# Patient Record
Sex: FEMALE | Race: WHITE | NOT HISPANIC OR LATINO | Employment: OTHER | URBAN - METROPOLITAN AREA
[De-identification: names, ages, dates, MRNs, and addresses within clinical notes are randomized per-mention and may not be internally consistent; named-entity substitution may affect disease eponyms.]

---

## 2017-02-01 ENCOUNTER — APPOINTMENT (OUTPATIENT)
Dept: PLASTIC SURGERY | Facility: IMAGING CENTER | Age: 77
End: 2017-02-01

## 2017-02-01 DIAGNOSIS — M81.0 OSTEOPOROSIS: ICD-10-CM

## 2017-02-01 RX ORDER — ZOLEDRONIC ACID 5 MG/100ML
5 INJECTION, SOLUTION INTRAVENOUS ONCE
Qty: 100 ML | Refills: 0 | Status: SHIPPED
Start: 2017-02-01 | End: 2017-02-01

## 2017-02-06 RX ORDER — LOVASTATIN 10 MG/1
TABLET ORAL
Qty: 90 TAB | Refills: 3 | Status: SHIPPED | OUTPATIENT
Start: 2017-02-06 | End: 2018-01-22 | Stop reason: SDUPTHER

## 2017-02-24 ENCOUNTER — HOSPITAL ENCOUNTER (OUTPATIENT)
Dept: RADIOLOGY | Facility: MEDICAL CENTER | Age: 77
End: 2017-02-24
Attending: FAMILY MEDICINE
Payer: MEDICARE

## 2017-02-24 ENCOUNTER — OFFICE VISIT (OUTPATIENT)
Dept: INTERNAL MEDICINE | Facility: IMAGING CENTER | Age: 77
End: 2017-02-24
Payer: MEDICARE

## 2017-02-24 VITALS
WEIGHT: 182 LBS | SYSTOLIC BLOOD PRESSURE: 130 MMHG | OXYGEN SATURATION: 95 % | RESPIRATION RATE: 14 BRPM | BODY MASS INDEX: 31.07 KG/M2 | TEMPERATURE: 98.4 F | HEART RATE: 58 BPM | HEIGHT: 64 IN | DIASTOLIC BLOOD PRESSURE: 60 MMHG

## 2017-02-24 DIAGNOSIS — R10.31 RIGHT GROIN PAIN: Primary | ICD-10-CM

## 2017-02-24 DIAGNOSIS — M25.551 RIGHT HIP PAIN: ICD-10-CM

## 2017-02-24 DIAGNOSIS — R10.31 RIGHT GROIN PAIN: ICD-10-CM

## 2017-02-24 PROCEDURE — 1101F PT FALLS ASSESS-DOCD LE1/YR: CPT | Performed by: FAMILY MEDICINE

## 2017-02-24 PROCEDURE — 1036F TOBACCO NON-USER: CPT | Performed by: FAMILY MEDICINE

## 2017-02-24 PROCEDURE — G8482 FLU IMMUNIZE ORDER/ADMIN: HCPCS | Performed by: FAMILY MEDICINE

## 2017-02-24 PROCEDURE — G8419 CALC BMI OUT NRM PARAM NOF/U: HCPCS | Performed by: FAMILY MEDICINE

## 2017-02-24 PROCEDURE — 4040F PNEUMOC VAC/ADMIN/RCVD: CPT | Performed by: FAMILY MEDICINE

## 2017-02-24 PROCEDURE — 73521 X-RAY EXAM HIPS BI 2 VIEWS: CPT

## 2017-02-24 PROCEDURE — G8432 DEP SCR NOT DOC, RNG: HCPCS | Performed by: FAMILY MEDICINE

## 2017-02-24 PROCEDURE — 99214 OFFICE O/P EST MOD 30 MIN: CPT | Performed by: FAMILY MEDICINE

## 2017-02-24 NOTE — PROGRESS NOTES
SUBJECTIVE:    Chief Complaint   Patient presents with   • Hip Pain     right   • Groin Pain     right   • Groin Swelling     right       Kacie Tiffanie Rubalcava is a 76 y.o. female,   Established Patient     PROBLEM #1-HISTORY OF PRESENT ILLNESS  New Problem  PATIENT STATEMENT OF PROBLEM - R LE issues  ONSET - 4 weeks  COURSE - never had before. She did have R hip issues that were treated, which resolved, and this current symptoms are different. She now has R anterior groin pain that radiates around to her R hip.  She is R leg and R hand dominant.  She does have some R LE swelling compared to left.  Her back and L knee are fine now.   INTENSITY/STATUS/LOCATION/RADIATION - mild to moderate/ present/ as above  AGGRAVATORS - certain movements  RELIEVERS - rest  TREATMENTS/COMPLIANCE/@GOAL? - PT for R LE was ineffective; ibuprofen ineffective/ good/ no     Allergies   Allergen Reactions   • Food Anaphylaxis     mussels   • Sulfa Drugs Anaphylaxis   • Ace Inhibitors Cough   • Nifedipine Swelling     Leg edema       Patient Active Problem List    Diagnosis Date Noted   • Essential hypertension 12/01/2015   • Obesity (BMI 30.0-34.9) 04/09/2015   • Mixed hyperlipidemia with apolipoprotein E2 variant 04/09/2015   • Insulin resistance 04/09/2015   • Metabolic syndrome 04/09/2015   • Inflammation of arteries (CMS-HCC) 04/09/2015   • Elevated homocysteine (CMS-HCC) 04/09/2015   • Sinus bradycardia 03/16/2015   • Postmenopausal bleeding 03/13/2015   • GI problem 02/27/2015   • PMB (postmenopausal bleeding) 02/27/2015   • Abnormal pelvic ultrasound 02/27/2015   • Eczema 10/14/2014   • Abnormal biliary HIDA scan 10/14/2014   • Osteoporosis, post-menopausal 10/14/2014   • Need for influenza vaccination 10/07/2014   • Environmental allergies 04/18/2014   • Eustachian tube dysfunction 04/18/2014   • Breast cancer screening 11/13/2013   • Dry skin dermatitis 11/13/2013   • GI symptoms 11/13/2013   • Vitamin D deficiency disease  "11/13/2013   • Dupuytren's contracture of both hands 05/22/2013   • Rash 05/22/2013   • Degenerative joint disease    • Osteoporosis    • Hyperlipemia        Outpatient Encounter Prescriptions as of 2/24/2017   Medication Sig Dispense Refill   • lovastatin (MEVACOR) 10 MG tablet TAKE 1 TAB BY MOUTH EVERY DAY. 90 Tab 3   • atenolol (TENORMIN) 25 MG Tab TAKE 1 TAB BY MOUTH EVERY DAY. 90 Tab 3   • losartan (COZAAR) 50 MG Tab Take 1 Tab by mouth every day. 90 Tab 3   • aspirin EC (ECOTRIN) 81 MG TBEC Take 1 Tab by mouth every day.     • Cholecalciferol (VITAMIN D3) 5000 UNITS CAPS Take 1 Cap by mouth every day.     • Glucosamine-Chondroit-Vit C-Mn (GLUCOSAMINE CHONDR 500 COMPLEX PO) Take 1,000 mg by mouth.     • Melatonin 5 MG TABS Take 1-2 Tabs by mouth at bedtime as needed.     • Coral Calcium 1000 (390 CA) MG TABS Take 1 Each by mouth every day. 60 Each    • Ascorbic Acid (VITAMIN C) 1000 MG TABS Take 1 Tab by mouth. 60 Each      No facility-administered encounter medications on file as of 2/24/2017.       Social History   Substance Use Topics   • Smoking status: Never Smoker    • Smokeless tobacco: Never Used   • Alcohol Use: 3.6 oz/week     6 Standard drinks or equivalent per week      Comment: 3-4 glasses per week       Family History   Problem Relation Age of Onset   • Lung Disease Mother    • Cancer Mother      lung cancer   • Cancer Paternal Aunt      breast       Patient's Past, Social, and Family History reviewed and updated by me in EPIC today.    REVIEW OF SYMPTOMS:               Pertinent Positives as above.    All other systems reviewed and negative.     OBJECTIVE:    /60 mmHg  Pulse 58  Temp(Src) 36.9 °C (98.4 °F)  Resp 14  Ht 1.626 m (5' 4.02\")  Wt 82.555 kg (182 lb)  BMI 31.22 kg/m2  SpO2 95%  LMP 01/01/2002  Body mass index is 31.22 kg/(m^2).    Well developed, well nourished female, no acute distress, non-ill appearing. Comfortable, appears stated age, pleasant and cooperative  HEAD: " atraumatic, normocephalic   EYES: Conjunctiva normal, EOMI, PERRLA, acuity grossly intact.   EARS/NOSE/THROAT: TM's normal, no SSX of infection, no perforation, no hemotympanum, acuity grossly intact. Oropharynx: benign, no lesions noted. Nares: benign.   NECK: supple, no adenopathy, no thyromegaly or nodules, no JVD, no carotid bruits.   CHEST/LUNGS: clear to auscultation and percussion bilaterally. No adventitious breath sounds.   CARDIOVASCULAR: regular rate and rhythm, no murmur. PMI not displaced. Good central and peripheral pulses.   BACK: no CVA tenderness.   ABDOMEN: soft, non-tender, non-distended, no masses, no hepatosplenomegaly. Normal active bowel tones.   : deferred.   Rectal: deferred.   Extremities: warm/well-perfused, no cyanosis, clubbing. Mild+ bilateral lower leg not pitting edema: chronic.  R hip/groin pain to manipulation/ROM.    SKIN: clear, unbroken, no rashes, normal turgor.   Neuro: Mental Status: Alert and Oriented x 3. CN II-XII grossly intact. Gait normal. Non-focal, intact. Normal strength, sensation    ASSESSMENT:    1. Right groin pain  DX-HIP-BILATERAL-WITH PELVIS-2 VIEWS   2. Right hip pain  DX-HIP-BILATERAL-WITH PELVIS-2 VIEWS       PLAN:    Total Face-to-Face time spent with patient: 30 minutes  Amount of time spent counseling patient and/or coordinating care: 20 minutes    The nature of patient counseling as below:  -Patient Education, including below topics:  -Differential Diagnoses and treatment options discussed  -Risks, benefits, alternatives discussed  -Therapeutic Lifestyle Changes discussed    The nature of coordination of care as below:  -Continue (other) present chronic medications  -PHYSICAL THERAPY evaluation and treatment (Order given)  -Other: Dx Hips  -Seek medical attention immediately if worse    FOLLOW-UP:  -in 1 month  -and sooner if test/consult results warrant  And for Health Care Maintenance Exams  And as needed.

## 2017-02-24 NOTE — MR AVS SNAPSHOT
"        Kacie Rubalcava   2017 2:00 PM   Office Visit   MRN: [id number]    Department:  [facility]   Dept Phone:  [phone]    Description:  Female : 1940   Provider:  Nino Lundberg M.D.           Reason for Visit     Hip Pain right    Groin Pain right    Groin Swelling right      Allergies as of 2017     Allergen Noted Reactions    Food 10/09/2011   Anaphylaxis    mussels    Sulfa Drugs 2008   Anaphylaxis    Ace Inhibitors 2015   Cough    Nifedipine 2015   Swelling    Leg edema      You were diagnosed with     Right groin pain   [918974]  -  Primary     Right hip pain   [[id number]]         Vital Signs     Blood Pressure Pulse Temperature Respirations Height Weight    130/60 mmHg 58 36.9 °C (98.4 °F) 14 1.626 m (5' 4.02\") 82.555 kg (182 lb)    Body Mass Index Oxygen Saturation Last Menstrual Period Smoking Status          31.22 kg/m2 95% 2002 Never Smoker         Basic Information     Date Of Birth Sex Race Ethnicity Preferred Language    1940 Female White Non- English      Your appointments     Mar 12, 2017  1:30 PM   EST MISC Infusion 2 HR with RN 2   Infusion Services ([facility])    [address] [place] [address] [address]  [place] [address] [phone]              Problem List              ICD-10-CM Priority Class Noted - Resolved    Degenerative joint disease M19.90   Unknown - Present    Osteoporosis M81.0   Unknown - Present    Hyperlipemia E78.5   Unknown - Present    Dupuytren's contracture of both hands M72.0   2013 - Present    Rash R21   2013 - Present    Breast cancer screening Z12.39   2013 - Present    Dry skin dermatitis L85.3   2013 - Present    GI symptoms R19.8   2013 - Present    Vitamin D deficiency disease E55.9   2013 - Present    Environmental allergies Z91.09   2014 - Present    Eustachian tube dysfunction H69.80   2014 - Present    Need for influenza vaccination Z23   10/7/2014 - Present   " Eczema L30.9   10/14/2014 - Present    Abnormal biliary HIDA scan    10/14/2014 - Present    Osteoporosis, post-menopausal M81.0   10/14/2014 - Present    GI problem R19.8   2/27/2015 - Present    PMB (postmenopausal bleeding) N95.0   2/27/2015 - Present    Abnormal pelvic ultrasound    2/27/2015 - Present    Postmenopausal bleeding N95.0   3/13/2015 - Present    Sinus bradycardia R00.1   3/16/2015 - Present    Obesity (BMI 30.0-34.9) E66.9   4/9/2015 - Present    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   4/9/2015 - Present    Insulin resistance E88.81   4/9/2015 - Present    Metabolic syndrome E88.81   4/9/2015 - Present    Inflammation of arteries (CMS-HCC) M30.0   4/9/2015 - Present    Elevated homocysteine (CMS-HCC) E72.11   4/9/2015 - Present    Essential hypertension I10   12/1/2015 - Present      Health Maintenance        Date Due Completion Dates    MAMMOGRAM 3/2/2018 3/2/2016, 1/29/2015, 12/17/2013, 12/12/2013, 10/19/2012, 9/16/2011, 2/26/2010, 2/18/2010, 2/18/2010, 10/7/2008, 9/30/2008, 9/30/2008    BONE DENSITY 10/22/2019 10/22/2014, 10/19/2012, 2/18/2010, 9/30/2008    IMM DTaP/Tdap/Td Vaccine (2 - Td) 5/22/2023 5/22/2013            Current Immunizations     13-VALENT PCV PREVNAR 12/9/2014    Influenza TIV (IM) 10/31/2012 12:05 PM    Influenza Vaccine Adult HD 10/13/2016, 10/16/2015, 10/7/2014  1:04 PM    Pneumococcal polysaccharide vaccine (PPSV-23) 11/13/2010    SHINGLES VACCINE 11/13/2007    Tdap Vaccine 5/22/2013      Below and/or attached are the medications your provider expects you to take. Review all of your home medications and newly ordered medications with your provider and/or pharmacist. Follow medication instructions as directed by your provider and/or pharmacist. Please keep your medication list with you and share with your provider. Update the information when medications are discontinued, doses are changed, or new medications (including over-the-counter products) are  added; and carry medication information at all times in the event of emergency situations     Allergies:  FOOD - Anaphylaxis     SULFA DRUGS - Anaphylaxis     ACE INHIBITORS - Cough     NIFEDIPINE - Swelling               Medications  Valid as of: February 24, 2017 -  4:52 PM    Generic Name Brand Name Tablet Size Instructions for use    Ascorbic Acid (Tab) Vitamin C 1000 MG Take 1 Tab by mouth.        Aspirin (Tablet Delayed Response) ECOTRIN 81 MG Take 1 Tab by mouth every day.        Atenolol (Tab) TENORMIN 25 MG TAKE 1 TAB BY MOUTH EVERY DAY.        Cholecalciferol (Cap) vitamin D3 5000 UNITS Take 1 Cap by mouth every day.        Coral Calcium (Tab) Coral Calcium 1000 (390 CA) MG Take 1 Each by mouth every day.        Glucosamine-Chondroit-Vit C-Mn   Take 1,000 mg by mouth.        Losartan Potassium (Tab) COZAAR 50 MG Take 1 Tab by mouth every day.        Lovastatin (Tab) MEVACOR 10 MG TAKE 1 TAB BY MOUTH EVERY DAY.        Melatonin (Tab) Melatonin 5 MG Take 1-2 Tabs by mouth at bedtime as needed.        .                 Medicines prescribed today were sent to:     [facility]ACY #[id number] - [place] - [address] [place] [address]    Phone: [phone] Fax: [phone]    Open 24 Hours?: No      Medication refill instructions:       If your prescription bottle indicates you have medication refills left, it is not necessary to call your provider’s office. Please contact your pharmacy and they will refill your medication.    If your prescription bottle indicates you do not have any refills left, you may request refills at any time through one of the following ways: The online [employer] system (except Urgent Care), by calling your provider’s office, or by asking your pharmacy to contact your provider’s office with a refill request. Medication refills are processed only during regular business hours and may not be available until the next business day. Your provider may request additional information or to  have a follow-up visit with you prior to refilling your medication.   *Please Note: Medication refills are assigned a new Rx number when refilled electronically. Your pharmacy may indicate that no refills were authorized even though a new prescription for the same medication is available at the pharmacy. Please request the medicine by name with the pharmacy before contacting your provider for a refill.        Your To Do List     Future Labs/Procedures Complete By Expires    DX-HIP-BILATERAL-WITH PELVIS-2 VIEWS  As directed 2/24/2018      Other Notes About Your Plan     Colonoscopy: 9/7/10 repeat in 10 yrs  DEXA 10/14 Mammo 1/29/14 A1c 11/31/12  6.2  Neurosurg-Song- Phys Med & Rehab-[facility]    Communication Authorzation Form 1/27/15 scanned in chart           MyChart Access Code: Activation code not generated  Current MyChart Status: Active

## 2017-02-26 NOTE — PATIENT INSTRUCTIONS
Current Outpatient Prescriptions Ordered in [facility]   Medication Sig Dispense Refill   • lovastatin (MEVACOR) 10 MG tablet TAKE 1 TAB BY MOUTH EVERY DAY. 90 Tab 3   • atenolol (TENORMIN) 25 MG Tab TAKE 1 TAB BY MOUTH EVERY DAY. 90 Tab 3   • losartan (COZAAR) 50 MG Tab Take 1 Tab by mouth every day. 90 Tab 3   • aspirin EC (ECOTRIN) 81 MG TBEC Take 1 Tab by mouth every day.     • Cholecalciferol (VITAMIN D3) 5000 UNITS CAPS Take 1 Cap by mouth every day.     • Glucosamine-Chondroit-Vit C-Mn (GLUCOSAMINE CHONDR 500 COMPLEX PO) Take 1,000 mg by mouth.     • Melatonin 5 MG TABS Take 1-2 Tabs by mouth at bedtime as needed.     • Coral Calcium 1000 (390 CA) MG TABS Take 1 Each by mouth every day. 60 Each    • Ascorbic Acid (VITAMIN C) 1000 MG TABS Take 1 Tab by mouth. 60 Each      No current Epic-ordered facility-administered medications on file.

## 2017-04-05 RX ORDER — LOSARTAN POTASSIUM 50 MG/1
TABLET ORAL
Qty: 90 TAB | Refills: 3 | Status: SHIPPED | OUTPATIENT
Start: 2017-04-05 | End: 2018-01-05

## 2017-05-31 ENCOUNTER — OFFICE VISIT (OUTPATIENT)
Dept: OTHER | Facility: IMAGING CENTER | Age: 77
End: 2017-05-31

## 2017-05-31 DIAGNOSIS — M54.2 NECK PAIN ON LEFT SIDE: ICD-10-CM

## 2017-05-31 PROCEDURE — 99213 OFFICE O/P EST LOW 20 MIN: CPT | Mod: 25 | Performed by: FAMILY MEDICINE

## 2017-05-31 PROCEDURE — 97811 ACUP 1/> W/O ESTIM EA ADD 15: CPT | Performed by: FAMILY MEDICINE

## 2017-05-31 PROCEDURE — 97813 ACUP 1/> W/ESTIM 1ST 15 MIN: CPT | Performed by: FAMILY MEDICINE

## 2017-05-31 NOTE — MR AVS SNAPSHOT
Kacie Rubalcava   2017 10:00 AM   Office Visit   MRN: [id number]    Department:  Acupuncture Med   Dept Phone:  [phone]    Description:  Female : 1940   Provider:  Freeman Zhang D.O.           Allergies as of 2017     Allergen Noted Reactions    Food 10/09/2011   Anaphylaxis    mussels    Sulfa Drugs 2008   Anaphylaxis    Ace Inhibitors 2015   Cough    Nifedipine 2015   Swelling    Leg edema      You were diagnosed with     Neck pain on left side   [[id number]]         Vital Signs     Last Menstrual Period Smoking Status                2002 Never Smoker           Basic Information     Date Of Birth Sex Race Ethnicity Preferred Language    1940 Female White Non- English      Your appointments     2017  1:30 PM   EST MISC Infusion 2 HR with RN 5   Infusion Services ([facility])    [address] [facility] [address]  [place] [id number] [phone]              Problem List              ICD-10-CM Priority Class Noted - Resolved    Degenerative joint disease M19.90   Unknown - Present    Osteoporosis M81.0   Unknown - Present    Hyperlipemia E78.5   Unknown - Present    Dupuytren's contracture of both hands M72.0   2013 - Present    Rash R21   2013 - Present    Breast cancer screening Z12.39   2013 - Present    Dry skin dermatitis L85.3   2013 - Present    GI symptoms R19.8   2013 - Present    Vitamin D deficiency disease E55.9   2013 - Present    Environmental allergies Z91.09   2014 - Present    Eustachian tube dysfunction H69.80   2014 - Present    Need for influenza vaccination Z23   10/7/2014 - Present    Eczema L30.9   10/14/2014 - Present    Abnormal biliary HIDA scan    10/14/2014 - Present    Osteoporosis, post-menopausal M81.0   10/14/2014 - Present    GI problem R19.8   2015 - Present    PMB (postmenopausal bleeding) N95.0   2015 - Present    Abnormal pelvic ultrasound    2015 - Present    Postmenopausal bleeding N95.0   3/13/2015 - Present    Sinus bradycardia R00.1   3/16/2015 - Present    Obesity (BMI 30.0-34.9) E66.9   4/9/2015 - Present    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   4/9/2015 - Present    Insulin resistance E88.81   4/9/2015 - Present    Metabolic syndrome E88.81   4/9/2015 - Present    Inflammation of arteries (CMS-HCC) M30.0   4/9/2015 - Present    Elevated homocysteine (CMS-HCC) E72.11   4/9/2015 - Present    Essential hypertension I10   12/1/2015 - Present    Neck pain on left side M54.2   5/31/2017 - Present      Health Maintenance        Date Due Completion Dates    MAMMOGRAM 3/2/2018 3/2/2016, 1/29/2015, 12/17/2013, 12/12/2013, 10/19/2012, 9/16/2011, 2/26/2010, 2/18/2010, 2/18/2010, 10/7/2008, 9/30/2008, 9/30/2008    BONE DENSITY 10/22/2019 10/22/2014, 10/19/2012, 2/18/2010, 9/30/2008    IMM DTaP/Tdap/Td Vaccine (2 - Td) 5/22/2023 5/22/2013            Current Immunizations     13-VALENT PCV PREVNAR 12/9/2014    Influenza TIV (IM) 10/31/2012 12:05 PM    Influenza Vaccine Adult HD 10/13/2016, 10/16/2015, 10/7/2014  1:04 PM    Pneumococcal polysaccharide vaccine (PPSV-23) 11/13/2010    SHINGLES VACCINE 11/13/2007    Tdap Vaccine 5/22/2013      Below and/or attached are the medications your provider expects you to take. Review all of your home medications and newly ordered medications with your provider and/or pharmacist. Follow medication instructions as directed by your provider and/or pharmacist. Please keep your medication list with you and share with your provider. Update the information when medications are discontinued, doses are changed, or new medications (including over-the-counter products) are added; and carry medication information at all times in the event of emergency situations     Allergies:  FOOD - Anaphylaxis     SULFA DRUGS - Anaphylaxis     ACE INHIBITORS - Cough     NIFEDIPINE - Swelling               Medications  Valid as of: May 31, 2017  - 11:11 AM    Generic Name Brand Name Tablet Size Instructions for use    Ascorbic Acid (Tab) Vitamin C 1000 MG Take 1 Tab by mouth.        Aspirin (Tablet Delayed Response) ECOTRIN 81 MG Take 1 Tab by mouth every day.        Atenolol (Tab) TENORMIN 25 MG TAKE 1 TAB BY MOUTH EVERY DAY.        Cholecalciferol (Cap) vitamin D3 5000 UNITS Take 1 Cap by mouth every day.        Coral Calcium (Tab) Coral Calcium 1000 (390 CA) MG Take 1 Each by mouth every day.        Glucosamine-Chondroit-Vit C-Mn   Take 1,000 mg by mouth.        Losartan Potassium (Tab) COZAAR 50 MG TAKE 1 TAB BY MOUTH EVERY DAY.        Lovastatin (Tab) MEVACOR 10 MG TAKE 1 TAB BY MOUTH EVERY DAY.        Melatonin (Tab) Melatonin 5 MG Take 1-2 Tabs by mouth at bedtime as needed.        .                 Medicines prescribed today were sent to:     [facility]/PHARMACY #[id number] - [place] - [address] [place] [address]    Phone: [phone] Fax: [phone]    Open 24 Hours?: No      Medication refill instructions:       If your prescription bottle indicates you have medication refills left, it is not necessary to call your provider’s office. Please contact your pharmacy and they will refill your medication.    If your prescription bottle indicates you do not have any refills left, you may request refills at any time through one of the following ways: The online [employer] system (except Urgent Care), by calling your provider’s office, or by asking your pharmacy to contact your provider’s office with a refill request. Medication refills are processed only during regular business hours and may not be available until the next business day. Your provider may request additional information or to have a follow-up visit with you prior to refilling your medication.   *Please Note: Medication refills are assigned a new Rx number when refilled electronically. Your pharmacy may indicate that no refills were authorized even though a new prescription for the same  medication is available at the pharmacy. Please request the medicine by name with the pharmacy before contacting your provider for a refill.        Instructions    Have encouraged the patient to rest after the acupuncture session today - taking naps or going to sleep early as necessary.  Increase intake of water and refrain from strenuous activities.  Patient may expect to feel transient worsening of symptoms, but this should resolve to benefit in the next day or two after treatment.    The side effects of Acupuncture needle insertion include: minor bruising, bleeding, or pain at the site of needle insertion.  If more worrisome symptoms, such as continued bleeding, severe bruising, or continue pain or altered sensation persist, please contact [facility] office @ [phone]        Other Notes About Your Plan     Colonoscopy: 9/7/10 repeat in 10 yrs  DEXA 10/14 Mammo 1/29/14 A1c 11/31/12  6.2  Neurosurg-Song- Phys Med [facility] Rehab-[facility]    Communication Authorzation Form 1/27/15 scanned in chart           [email or web address] Access Code: Activation code not generated  Current [email or web address] Status: Active

## 2017-05-31 NOTE — PROGRESS NOTES
[facility] Acupuncture Progress Note  [address] [place] [address]  Dept: [phone]      Patient Name: Kacie Rubalcava   MRN: [id number]  YOB: 1940  PCP: Ally Barroso M.D.  Date of Service: 5/31/2017 10:05 AM    CC Back of shoulder pain   HPI Patient is a 75 yo  female with 1 year hx of left sided back of shoulder tenderness that occasionally radiates to outside of outer arm.  She has been Dr. Whyte's group patient for a long time and it helps but this episode is bothering her and she can't get an appointment with Dr. Whyte soon enough.    She denies numbness and weakness of arm and hand and there isn't any trauma preceding the event.  She also has her chronic problems of every joints in her body hurts.     ROS Birthplace: Not asked  Color:  Season:  -handed  Scars:   PMH Past Medical History   Diagnosis Date   • Hypertension    • HTN (hypertension), benign    • Hyperlipemia    • Osteoporosis    • Degenerative joint disease    • Anxiety    • Snoring    • Arthritis 03/12/15     generalized   • Other specified symptom associated with female genital organs      post menopausal bleeding   • High cholesterol    • Neck pain on left side 5/31/2017     Past Surgical History   Procedure Laterality Date   • Tonsillectomy     • Us-needle core bx-breast panel     • Breast biopsy  11/13/08     Performed by ISRAEL HICKS at SURGERY SAME DAY [facility] ORS X3   • Appendectomy     • Colon resection  2000     polyps   • Hysteroscopy with video diagnostic  3/13/2015     Performed by Alexandria Keenan M.D. at SURGERY SAME DAY [facility] ORS   • Dilation and curettage  3/13/2015     Performed by Alexandria Keenan M.D. at SURGERY SAME DAY [facility] ORS       Social History     Social History   • Marital Status: Single     Spouse Name: N/A   • Number of Children: N/A   • Years of Education: N/A     Social History Main Topics   • Smoking status: Never Smoker    • Smokeless tobacco:  Never Used   • Alcohol Use: 3.6 oz/week     6 Standard drinks or equivalent per week      Comment: 3-4 glasses per week   • Drug Use: No   • Sexual Activity: Not on file     Other Topics Concern   • Not on file     Social History Narrative      MEDS Current Outpatient Prescriptions on File Prior to Visit   Medication Sig Dispense Refill   • losartan (COZAAR) 50 MG Tab TAKE 1 TAB BY MOUTH EVERY DAY. 90 Tab 3   • lovastatin (MEVACOR) 10 MG tablet TAKE 1 TAB BY MOUTH EVERY DAY. 90 Tab 3   • atenolol (TENORMIN) 25 MG Tab TAKE 1 TAB BY MOUTH EVERY DAY. 90 Tab 3   • aspirin EC (ECOTRIN) 81 MG TBEC Take 1 Tab by mouth every day.     • Cholecalciferol (VITAMIN D3) 5000 UNITS CAPS Take 1 Cap by mouth every day.     • Glucosamine-Chondroit-Vit C-Mn (GLUCOSAMINE CHONDR 500 COMPLEX PO) Take 1,000 mg by mouth.     • Melatonin 5 MG TABS Take 1-2 Tabs by mouth at bedtime as needed.     • Coral Calcium 1000 (390 CA) MG TABS Take 1 Each by mouth every day. 60 Each    • Ascorbic Acid (VITAMIN C) 1000 MG TABS Take 1 Tab by mouth. 60 Each      No current facility-administered medications on file prior to visit.      ALLERGIES Allergies   Allergen Reactions   • Food Anaphylaxis     mussels   • Sulfa Drugs Anaphylaxis   • Ace Inhibitors Cough   • Nifedipine Swelling     Leg edema      PE Gail Exam: Stomach Qi: (+) pecking radial pulse  (+) Oketsu, (+) Immune,   (R) Adrenal - B/LKid16 St9 [employer] ASIS Kid2         Assessment Eastern Liver/blood stagnation, Stomach qi imbalance, Immune imbalance, Adrenal exhaustion.    Western Encounter Diagnoses   Name Primary?   • Neck pain on left side                   Plan Set 1: Left (Lv4, Lu5)  Set 2: B/L LI10-11 area, B/L (Kd10, Lv8, Tw9)  Set 3: R (TW 10 ++> 12, ST 39 --> ST 36, GB 39 ++> GB 34, BL 40), L (PC 5 --> 9, SP 8, LR 4.5 --> LR 8, KD 7 --> 10)  Set 4: Luis Felipe ramon, Herve ramon, B/L Ear allergy point with ASP, Er aaron robert, B/L Er pung 1 robert     More than 16 minutes of this 30  minute interview were spent in discussing the benefits and utility of acupuncture.  I answered patient questions about efficacy, safety, and what to expect during a treatment, and the likely number of treatments needed.  Advised to increase neck stretching exercise twice daily with smooth motion and avoid end-point hard stop.  Total acupuncture treatment time = 45 minutes.  Patient will schedule another appointment to return for treatment.    Freeman Zhang D.O.

## 2017-05-31 NOTE — PATIENT INSTRUCTIONS
Have encouraged the patient to rest after the acupuncture session today - taking naps or going to sleep early as necessary.  Increase intake of water and refrain from strenuous activities.  Patient may expect to feel transient worsening of symptoms, but this should resolve to benefit in the next day or two after treatment.    The side effects of Acupuncture needle insertion include: minor bruising, bleeding, or pain at the site of needle insertion.  If more worrisome symptoms, such as continued bleeding, severe bruising, or continue pain or altered sensation persist, please contact [facility] office @ [phone]

## 2017-06-08 ENCOUNTER — OFFICE VISIT (OUTPATIENT)
Dept: OTHER | Facility: IMAGING CENTER | Age: 77
End: 2017-06-08

## 2017-06-08 DIAGNOSIS — M81.0 OSTEOPOROSIS, POST-MENOPAUSAL: ICD-10-CM

## 2017-06-08 DIAGNOSIS — M54.2 NECK PAIN ON LEFT SIDE: ICD-10-CM

## 2017-06-08 PROCEDURE — 99213 OFFICE O/P EST LOW 20 MIN: CPT | Mod: 25 | Performed by: FAMILY MEDICINE

## 2017-06-08 PROCEDURE — 97811 ACUP 1/> W/O ESTIM EA ADD 15: CPT | Performed by: FAMILY MEDICINE

## 2017-06-08 PROCEDURE — 97813 ACUP 1/> W/ESTIM 1ST 15 MIN: CPT | Performed by: FAMILY MEDICINE

## 2017-06-08 RX ORDER — ZOLEDRONIC ACID 5 MG/100ML
5 INJECTION, SOLUTION INTRAVENOUS ONCE
Qty: 100 ML | Refills: 0 | Status: SHIPPED | OUTPATIENT
Start: 2017-06-08 | End: 2017-06-08 | Stop reason: SDUPTHER

## 2017-06-08 RX ORDER — ZOLEDRONIC ACID 5 MG/100ML
5 INJECTION, SOLUTION INTRAVENOUS ONCE
Qty: 100 ML | Refills: 0 | Status: SHIPPED
Start: 2017-06-08 | End: 2017-06-08

## 2017-06-08 NOTE — PATIENT INSTRUCTIONS
Have encouraged the patient to rest after the acupuncture session today - taking naps or going to sleep early as necessary.  Increase intake of water and refrain from strenuous activities.  Patient may expect to feel transient worsening of symptoms, but this should resolve to benefit in the next day or two after treatment.    The side effects of Acupuncture needle insertion include: minor bruising, bleeding, or pain at the site of needle insertion.  If more worrisome symptoms, such as continued bleeding, severe bruising, or continue pain or altered sensation persist, please contact [facility] office @ [phone]

## 2017-06-08 NOTE — PROGRESS NOTES
[facility] Acupuncture Progress Note  [address] [place] [address]  Dept: [phone]      Patient Name: Kacie Rubalcava   MRN: [id number]  YOB: 1940  PCP: Ally Barroso M.D.  Date of Service: 6/8/2017 12:50 PM    CC Back of shoulder pain   HPI Patient is a 77 yo  female with 1 year hx of left sided back of shoulder tenderness that occasionally radiates to outside of outer arm.  She has been Dr. Whyte's group patient for a long time and it helps but this episode is bothering her and she can't get an appointment with Dr. Whyte soon enough.    She denies numbness and weakness of arm and hand and there isn't any trauma preceding the event.  She also has her chronic problems of every joints in her body hurts.    Since last tx, she felt a lot less pain and in the area of pain returning after 5 days it seems to be much limited to the posterior aspect.   ROS Birthplace: Not asked  Color:  Season:  -handed  Scars:   PMH Past Medical History   Diagnosis Date   • Hypertension    • HTN (hypertension), benign    • Hyperlipemia    • Osteoporosis    • Degenerative joint disease    • Anxiety    • Snoring    • Arthritis 03/12/15     generalized   • Other specified symptom associated with female genital organs      post menopausal bleeding   • High cholesterol    • Neck pain on left side 5/31/2017     Past Surgical History   Procedure Laterality Date   • Tonsillectomy     • Us-needle core bx-breast panel     • Breast biopsy  11/13/08     Performed by ISRAEL HICKS at SURGERY SAME DAY [facility] ORS X3   • Appendectomy     • Colon resection  2000     polyps   • Hysteroscopy with video diagnostic  3/13/2015     Performed by Alexandria Keenan M.D. at SURGERY SAME DAY [facility]   • Dilation and curettage  3/13/2015     Performed by Alexandria Keenan M.D. at SURGERY SAME DAY [facility] ORS       Social History     Social History   • Marital Status: Single     Spouse Name: N/A   •  Number of Children: N/A   • Years of Education: N/A     Social History Main Topics   • Smoking status: Never Smoker    • Smokeless tobacco: Never Used   • Alcohol Use: 3.6 oz/week     6 Standard drinks or equivalent per week      Comment: 3-4 glasses per week   • Drug Use: No   • Sexual Activity: Not on file     Other Topics Concern   • Not on file     Social History Narrative      MEDS Current Outpatient Prescriptions on File Prior to Visit   Medication Sig Dispense Refill   • losartan (COZAAR) 50 MG Tab TAKE 1 TAB BY MOUTH EVERY DAY. 90 Tab 3   • lovastatin (MEVACOR) 10 MG tablet TAKE 1 TAB BY MOUTH EVERY DAY. 90 Tab 3   • atenolol (TENORMIN) 25 MG Tab TAKE 1 TAB BY MOUTH EVERY DAY. 90 Tab 3   • aspirin EC (ECOTRIN) 81 MG TBEC Take 1 Tab by mouth every day.     • Cholecalciferol (VITAMIN D3) 5000 UNITS CAPS Take 1 Cap by mouth every day.     • Glucosamine-Chondroit-Vit C-Mn (GLUCOSAMINE CHONDR 500 COMPLEX PO) Take 1,000 mg by mouth.     • Melatonin 5 MG TABS Take 1-2 Tabs by mouth at bedtime as needed.     • Coral Calcium 1000 (390 CA) MG TABS Take 1 Each by mouth every day. 60 Each    • Ascorbic Acid (VITAMIN C) 1000 MG TABS Take 1 Tab by mouth. 60 Each      No current facility-administered medications on file prior to visit.      ALLERGIES Allergies   Allergen Reactions   • Food Anaphylaxis     mussels   • Sulfa Drugs Anaphylaxis   • Ace Inhibitors Cough   • Nifedipine Swelling     Leg edema      PE Gail Exam: Stomach Qi: (+) pecking radial pulse  (+) Oketsu, (+) Immune,   (R) Adrenal - B/LKid16 St9 [facility] ASIS Kid2         Assessment Eastern Liver/blood stagnation, Stomach qi imbalance, Immune imbalance, Adrenal exhaustion.    Western Encounter Diagnoses   Name Primary?   • Neck pain on left side                   Plan Set 1: Left (Lv4, Lu5)  Set 2: B/L LI10-11 area, B/L (Kd10, Lv8, Tw9)  Set 3: L (TW 10 --> 12, ST 39 --> ST 36, GB 39 --> GB 34, BL 40), R (PC 5 --> 9, SP 8, LR 4.5 ++> LR 8, KD 7 ++> 10)  Set 4:  Herve Pena, B/L Ear allergy point with ASP, Er galen jones, B/L Er pung 1 robert     Total face to face time was 20 minutes with more than 15 minutes were spent discussing with the patient about her condition which did not include procedure time. >50% of the face to face time was spent in counseling and coordination. Topics discussed included:   Continue neck stretching exercise twice daily with smooth motion and avoid end-point hard stop.  Less refined carbohydrate intake, avoid staying up pass 9 PM.  Total acupuncture treatment time = 45 minutes      Freeman Zhang D.O.

## 2017-06-08 NOTE — MR AVS SNAPSHOT
Kacie Rubalcava   2017 1:00 PM   Office Visit   MRN: [id number]    Department:  Acupuncture Med   Dept Phone:  [phone]    Description:  Female : 1940   Provider:  Freeman Zhang D.O.           Allergies as of 2017     Allergen Noted Reactions    Food 10/09/2011   Anaphylaxis    mussels    Sulfa Drugs 2008   Anaphylaxis    Ace Inhibitors 2015   Cough    Nifedipine 2015   Swelling    Leg edema      You were diagnosed with     Neck pain on left side   [702805]         Vital Signs     Last Menstrual Period Smoking Status                2002 Never Smoker           Basic Information     Date Of Birth Sex Race Ethnicity Preferred Language    1940 Female White Non- English      Your appointments     2017  1:30 PM   EST MISC Infusion 2 HR with RN 5   Infusion Services ([facility])    [address] [facility] [address]  [place] [id number] [phone]              Problem List              ICD-10-CM Priority Class Noted - Resolved    Degenerative joint disease M19.90   Unknown - Present    Osteoporosis M81.0   Unknown - Present    Hyperlipemia E78.5   Unknown - Present    Dupuytren's contracture of both hands M72.0   2013 - Present    Rash R21   2013 - Present    Breast cancer screening Z12.39   2013 - Present    Dry skin dermatitis L85.3   2013 - Present    GI symptoms R19.8   2013 - Present    Vitamin D deficiency disease E55.9   2013 - Present    Environmental allergies Z91.09   2014 - Present    Eustachian tube dysfunction H69.80   2014 - Present    Need for influenza vaccination Z23   10/7/2014 - Present    Eczema L30.9   10/14/2014 - Present    Abnormal biliary HIDA scan    10/14/2014 - Present    Osteoporosis, post-menopausal M81.0   10/14/2014 - Present    GI problem R19.8   2015 - Present    PMB (postmenopausal bleeding) N95.0   2015 - Present    Abnormal pelvic ultrasound    2015 - Present    Postmenopausal bleeding N95.0   3/13/2015 - Present    Sinus bradycardia R00.1   3/16/2015 - Present    Obesity (BMI 30.0-34.9) E66.9   4/9/2015 - Present    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   4/9/2015 - Present    Insulin resistance E88.81   4/9/2015 - Present    Metabolic syndrome E88.81   4/9/2015 - Present    Inflammation of arteries (CMS-HCC) M30.0   4/9/2015 - Present    Elevated homocysteine (CMS-HCC) E72.11   4/9/2015 - Present    Essential hypertension I10   12/1/2015 - Present    Neck pain on left side M54.2   5/31/2017 - Present      Health Maintenance        Date Due Completion Dates    MAMMOGRAM 3/2/2018 3/2/2016, 1/29/2015, 12/17/2013, 12/12/2013, 10/19/2012, 9/16/2011, 2/26/2010, 2/18/2010, 2/18/2010, 10/7/2008, 9/30/2008, 9/30/2008    BONE DENSITY 10/22/2019 10/22/2014, 10/19/2012, 2/18/2010, 9/30/2008    IMM DTaP/Tdap/Td Vaccine (2 - Td) 5/22/2023 5/22/2013            Current Immunizations     13-VALENT PCV PREVNAR 12/9/2014    Influenza TIV (IM) 10/31/2012 12:05 PM    Influenza Vaccine Adult HD 10/13/2016, 10/16/2015, 10/7/2014  1:04 PM    Pneumococcal polysaccharide vaccine (PPSV-23) 11/13/2010    SHINGLES VACCINE 11/13/2007    Tdap Vaccine 5/22/2013      Below and/or attached are the medications your provider expects you to take. Review all of your home medications and newly ordered medications with your provider and/or pharmacist. Follow medication instructions as directed by your provider and/or pharmacist. Please keep your medication list with you and share with your provider. Update the information when medications are discontinued, doses are changed, or new medications (including over-the-counter products) are added; and carry medication information at all times in the event of emergency situations     Allergies:  FOOD - Anaphylaxis     SULFA DRUGS - Anaphylaxis     ACE INHIBITORS - Cough     NIFEDIPINE - Swelling               Medications  Valid as of: June 08, 2017  -  2:00 PM    Generic Name Brand Name Tablet Size Instructions for use    Ascorbic Acid (Tab) Vitamin C 1000 MG Take 1 Tab by mouth.        Aspirin (Tablet Delayed Response) ECOTRIN 81 MG Take 1 Tab by mouth every day.        Atenolol (Tab) TENORMIN 25 MG TAKE 1 TAB BY MOUTH EVERY DAY.        Cholecalciferol (Cap) vitamin D3 5000 UNITS Take 1 Cap by mouth every day.        Coral Calcium (Tab) Coral Calcium 1000 (390 CA) MG Take 1 Each by mouth every day.        Glucosamine-Chondroit-Vit C-Mn   Take 1,000 mg by mouth.        Losartan Potassium (Tab) COZAAR 50 MG TAKE 1 TAB BY MOUTH EVERY DAY.        Lovastatin (Tab) MEVACOR 10 MG TAKE 1 TAB BY MOUTH EVERY DAY.        Melatonin (Tab) Melatonin 5 MG Take 1-2 Tabs by mouth at bedtime as needed.        .                 Medicines prescribed today were sent to:     [facility] #[id number] - [place] - [address] [place] [address]    Phone: [phone] Fax: [phone]    Open 24 Hours?: No      Medication refill instructions:       If your prescription bottle indicates you have medication refills left, it is not necessary to call your provider’s office. Please contact your pharmacy and they will refill your medication.    If your prescription bottle indicates you do not have any refills left, you may request refills at any time through one of the following ways: The online [employer] system (except Urgent Care), by calling your provider’s office, or by asking your pharmacy to contact your provider’s office with a refill request. Medication refills are processed only during regular business hours and may not be available until the next business day. Your provider may request additional information or to have a follow-up visit with you prior to refilling your medication.   *Please Note: Medication refills are assigned a new Rx number when refilled electronically. Your pharmacy may indicate that no refills were authorized even though a new prescription for the same  medication is available at the pharmacy. Please request the medicine by name with the pharmacy before contacting your provider for a refill.        Instructions    Have encouraged the patient to rest after the acupuncture session today - taking naps or going to sleep early as necessary.  Increase intake of water and refrain from strenuous activities.  Patient may expect to feel transient worsening of symptoms, but this should resolve to benefit in the next day or two after treatment.    The side effects of Acupuncture needle insertion include: minor bruising, bleeding, or pain at the site of needle insertion.  If more worrisome symptoms, such as continued bleeding, severe bruising, or continue pain or altered sensation persist, please contact [facility] office @ [phone]        Other Notes About Your Plan     Colonoscopy: 9/7/10 repeat in 10 yrs  DEXA 10/14 Mammo 1/29/14 A1c 11/31/12  6.2  Neurosurg[facility]    Communication Authorzation Form 1/27/15 scanned in chart           [employer] Access Code: Activation code not generated  Current [employer] Status: Active

## 2017-06-09 ENCOUNTER — OUTPATIENT INFUSION SERVICES (OUTPATIENT)
Dept: ONCOLOGY | Facility: MEDICAL CENTER | Age: 77
End: 2017-06-09
Attending: FAMILY MEDICINE
Payer: MEDICARE

## 2017-06-09 VITALS
SYSTOLIC BLOOD PRESSURE: 141 MMHG | WEIGHT: 182.54 LBS | HEIGHT: 64 IN | DIASTOLIC BLOOD PRESSURE: 62 MMHG | OXYGEN SATURATION: 92 % | RESPIRATION RATE: 18 BRPM | BODY MASS INDEX: 31.16 KG/M2 | HEART RATE: 54 BPM | TEMPERATURE: 97.7 F

## 2017-06-09 LAB
CA-I BLD ISE-SCNC: 1.19 MMOL/L (ref 1.1–1.3)
CREAT BLD-MCNC: 1.1 MG/DL (ref 0.5–1.4)

## 2017-06-09 PROCEDURE — 82565 ASSAY OF CREATININE: CPT

## 2017-06-09 PROCEDURE — 36415 COLL VENOUS BLD VENIPUNCTURE: CPT

## 2017-06-09 PROCEDURE — 700111 HCHG RX REV CODE 636 W/ 250 OVERRIDE (IP): Performed by: FAMILY MEDICINE

## 2017-06-09 PROCEDURE — 82330 ASSAY OF CALCIUM: CPT

## 2017-06-09 PROCEDURE — 96365 THER/PROPH/DIAG IV INF INIT: CPT

## 2017-06-09 RX ORDER — ZOLEDRONIC ACID 5 MG/100ML
5 INJECTION, SOLUTION INTRAVENOUS ONCE
Status: COMPLETED | OUTPATIENT
Start: 2017-06-09 | End: 2017-06-09

## 2017-06-09 RX ADMIN — ZOLEDRONIC ACID 5 MG: 0.05 INJECTION, SOLUTION INTRAVENOUS at 14:22

## 2017-06-09 ASSESSMENT — PAIN SCALES - GENERAL: PAINLEVEL: NO PAIN

## 2017-06-09 NOTE — Clinical Note
Infusion Services   [address] [place] [address]  [place] [address]  Phone: [phone]  Fax: [phone]              Dear Dr. Barroso,    Your patient, Kacie Rubalcava (: 1940), was scheduled at [facility].  Kacie's encounter diagnosis is:  No diagnosis found.  She arrived for her appointment, and  the scheduled treatment was   given. These medications were administered to the patient: We administered zoledronic Acid..  Kacie tolerated treatment.. In addition, the following labs were drawn    Recent Results (from the past 24 hour(s))   ISTAT CREATININE    Collection Time: 17  1:53 PM   Result Value Ref Range    Istat Creatinine 1.1 0.5 - 1.4 mg/dL   ISTAT IONIZED CA    Collection Time: 17  1:53 PM   Result Value Ref Range    Istat Ionized Calcium 1.19 1.10 - 1.30 mmol/L            Her next appointment is did not reschedule; beacause she will call for the next appointment.    For more information, you may review the nurse's progress notes in chart review under the notes section.       Sincerely,  Ama Smallwood R.N.

## 2017-06-09 NOTE — PROGRESS NOTES
Pharmacy Reclast Note  Labs 6/9/17  Ionized calcium = 1.19  Cr = 1.1 est CrCl ~ 56 ml/min  Last Reclast dose = 2/29/16  Astrid Barron, BerniceD

## 2017-06-10 NOTE — PROGRESS NOTES
Pt presented to infusion center for Reclast. Pt denies any recent or planned invasive dental surgery, any recent bone fractures or s/s of infection. PIV started, brisk blood return observed. ISTAT i-ca/creat run, pt Ok for treatment. Reclast infused with no s/s of adverse effect. PIV flushed and removed, gauze and coban dressing placed. Pt prefers to call to make next appt when needed. Left on foot in good spirits.

## 2017-06-27 ENCOUNTER — OFFICE VISIT (OUTPATIENT)
Dept: OTHER | Facility: IMAGING CENTER | Age: 77
End: 2017-06-27

## 2017-06-27 DIAGNOSIS — M15.9 PRIMARY OSTEOARTHRITIS INVOLVING MULTIPLE JOINTS: ICD-10-CM

## 2017-06-27 DIAGNOSIS — M54.2 NECK PAIN ON LEFT SIDE: ICD-10-CM

## 2017-06-27 PROCEDURE — 99213 OFFICE O/P EST LOW 20 MIN: CPT | Mod: 25 | Performed by: FAMILY MEDICINE

## 2017-06-27 PROCEDURE — 97811 ACUP 1/> W/O ESTIM EA ADD 15: CPT | Performed by: FAMILY MEDICINE

## 2017-06-27 PROCEDURE — 97813 ACUP 1/> W/ESTIM 1ST 15 MIN: CPT | Performed by: FAMILY MEDICINE

## 2017-06-27 NOTE — PATIENT INSTRUCTIONS
Have encouraged the patient to rest after the acupuncture session today - taking naps or going to sleep early as necessary.  Increase intake of water and refrain from strenuous activities.  Patient may expect to feel transient worsening of symptoms, but this should resolve to benefit in the next day or two after treatment.    The side effects of Acupuncture needle insertion include: minor bruising, bleeding, or pain at the site of needle insertion.  If more worrisome symptoms, such as continued bleeding, severe bruising, or continue pain or altered sensation persist, please contact [facility] office @ [phone]

## 2017-06-27 NOTE — PROGRESS NOTES
[facility] Acupuncture Progress Note  [address] [place] [address]  Dept: [phone]      Patient Name: Kacie Rubalcava   MRN: [id number]  YOB: 1940  PCP: Ally Barroso M.D.  Date of Service: 6/27/2017  1:44 PM    CC Back of shoulder pain   HPI Patient is a 75 yo  female with 1 year hx of left sided back of shoulder tenderness that occasionally radiates to outside of outer arm.  She has been Dr. Whyte's group patient for a long time and it helps but this episode is bothering her and she can't get an appointment with Dr. Whyte soon enough.    She denies numbness and weakness of arm and hand and there isn't any trauma preceding the event.  She also has her chronic problems of every joints in her body hurts.    Since last tx, she felt a lot less pain without returning of pain to the posterior of shoulder.  There is however one fall accidentally while she is doing laundry and her right slipper got caught in the door upon going out of her garage.  She fell upon her anterior surface and had some bruising of the thenar eminence.     ROS Birthplace: Not asked  Color:  Season:  -handed  Scars:   PMH Past Medical History   Diagnosis Date   • Hypertension    • HTN (hypertension), benign    • Hyperlipemia    • Osteoporosis    • Degenerative joint disease    • Anxiety    • Snoring    • Arthritis 03/12/15     generalized   • Other specified symptom associated with female genital organs      post menopausal bleeding   • High cholesterol    • Neck pain on left side 5/31/2017     Past Surgical History   Procedure Laterality Date   • Tonsillectomy     • Us-needle core bx-breast panel     • Breast biopsy  11/13/08     Performed by ISRAEL HICKS at SURGERY SAME DAY [facility] ORS X3   • Appendectomy     • Colon resection  2000     polyps   • Hysteroscopy with video diagnostic  3/13/2015     Performed by Alexandria Keenan M.D. at SURGERY SAME DAY [facility] OR[facility]   • Dilation and  curettage  3/13/2015     Performed by Alexandria Keenan M.D. at SURGERY SAME DAY [facility] Social History     Social History   • Marital Status: Single     Spouse Name: N/A   • Number of Children: N/A   • Years of Education: N/A     Social History Main Topics   • Smoking status: Never Smoker    • Smokeless tobacco: Never Used   • Alcohol Use: 3.6 oz/week     6 Standard drinks or equivalent per week      Comment: 3-4 glasses per week   • Drug Use: No   • Sexual Activity: Not on file     Other Topics Concern   • Not on file     Social History Narrative      MEDS Current Outpatient Prescriptions on File Prior to Visit   Medication Sig Dispense Refill   • losartan (COZAAR) 50 MG Tab TAKE 1 TAB BY MOUTH EVERY DAY. 90 Tab 3   • lovastatin (MEVACOR) 10 MG tablet TAKE 1 TAB BY MOUTH EVERY DAY. 90 Tab 3   • atenolol (TENORMIN) 25 MG Tab TAKE 1 TAB BY MOUTH EVERY DAY. 90 Tab 3   • aspirin EC (ECOTRIN) 81 MG TBEC Take 1 Tab by mouth every day.     • Cholecalciferol (VITAMIN D3) 5000 UNITS CAPS Take 1 Cap by mouth every day.     • Melatonin 5 MG TABS Take 1-2 Tabs by mouth at bedtime as needed.     • Coral Calcium 1000 (390 CA) MG TABS Take 1 Each by mouth every day. 60 Each    • Ascorbic Acid (VITAMIN C) 1000 MG TABS Take 1 Tab by mouth. 60 Each      No current facility-administered medications on file prior to visit.      ALLERGIES Allergies   Allergen Reactions   • Food Anaphylaxis     mussels   • Sulfa Drugs Anaphylaxis   • Ace Inhibitors Cough   • Nifedipine Swelling     Leg edema      PE Gail Exam: Stomach Qi: (+) pecking radial pulse  (+) Oketsu, (+) Immune,   (R) Adrenal - B/LKid16 St9 DaiMai ASIS Kid2         Assessment Eastern Liver/blood stagnation, Stomach qi imbalance, Immune imbalance, Adrenal exhaustion.    Western Encounter Diagnoses   Name Primary?   • Neck pain on left side    • Primary osteoarthritis involving multiple joints                   Plan Set 1: Left (Lv4, Lu5)  Set 2: B/L LI10-11 area,  B/L (Kd10, Lv8, Tw9)  Set 3: L (TW 10 --> 12, ST 39 --> ST 36, SP 3.2, GB 34, BL 40), R (PC 5 --> 9, SP 6 ++> SP 8, SP 3.2, LR 8, KD 7 ++> 10)  Set 4: Luis Felipe ramon, Herve ramon, B/L Ear allergy point, Er galen jones, B/L Er pung 1 robert     Total face to face time was 20 minutes with more than 15 minutes were spent discussing with the patient about her condition which did not include procedure time. >50% of the face to face time was spent in counseling and coordination. Topics discussed included:   Continue neck stretching exercise twice daily with smooth motion and avoid end-point hard stop.  Continue lowered refined carbohydrate intake, avoid staying up pass 9 PM.  Total acupuncture treatment time = 45 minutes      Freeman Zhang D.O.

## 2017-06-27 NOTE — MR AVS SNAPSHOT
Kacie Rubalcava   2017 1:00 PM   Office Visit   MRN: [id number]    Department:  Acupuncture Med   Dept Phone:  [phone]    Description:  Female : 1940   Provider:  Freeman Zhang D.O.           Allergies as of 2017     Allergen Noted Reactions    Food 10/09/2011   Anaphylaxis    mussels    Sulfa Drugs 2008   Anaphylaxis    Ace Inhibitors 2015   Cough    Nifedipine 2015   Swelling    Leg edema      You were diagnosed with     Neck pain on left side   [305778]       Primary osteoarthritis involving multiple joints   [[id number]]         Vital Signs     Last Menstrual Period Smoking Status                2002 Never Smoker           Basic Information     Date Of Birth Sex Race Ethnicity Preferred Language    1940 Female White Non- English      Problem List              ICD-10-CM Priority Class Noted - Resolved    Degenerative joint disease M19.90   Unknown - Present    Osteoporosis M81.0   Unknown - Present    Hyperlipemia E78.5   Unknown - Present    Dupuytren's contracture of both hands M72.0   2013 - Present    Rash R21   2013 - Present    Breast cancer screening Z12.39   2013 - Present    Dry skin dermatitis L85.3   2013 - Present    GI symptoms R19.8   2013 - Present    Vitamin D deficiency disease E55.9   2013 - Present    Environmental allergies Z91.09   2014 - Present    Eustachian tube dysfunction H69.80   2014 - Present    Need for influenza vaccination Z23   10/7/2014 - Present    Eczema L30.9   10/14/2014 - Present    Abnormal biliary HIDA scan    10/14/2014 - Present    Osteoporosis, post-menopausal M81.0   10/14/2014 - Present    GI problem R19.8   2015 - Present    PMB (postmenopausal bleeding) N95.0   2015 - Present    Abnormal pelvic ultrasound    2015 - Present    Postmenopausal bleeding N95.0   3/13/2015 - Present    Sinus bradycardia R00.1   3/16/2015 - Present    Obesity (BMI  30.0-34.9) E66.9   4/9/2015 - Present    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   4/9/2015 - Present    Insulin resistance E88.81   4/9/2015 - Present    Metabolic syndrome E88.81   4/9/2015 - Present    Inflammation of arteries (CMS-HCC) M30.0   4/9/2015 - Present    Elevated homocysteine (CMS-HCC) E72.11   4/9/2015 - Present    Essential hypertension I10   12/1/2015 - Present    Neck pain on left side M54.2   5/31/2017 - Present      Health Maintenance        Date Due Completion Dates    MAMMOGRAM 3/2/2018 3/2/2016, 1/29/2015, 12/17/2013, 12/12/2013, 10/19/2012, 9/16/2011, 2/26/2010, 2/18/2010, 2/18/2010, 10/7/2008, 9/30/2008, 9/30/2008    BONE DENSITY 10/22/2019 10/22/2014, 10/19/2012, 2/18/2010, 9/30/2008    IMM DTaP/Tdap/Td Vaccine (2 - Td) 5/22/2023 5/22/2013            Current Immunizations     13-VALENT PCV PREVNAR 12/9/2014    Influenza TIV (IM) 10/31/2012 12:05 PM    Influenza Vaccine Adult HD 10/13/2016, 10/16/2015, 10/7/2014  1:04 PM    Pneumococcal polysaccharide vaccine (PPSV-23) 11/13/2010    SHINGLES VACCINE 11/13/2007    Tdap Vaccine 5/22/2013      Below and/or attached are the medications your provider expects you to take. Review all of your home medications and newly ordered medications with your provider and/or pharmacist. Follow medication instructions as directed by your provider and/or pharmacist. Please keep your medication list with you and share with your provider. Update the information when medications are discontinued, doses are changed, or new medications (including over-the-counter products) are added; and carry medication information at all times in the event of emergency situations     Allergies:  FOOD - Anaphylaxis     SULFA DRUGS - Anaphylaxis     ACE INHIBITORS - Cough     NIFEDIPINE - Swelling               Medications  Valid as of: June 27, 2017 -  2:10 PM    Generic Name Brand Name Tablet Size Instructions for use    Ascorbic Acid (Tab) Vitamin C 1000 MG Take  1 Tab by mouth.        Aspirin (Tablet Delayed Response) ECOTRIN 81 MG Take 1 Tab by mouth every day.        Atenolol (Tab) TENORMIN 25 MG TAKE 1 TAB BY MOUTH EVERY DAY.        Cholecalciferol (Cap) vitamin D3 5000 UNITS Take 1 Cap by mouth every day.        Coral Calcium (Tab) Coral Calcium 1000 (390 CA) MG Take 1 Each by mouth every day.        Losartan Potassium (Tab) COZAAR 50 MG TAKE 1 TAB BY MOUTH EVERY DAY.        Lovastatin (Tab) MEVACOR 10 MG TAKE 1 TAB BY MOUTH EVERY DAY.        Melatonin (Tab) Melatonin 5 MG Take 1-2 Tabs by mouth at bedtime as needed.        .                 Medicines prescribed today were sent to:     [facility]/PHARMACY #[id number] - [place] - [address] [place] [address]    Phone: [phone] Fax: [phone]    Open 24 Hours?: No      Medication refill instructions:       If your prescription bottle indicates you have medication refills left, it is not necessary to call your provider’s office. Please contact your pharmacy and they will refill your medication.    If your prescription bottle indicates you do not have any refills left, you may request refills at any time through one of the following ways: The online [employer] system (except Urgent Care), by calling your provider’s office, or by asking your pharmacy to contact your provider’s office with a refill request. Medication refills are processed only during regular business hours and may not be available until the next business day. Your provider may request additional information or to have a follow-up visit with you prior to refilling your medication.   *Please Note: Medication refills are assigned a new Rx number when refilled electronically. Your pharmacy may indicate that no refills were authorized even though a new prescription for the same medication is available at the pharmacy. Please request the medicine by name with the pharmacy before contacting your provider for a refill.        Instructions    Have encouraged  the patient to rest after the acupuncture session today - taking naps or going to sleep early as necessary.  Increase intake of water and refrain from strenuous activities.  Patient may expect to feel transient worsening of symptoms, but this should resolve to benefit in the next day or two after treatment.    The side effects of Acupuncture needle insertion include: minor bruising, bleeding, or pain at the site of needle insertion.  If more worrisome symptoms, such as continued bleeding, severe bruising, or continue pain or altered sensation persist, please contact [facility]'s Medical Acupuncture office @ [phone]       Other Notes About Your Plan     Neurosurg-Song   Phys Med & Rehab-[facility]               YYogahart Access Code: Activation code not generated  Current [employer] Status: Active

## 2017-07-25 ENCOUNTER — OFFICE VISIT (OUTPATIENT)
Dept: INTERNAL MEDICINE | Facility: IMAGING CENTER | Age: 77
End: 2017-07-25
Payer: MEDICARE

## 2017-07-25 VITALS
HEIGHT: 64 IN | RESPIRATION RATE: 16 BRPM | SYSTOLIC BLOOD PRESSURE: 110 MMHG | TEMPERATURE: 97.9 F | OXYGEN SATURATION: 93 % | WEIGHT: 182 LBS | HEART RATE: 53 BPM | BODY MASS INDEX: 31.07 KG/M2 | DIASTOLIC BLOOD PRESSURE: 70 MMHG

## 2017-07-25 DIAGNOSIS — Z00.00 HEALTH CARE MAINTENANCE: ICD-10-CM

## 2017-07-25 DIAGNOSIS — R92.2 DENSE BREASTS: ICD-10-CM

## 2017-07-25 DIAGNOSIS — E55.9 VITAMIN D DEFICIENCY DISEASE: ICD-10-CM

## 2017-07-25 DIAGNOSIS — R92.30 DENSE BREASTS: ICD-10-CM

## 2017-07-25 DIAGNOSIS — I10 ESSENTIAL HYPERTENSION: ICD-10-CM

## 2017-07-25 DIAGNOSIS — R00.1 SINUS BRADYCARDIA: ICD-10-CM

## 2017-07-25 DIAGNOSIS — E78.2 MIXED HYPERLIPIDEMIA WITH APOLIPOPROTEIN E2 VARIANT: ICD-10-CM

## 2017-07-25 DIAGNOSIS — Z12.39 BREAST CANCER SCREENING: ICD-10-CM

## 2017-07-25 DIAGNOSIS — E88.810 METABOLIC SYNDROME: ICD-10-CM

## 2017-07-25 LAB
HBA1C MFR BLD: NORMAL % (ref ?–5.8)
INT CON NEG: NEGATIVE
INT CON POS: POSITIVE

## 2017-07-25 PROCEDURE — 83036 HEMOGLOBIN GLYCOSYLATED A1C: CPT | Performed by: FAMILY MEDICINE

## 2017-07-25 PROCEDURE — 99214 OFFICE O/P EST MOD 30 MIN: CPT | Performed by: FAMILY MEDICINE

## 2017-07-25 NOTE — MR AVS SNAPSHOT
Kacie Rubalcava   2017 9:30 AM   Office Visit   MRN: [id number]    Department:  [facility]   Dept Phone:  [phone]    Description:  Female : 1940   Provider:  Ally Barroso M.D.           Reason for Visit     Other DMV Paperwork      Allergies as of 2017     Allergen Noted Reactions    Food 10/09/2011   Anaphylaxis    mussels    Sulfa Drugs 2008   Anaphylaxis    Ace Inhibitors 2015   Cough    Nifedipine 2015   Swelling    Leg edema      Vital Signs     Blood Pressure Pulse Temperature Respirations Oxygen Saturation Last Menstrual Period    110/70 mmHg 53 36.6 °C (97.9 °F) 16 93% 2002    Smoking Status                   Never Smoker            Basic Information     Date Of Birth Sex Race Ethnicity Preferred Language    1940 Female White Non- English      Problem List              ICD-10-CM Priority Class Noted - Resolved    Degenerative joint disease M19.90   Unknown - Present    Osteoporosis M81.0   Unknown - Present    Hyperlipemia E78.5   Unknown - Present    Dupuytren's contracture of both hands M72.0   2013 - Present    Rash R21   2013 - Present    Breast cancer screening Z12.39   2013 - Present    Dry skin dermatitis L85.3   2013 - Present    GI symptoms R19.8   2013 - Present    Vitamin D deficiency disease E55.9   2013 - Present    Environmental allergies Z91.09   2014 - Present    Eustachian tube dysfunction H69.80   2014 - Present    Need for influenza vaccination Z23   10/7/2014 - Present    Eczema L30.9   10/14/2014 - Present    Abnormal biliary HIDA scan    10/14/2014 - Present    Osteoporosis, post-menopausal M81.0   10/14/2014 - Present    GI problem R19.8   2015 - Present    PMB (postmenopausal bleeding) N95.0   2015 - Present    Abnormal pelvic ultrasound    2015 - Present    Postmenopausal bleeding N95.0   3/13/2015 - Present    Sinus bradycardia R00.1    3/16/2015 - Present    Obesity (BMI 30.0-34.9) E66.9   4/9/2015 - Present    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   4/9/2015 - Present    Insulin resistance E88.81   4/9/2015 - Present    Metabolic syndrome E88.81   4/9/2015 - Present    Inflammation of arteries (CMS-HCC) M30.0   4/9/2015 - Present    Elevated homocysteine (CMS-HCC) E72.11   4/9/2015 - Present    Essential hypertension I10   12/1/2015 - Present    Neck pain on left side M54.2   5/31/2017 - Present      Health Maintenance        Date Due Completion Dates    IMM INFLUENZA (1) 9/1/2017 10/13/2016, 10/16/2015, 10/7/2014, 10/31/2012    MAMMOGRAM 3/2/2018 3/2/2016, 1/29/2015, 12/17/2013, 12/12/2013, 10/19/2012, 9/16/2011, 2/26/2010, 2/18/2010, 2/18/2010, 10/7/2008, 9/30/2008, 9/30/2008    BONE DENSITY 10/22/2019 10/22/2014, 10/19/2012, 2/18/2010, 9/30/2008    IMM DTaP/Tdap/Td Vaccine (2 - Td) 5/22/2023 5/22/2013            Current Immunizations     13-VALENT PCV PREVNAR 12/9/2014    Influenza TIV (IM) 10/31/2012 12:05 PM    Influenza Vaccine Adult HD 10/13/2016, 10/16/2015, 10/7/2014  1:04 PM    Pneumococcal polysaccharide vaccine (PPSV-23) 11/13/2010    SHINGLES VACCINE 11/13/2007    Tdap Vaccine 5/22/2013      Below and/or attached are the medications your provider expects you to take. Review all of your home medications and newly ordered medications with your provider and/or pharmacist. Follow medication instructions as directed by your provider and/or pharmacist. Please keep your medication list with you and share with your provider. Update the information when medications are discontinued, doses are changed, or new medications (including over-the-counter products) are added; and carry medication information at all times in the event of emergency situations     Allergies:  FOOD - Anaphylaxis     SULFA DRUGS - Anaphylaxis     ACE INHIBITORS - Cough     NIFEDIPINE - Swelling               Medications  Valid as of: July 25, 2017 -   9:54 AM    Generic Name Brand Name Tablet Size Instructions for use    Ascorbic Acid (Tab) Vitamin C 1000 MG Take 1 Tab by mouth.        Aspirin (Tablet Delayed Response) ECOTRIN 81 MG Take 1 Tab by mouth every day.        Atenolol (Tab) TENORMIN 25 MG TAKE 1 TAB BY MOUTH EVERY DAY.        Cholecalciferol (Cap) vitamin D3 5000 UNITS Take 1 Cap by mouth every day.        Coral Calcium (Tab) Coral Calcium 1000 (390 CA) MG Take 1 Each by mouth every day.        Losartan Potassium (Tab) COZAAR 50 MG TAKE 1 TAB BY MOUTH EVERY DAY.        Lovastatin (Tab) MEVACOR 10 MG TAKE 1 TAB BY MOUTH EVERY DAY.        Melatonin (Tab) Melatonin 5 MG Take 1-2 Tabs by mouth at bedtime as needed.        .                 Medicines prescribed today were sent to:     [facility]/PHARMACY #[id number] - [place] - [address] [place] [address]    Phone: [phone] Fax: [phone]    Open 24 Hours?: No      Medication refill instructions:       If your prescription bottle indicates you have medication refills left, it is not necessary to call your provider’s office. Please contact your pharmacy and they will refill your medication.    If your prescription bottle indicates you do not have any refills left, you may request refills at any time through one of the following ways: The online Pager system (except Urgent Care), by calling your provider’s office, or by asking your pharmacy to contact your provider’s office with a refill request. Medication refills are processed only during regular business hours and may not be available until the next business day. Your provider may request additional information or to have a follow-up visit with you prior to refilling your medication.   *Please Note: Medication refills are assigned a new Rx number when refilled electronically. Your pharmacy may indicate that no refills were authorized even though a new prescription for the same medication is available at the pharmacy. Please request the medicine  by name with the pharmacy before contacting your provider for a refill.        Other Notes About Your Plan     Neurosurg-Song   Phys Med & Rehab-[facility]               MyChart Access Code: Activation code not generated  Current MyChart Status: Active

## 2017-08-22 ENCOUNTER — OFFICE VISIT (OUTPATIENT)
Dept: INTERNAL MEDICINE | Facility: IMAGING CENTER | Age: 77
End: 2017-08-22
Payer: MEDICARE

## 2017-08-22 VITALS
WEIGHT: 173 LBS | TEMPERATURE: 98.6 F | HEIGHT: 64 IN | RESPIRATION RATE: 14 BRPM | BODY MASS INDEX: 29.53 KG/M2 | HEART RATE: 51 BPM | DIASTOLIC BLOOD PRESSURE: 80 MMHG | SYSTOLIC BLOOD PRESSURE: 122 MMHG | OXYGEN SATURATION: 95 %

## 2017-08-22 DIAGNOSIS — R19.4 CHANGE IN STOOL HABITS: ICD-10-CM

## 2017-08-22 PROBLEM — M54.2 NECK PAIN ON LEFT SIDE: Status: RESOLVED | Noted: 2017-05-31 | Resolved: 2017-08-22

## 2017-08-22 PROCEDURE — 99213 OFFICE O/P EST LOW 20 MIN: CPT | Performed by: FAMILY MEDICINE

## 2017-08-22 NOTE — PROGRESS NOTES
"Chief Complaint   Patient presents with   • Other     black stools x 1 week       HPI:  Patient is a 76 y.o. female established patient who presents today for evaluation of one week history of change in stool - she reports having \"dark, sticky plops of stool\" last Tuesday through Sunday without any abdominal pain nor cramping noted. She also reports significant fecal urgency during this time period and notes that food intake increases these symptoms. She has started using PRN Imodium to control urgency and daily probiotic for GI health within the past week. She denies associated systemic complaints, no urine complaints, no recent abx use, no recent travel or exposure history, and no recent ASA/blood thinner use. She reports a \"brown plop of stool\" Monday but no BM today. She has a history of microscopic lymphocytic colitis treated with Entocort found on colonoscopy in 2010 (record reviewed at visit today) and EGD done in 2015. Pt reports having a colonoscopy also in 2015 but no record reflecting this is available at this time. She reports that her current stool changes are very different from her colitis issues in the past.  I did bring up weight loss noted since last visit, and she reports that she did not use scale last visit and has changed her diet significantly in the last few months in an attempt to improve her health.     Patient Active Problem List    Diagnosis Date Noted   • Essential hypertension 12/01/2015   • Obesity (BMI 30.0-34.9) 04/09/2015   • Mixed hyperlipidemia with apolipoprotein E2 variant 04/09/2015   • Insulin resistance 04/09/2015   • Metabolic syndrome 04/09/2015   • Inflammation of arteries (CMS-HCC) 04/09/2015   • Elevated homocysteine (CMS-HCC) 04/09/2015   • Sinus bradycardia 03/16/2015   • Eczema 10/14/2014   • Abnormal biliary HIDA scan 10/14/2014   • Osteoporosis, post-menopausal 10/14/2014   • Environmental allergies 04/18/2014   • Vitamin D deficiency disease 11/13/2013   • " "Dupuytren's contracture of both hands 05/22/2013   • Degenerative joint disease      Past medical, surgical, family, and social history was reviewed in Epic chart by me today.     Medications and allergies reviewed and updated in Epic chart by me today.     ROS:  Pertinent positives listed above in HPI. All other systems have been reviewed and are negative.    PE:   /80 mmHg  Pulse 51  Temp(Src) 37 °C (98.6 °F)  Resp 14  Ht 1.626 m (5' 4.02\")  Wt 78.472 kg (173 lb)  BMI 29.68 kg/m2  SpO2 95%  LMP 01/01/2002  Vital signs reviewed with patient.     Gen: Well developed; well nourished; no acute distress; age appropriate appearance   CV: Regular rate and rhythm; S1/ S2 present; no murmur, gallop or rub noted  Pulm: No respiratory distress; clear to ascultation b/l; no wheezing or stridor noted b/l  Abd: Adequate bowel sounds noted; soft and nontender; no rebound, rigidity, nor distention; no hepatosplenogmegaly   Extremities: No peripheral edema b/l LE extremities/ no clubbing nor cyanosis noted  Skin: Warm and dry; no rashes noted   Neuro: No focal deficits noted   Psych: AAOx4; mood and affect are appropriate  Rectal exam: no external lesions noted/ normal sphincter tone/ no masses palpated/ non-tender exam/ small amount of brown stool in vault: guaiac negative with bedside testing.     A/P:  1. Change in stool habits  Case discussed at length with patient. I will make urgent referral to [facility] so she can establish care with another provider due to Dr. Cannon retiring and have full evaluation of her complaints. She is hemodynamically stable today with normal exam/ guaiac negative and no indication for labs to be drawn today at our visit.   - REFERRAL TO GASTROENTEROLOGY    Pt will call me if her current condition changes or if she encounters any issues with GI referral. Martina QUISPE was able to contact [facility] medical records and last colonoscopy they have on file is from 2010 per our records.             "

## 2017-08-22 NOTE — MR AVS SNAPSHOT
"        Kacie Rubalcava   2017 1:30 PM   Office Visit   MRN: [id number]    [facility]   Dept Phone:  [phone]    Description:  Female : 1940   Provider:  Ally Barroso M.D.           Reason for Visit     Other black stools x 1 week      Allergies as of 2017     Allergen Noted Reactions    Food 10/09/2011   Anaphylaxis    mussels    Sulfa Drugs 2008   Anaphylaxis    Ace Inhibitors 2015   Cough    Nifedipine 2015   Swelling    Leg edema      You were diagnosed with     Change in stool habits   [[id number]]         Vital Signs     Blood Pressure Pulse Temperature Respirations Height Weight    122/80 mmHg 51 37 °C (98.6 °F) 14 1.626 m (5' 4.02\") 78.472 kg (173 lb)    Body Mass Index Oxygen Saturation Last Menstrual Period Smoking Status          29.68 kg/m2 95% 2002 Never Smoker         Basic Information     Date Of Birth Sex Race Ethnicity Preferred Language    1940 Female White Non- English      Your appointments     Sep 21, 2017  2:30 PM   US LEIGH ANN with [address] BREAST [facility] CENTER ([address] [place])    [address]  [address] [place] [id number] [phone]           Some exams require specific prep instructions that would have been given to you at time of scheduling. If you have any additional questions about the prep instructions, please call Imaging Scheduling at [phone] and press #2.              Problem List              ICD-10-CM Priority Class Noted - Resolved    Degenerative joint disease (Chronic) M19.90   Unknown - Present    Dupuytren's contracture of both hands (Chronic) M72.0   2013 - Present    Vitamin D deficiency disease (Chronic) E55.9   2013 - Present    Environmental allergies (Chronic) Z91.09   2014 - Present    Eczema (Chronic) L30.9   10/14/2014 - Present    Abnormal biliary HIDA scan    10/14/2014 - Present    Osteoporosis, post-menopausal (Chronic) M81.0   10/14/2014 - " Present    Sinus bradycardia (Chronic) R00.1   3/16/2015 - Present    Obesity (BMI 30.0-34.9) (Chronic) E66.9   4/9/2015 - Present    Mixed hyperlipidemia with apolipoprotein E2 variant (Chronic) E78.2   4/9/2015 - Present    Insulin resistance (Chronic) E88.81   4/9/2015 - Present    Metabolic syndrome (Chronic) E88.81   4/9/2015 - Present    Inflammation of arteries (CMS-HCC) (Chronic) M30.0   4/9/2015 - Present    Elevated homocysteine (CMS-HCC) (Chronic) E72.11   4/9/2015 - Present    Essential hypertension (Chronic) I10   12/1/2015 - Present      Health Maintenance        Date Due Completion Dates    IMM INFLUENZA (1) 9/1/2017 10/13/2016, 10/16/2015, 10/7/2014, 10/31/2012    MAMMOGRAM 3/2/2018 3/2/2016, 1/29/2015, 12/17/2013, 12/12/2013, 10/19/2012, 9/16/2011, 2/26/2010, 2/18/2010, 2/18/2010, 10/7/2008, 9/30/2008, 9/30/2008    BONE DENSITY 10/22/2019 10/22/2014, 10/19/2012, 2/18/2010, 9/30/2008    IMM DTaP/Tdap/Td Vaccine (2 - Td) 5/22/2023 5/22/2013            Current Immunizations     13-VALENT PCV PREVNAR 12/9/2014    Influenza TIV (IM) 10/31/2012 12:05 PM    Influenza Vaccine Adult HD 10/13/2016, 10/16/2015, 10/7/2014  1:04 PM    Pneumococcal polysaccharide vaccine (PPSV-23) 11/13/2010    SHINGLES VACCINE 11/13/2007    Tdap Vaccine 5/22/2013      Below and/or attached are the medications your provider expects you to take. Review all of your home medications and newly ordered medications with your provider and/or pharmacist. Follow medication instructions as directed by your provider and/or pharmacist. Please keep your medication list with you and share with your provider. Update the information when medications are discontinued, doses are changed, or new medications (including over-the-counter products) are added; and carry medication information at all times in the event of emergency situations     Allergies:  FOOD - Anaphylaxis     SULFA DRUGS - Anaphylaxis     ACE INHIBITORS - Cough     NIFEDIPINE -  Swelling               Medications  Valid as of: August 23, 2017 -  8:06 AM    Generic Name Brand Name Tablet Size Instructions for use    Ascorbic Acid (Tab) Vitamin C 1000 MG Take 1 Tab by mouth.        Atenolol (Tab) TENORMIN 25 MG TAKE 1 TAB BY MOUTH EVERY DAY.        Cholecalciferol (Cap) vitamin D3 5000 units Take 1 Cap by mouth every day.        Coral Calcium (Tab) Coral Calcium 1000 (390 Ca) MG Take 1 Each by mouth every day.        Losartan Potassium (Tab) COZAAR 50 MG TAKE 1 TAB BY MOUTH EVERY DAY.        Lovastatin (Tab) MEVACOR 10 MG TAKE 1 TAB BY MOUTH EVERY DAY.        Melatonin (Tab) Melatonin 5 MG Take 1-2 Tabs by mouth at bedtime as needed.        .                 Medicines prescribed today were sent to:     [facility]Y #[id number] - [place] - [address] [place] [address]    Phone: [phone] Fax: [phone]    Open 24 Hours?: No      Medication refill instructions:       If your prescription bottle indicates you have medication refills left, it is not necessary to call your provider’s office. Please contact your pharmacy and they will refill your medication.    If your prescription bottle indicates you do not have any refills left, you may request refills at any time through one of the following ways: The online [employer] system (except Urgent Care), by calling your provider’s office, or by asking your pharmacy to contact your provider’s office with a refill request. Medication refills are processed only during regular business hours and may not be available until the next business day. Your provider may request additional information or to have a follow-up visit with you prior to refilling your medication.   *Please Note: Medication refills are assigned a new Rx number when refilled electronically. Your pharmacy may indicate that no refills were authorized even though a new prescription for the same medication is available at the pharmacy. Please request the medicine by name with the  pharmacy before contacting your provider for a refill.        Referral     A referral request has been sent to our patient care coordination department. Please allow 3-5 business days for us to process this request and contact you either by phone or mail. If you do not hear from us by the 5th business day, please call us at [phone].        Other Notes About Your Plan     NSG: Dr. Vallejo   PM: Dr. Bennett   GI: Dr. Cannon               MyChart Access Code: Activation code not generated  Current MyChart Status: Active

## 2017-09-05 ENCOUNTER — OFFICE VISIT (OUTPATIENT)
Dept: INTERNAL MEDICINE | Facility: IMAGING CENTER | Age: 77
End: 2017-09-05
Payer: MEDICARE

## 2017-09-05 VITALS
WEIGHT: 176 LBS | BODY MASS INDEX: 30.05 KG/M2 | HEIGHT: 64 IN | TEMPERATURE: 98.2 F | HEART RATE: 58 BPM | DIASTOLIC BLOOD PRESSURE: 80 MMHG | SYSTOLIC BLOOD PRESSURE: 150 MMHG | OXYGEN SATURATION: 96 % | RESPIRATION RATE: 14 BRPM

## 2017-09-05 DIAGNOSIS — K59.1 FUNCTIONAL DIARRHEA: ICD-10-CM

## 2017-09-05 DIAGNOSIS — K52.838 OTHER MICROSCOPIC COLITIS: ICD-10-CM

## 2017-09-05 PROBLEM — K52.839 MICROSCOPIC COLITIS: Status: ACTIVE | Noted: 2017-09-05

## 2017-09-05 PROCEDURE — 99214 OFFICE O/P EST MOD 30 MIN: CPT | Performed by: FAMILY MEDICINE

## 2017-09-05 RX ORDER — DIPHENOXYLATE HYDROCHLORIDE AND ATROPINE SULFATE 2.5; .025 MG/1; MG/1
1 TABLET ORAL 4 TIMES DAILY PRN
Qty: 30 TAB | Refills: 1 | Status: SHIPPED | OUTPATIENT
Start: 2017-09-05 | End: 2018-03-20

## 2017-09-05 NOTE — PROGRESS NOTES
Chief Complaint   Patient presents with   • Diarrhea       HPI:  Patient is a 76 y.o. female established patient who presents today for follow-up on ongoing stool habit changes. She saw Dr. Maria on 8/31/17 and labs were ordered (pt will obtain those Thursday)/RX given for budesonide with six week recommended f/u. Pt is waiting for RX to be sent via [facility] due to extreme expense of prescription through local pharmacy. She is still having 2-3 brown, diarrhea stools per day and feels that immodium is not effective/ denies associated systemic complaints. She does not want to take systemic prednisone to cover period of time while waiting on budesonide and feels that she would mesh better with a different provider at [facility] for her f/u visit. She reports that all other chronic medical issues are stable today.     Patient Active Problem List    Diagnosis Date Noted   • Microscopic colitis 09/05/2017   • Essential hypertension 12/01/2015   • Obesity (BMI 30.0-34.9) 04/09/2015   • Mixed hyperlipidemia with apolipoprotein E2 variant 04/09/2015   • Insulin resistance 04/09/2015   • Metabolic syndrome 04/09/2015   • Inflammation of arteries (CMS-HCC) 04/09/2015   • Elevated homocysteine (CMS-HCC) 04/09/2015   • Sinus bradycardia 03/16/2015   • Eczema 10/14/2014   • Abnormal biliary HIDA scan 10/14/2014   • Osteoporosis, post-menopausal 10/14/2014   • Environmental allergies 04/18/2014   • Vitamin D deficiency disease 11/13/2013   • Dupuytren's contracture of both hands 05/22/2013   • Degenerative joint disease      Past medical, surgical, family, and social history was reviewed in [facility] chart by me today.     Medications and allergies reviewed and updated in Epic chart by me today.     Progress note from Dr. Maria's visit was reviewed with patient at visit today.     ROS:  Pertinent positives listed above in HPI. All other systems have been reviewed and are negative.    PE:   /80   Pulse (!) 58   Temp 36.8 °C  "(98.2 °F)   Resp 14   Ht 1.626 m (5' 4.02\")   Wt 79.8 kg (176 lb)   LMP 01/01/2002   SpO2 96%   BMI 30.20 kg/m²   Vital signs reviewed with patient.     Gen: Well developed; well nourished; no acute distress; age appropriate appearance   CV: Regular rate and rhythm; S1/ S2 present; no murmur, gallop or rub noted  Pulm: No respiratory distress; clear to ascultation b/l; no wheezing or stridor noted b/l  Abd: Adequate bowel sounds noted; soft and nontender; no rebound, rigidity, nor distention  Extremities: No peripheral edema b/l LE extremities/ no clubbing nor cyanosis noted  Skin: Warm and dry; no rashes noted   Neuro: No focal deficits noted   Psych: AAOx4; mood and affect are appropriate    A/P:  1. Functional diarrhea  Uncontrolled/ Discussed treatment options with patient and will trial lomotil instead of immodium. Also mentioned cholestyramine as additional treatment plan for patient and she will consider if immodium does not work.   - diphenoxylate-atropine (LOMOTIL) 2.5-0.025 MG Tab; Take 1 Tab by mouth 4 times a day as needed for Diarrhea.  Dispense: 30 Tab; Refill: 1    2. Other microscopic colitis  Uncontrolled/ Pt recently evaluated by Dr. Maria - labs pending draw Thursday/ budesonide in transit from [place] via [place] [facility] due to prohibitive costs at local pharmacy. I recommended that she try to see Dr. Amaral, Dr. Finley, or Dr. Esqueda for her six week f/u (also she can see mid level provider in appt to transition to one of these providers for long term f/u).     Pt is to f/u for fasting labs/ annual visit in October with me/ PRN sooner if current condition changes.           "

## 2017-09-08 ENCOUNTER — HOSPITAL ENCOUNTER (OUTPATIENT)
Facility: MEDICAL CENTER | Age: 77
End: 2017-09-08
Attending: INTERNAL MEDICINE
Payer: MEDICARE

## 2017-09-08 ENCOUNTER — HOSPITAL ENCOUNTER (OUTPATIENT)
Dept: LAB | Facility: MEDICAL CENTER | Age: 77
End: 2017-09-08
Attending: INTERNAL MEDICINE
Payer: MEDICARE

## 2017-09-08 LAB
ALBUMIN SERPL BCP-MCNC: 3.8 G/DL (ref 3.2–4.9)
ALBUMIN/GLOB SERPL: 1.3 G/DL
ALP SERPL-CCNC: 41 U/L (ref 30–99)
ALT SERPL-CCNC: 13 U/L (ref 2–50)
ANION GAP SERPL CALC-SCNC: 8 MMOL/L (ref 0–11.9)
AST SERPL-CCNC: 16 U/L (ref 12–45)
BASOPHILS # BLD AUTO: 0.7 % (ref 0–1.8)
BASOPHILS # BLD: 0.05 K/UL (ref 0–0.12)
BILIRUB SERPL-MCNC: 0.5 MG/DL (ref 0.1–1.5)
BUN SERPL-MCNC: 16 MG/DL (ref 8–22)
CALCIUM SERPL-MCNC: 9.2 MG/DL (ref 8.5–10.5)
CHLORIDE SERPL-SCNC: 106 MMOL/L (ref 96–112)
CO2 SERPL-SCNC: 25 MMOL/L (ref 20–33)
CREAT SERPL-MCNC: 1.01 MG/DL (ref 0.5–1.4)
CRP SERPL HS-MCNC: 0.15 MG/DL (ref 0–0.75)
EOSINOPHIL # BLD AUTO: 0.19 K/UL (ref 0–0.51)
EOSINOPHIL NFR BLD: 2.8 % (ref 0–6.9)
ERYTHROCYTE [DISTWIDTH] IN BLOOD BY AUTOMATED COUNT: 43.3 FL (ref 35.9–50)
ERYTHROCYTE [SEDIMENTATION RATE] IN BLOOD BY WESTERGREN METHOD: 20 MM/HOUR (ref 0–30)
GFR SERPL CREATININE-BSD FRML MDRD: 53 ML/MIN/1.73 M 2
GLOBULIN SER CALC-MCNC: 2.9 G/DL (ref 1.9–3.5)
GLUCOSE SERPL-MCNC: 105 MG/DL (ref 65–99)
HCT VFR BLD AUTO: 44.7 % (ref 37–47)
HGB BLD-MCNC: 14.7 G/DL (ref 12–16)
IMM GRANULOCYTES # BLD AUTO: 0.02 K/UL (ref 0–0.11)
IMM GRANULOCYTES NFR BLD AUTO: 0.3 % (ref 0–0.9)
LYMPHOCYTES # BLD AUTO: 2.97 K/UL (ref 1–4.8)
LYMPHOCYTES NFR BLD: 43.6 % (ref 22–41)
MCH RBC QN AUTO: 30.2 PG (ref 27–33)
MCHC RBC AUTO-ENTMCNC: 32.9 G/DL (ref 33.6–35)
MCV RBC AUTO: 91.8 FL (ref 81.4–97.8)
MONOCYTES # BLD AUTO: 0.48 K/UL (ref 0–0.85)
MONOCYTES NFR BLD AUTO: 7 % (ref 0–13.4)
NEUTROPHILS # BLD AUTO: 3.1 K/UL (ref 2–7.15)
NEUTROPHILS NFR BLD: 45.6 % (ref 44–72)
NRBC # BLD AUTO: 0 K/UL
NRBC BLD AUTO-RTO: 0 /100 WBC
PLATELET # BLD AUTO: 298 K/UL (ref 164–446)
PMV BLD AUTO: 10.7 FL (ref 9–12.9)
POTASSIUM SERPL-SCNC: 3.8 MMOL/L (ref 3.6–5.5)
PROT SERPL-MCNC: 6.7 G/DL (ref 6–8.2)
RBC # BLD AUTO: 4.87 M/UL (ref 4.2–5.4)
SODIUM SERPL-SCNC: 139 MMOL/L (ref 135–145)
WBC # BLD AUTO: 6.8 K/UL (ref 4.8–10.8)

## 2017-09-08 PROCEDURE — 85025 COMPLETE CBC W/AUTO DIFF WBC: CPT

## 2017-09-08 PROCEDURE — 86140 C-REACTIVE PROTEIN: CPT

## 2017-09-08 PROCEDURE — 80053 COMPREHEN METABOLIC PANEL: CPT

## 2017-09-08 PROCEDURE — 36415 COLL VENOUS BLD VENIPUNCTURE: CPT

## 2017-09-08 PROCEDURE — 85652 RBC SED RATE AUTOMATED: CPT

## 2017-09-12 LAB
CALPROTECTIN STL-MCNT: 20 UG/G
LACTOFERRIN STL QL IA: NEGATIVE

## 2017-09-21 ENCOUNTER — HOSPITAL ENCOUNTER (OUTPATIENT)
Dept: RADIOLOGY | Facility: MEDICAL CENTER | Age: 77
End: 2017-09-21
Attending: FAMILY MEDICINE
Payer: COMMERCIAL

## 2017-10-11 ENCOUNTER — OFFICE VISIT (OUTPATIENT)
Dept: INTERNAL MEDICINE | Facility: IMAGING CENTER | Age: 77
End: 2017-10-11
Payer: MEDICARE

## 2017-10-11 VITALS
HEIGHT: 64 IN | RESPIRATION RATE: 14 BRPM | OXYGEN SATURATION: 93 % | WEIGHT: 177 LBS | DIASTOLIC BLOOD PRESSURE: 84 MMHG | TEMPERATURE: 97.9 F | BODY MASS INDEX: 30.22 KG/M2 | SYSTOLIC BLOOD PRESSURE: 140 MMHG | HEART RATE: 57 BPM

## 2017-10-11 DIAGNOSIS — R79.89 ELEVATED HOMOCYSTEINE: Chronic | ICD-10-CM

## 2017-10-11 DIAGNOSIS — Z23 NEEDS FLU SHOT: ICD-10-CM

## 2017-10-11 DIAGNOSIS — M81.0 OSTEOPOROSIS, POST-MENOPAUSAL: Chronic | ICD-10-CM

## 2017-10-11 DIAGNOSIS — Z91.09 ENVIRONMENTAL ALLERGIES: Chronic | ICD-10-CM

## 2017-10-11 DIAGNOSIS — I77.6 INFLAMMATION OF ARTERIES (HCC): Chronic | ICD-10-CM

## 2017-10-11 DIAGNOSIS — E88.810 METABOLIC SYNDROME: Chronic | ICD-10-CM

## 2017-10-11 DIAGNOSIS — I10 ESSENTIAL HYPERTENSION: Chronic | ICD-10-CM

## 2017-10-11 DIAGNOSIS — Z12.31 ENCOUNTER FOR SCREENING MAMMOGRAM FOR BREAST CANCER: ICD-10-CM

## 2017-10-11 DIAGNOSIS — R00.1 SINUS BRADYCARDIA: Chronic | ICD-10-CM

## 2017-10-11 DIAGNOSIS — E78.2 MIXED HYPERLIPIDEMIA WITH APOLIPOPROTEIN E2 VARIANT: Chronic | ICD-10-CM

## 2017-10-11 DIAGNOSIS — E55.9 VITAMIN D DEFICIENCY DISEASE: Chronic | ICD-10-CM

## 2017-10-11 DIAGNOSIS — Z00.00 MEDICARE ANNUAL WELLNESS VISIT, SUBSEQUENT: ICD-10-CM

## 2017-10-11 DIAGNOSIS — K52.838 OTHER MICROSCOPIC COLITIS: ICD-10-CM

## 2017-10-11 DIAGNOSIS — M15.9 PRIMARY OSTEOARTHRITIS INVOLVING MULTIPLE JOINTS: Chronic | ICD-10-CM

## 2017-10-11 DIAGNOSIS — E88.819 INSULIN RESISTANCE: Chronic | ICD-10-CM

## 2017-10-11 DIAGNOSIS — E66.9 OBESITY (BMI 30.0-34.9): Chronic | ICD-10-CM

## 2017-10-11 PROCEDURE — G0008 ADMIN INFLUENZA VIRUS VAC: HCPCS | Performed by: FAMILY MEDICINE

## 2017-10-11 PROCEDURE — 90662 IIV NO PRSV INCREASED AG IM: CPT | Performed by: FAMILY MEDICINE

## 2017-10-11 PROCEDURE — G0439 PPPS, SUBSEQ VISIT: HCPCS | Mod: 25 | Performed by: FAMILY MEDICINE

## 2017-10-11 RX ORDER — ATENOLOL 25 MG/1
25 TABLET ORAL
Qty: 90 TAB | Refills: 3 | Status: SHIPPED | OUTPATIENT
Start: 2017-10-11 | End: 2018-10-15 | Stop reason: SDUPTHER

## 2017-10-11 ASSESSMENT — PATIENT HEALTH QUESTIONNAIRE - PHQ9: CLINICAL INTERPRETATION OF PHQ2 SCORE: 0

## 2017-10-11 NOTE — PROGRESS NOTES
CC:   Medicare Annual Wellness Visit    HPI:  Kacie is a 77 y.o. Female here for Medicare Annual Wellness Visit. She is 4 weeks into her Entocort treatment for recurrence of microscopic colitis and feels 90% improved. She is using [facility] online for this medication ( 1/10 price of retail [place] pharmacy). She has chronic essential HTN historically controlled on daily medication with more recent blood pressure variability noted. She has chronic vitamin D deficiency and chronic metabolic syndrome. She also suffers from chronic obesity and chronic DJD/osteoporosis. 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 She previously did not want mammogram and wanted a Sonocine - subsequently found copay to be too costly so she would like order for screening mammogram. She has no current complaints today at our visit.     Patient Active Problem List    Diagnosis Date Noted   • Microscopic colitis 09/05/2017   • Essential hypertension 12/01/2015   • Obesity (BMI 30.0-34.9) 04/09/2015   • Mixed hyperlipidemia with apolipoprotein E2 variant 04/09/2015   • Insulin resistance 04/09/2015   • Metabolic syndrome 04/09/2015   • Inflammation of arteries (CMS-[facility]) 04/09/2015   • Elevated homocysteine (CMS-HCC) 04/09/2015   • Sinus bradycardia 03/16/2015   • Eczema 10/14/2014   • Abnormal biliary HIDA scan 10/14/2014   • Osteoporosis, post-menopausal 10/14/2014   • Environmental allergies 04/18/2014   • Vitamin D deficiency disease 11/13/2013   • Dupuytren's contracture of both hands 05/22/2013   • Degenerative joint disease      Current Outpatient Prescriptions   Medication Sig Dispense Refill   • atenolol (TENORMIN) 25 MG Tab Take 1 Tab by mouth every day. 90 Tab 3   • Budesonide (ENTOCORT EC PO) Take 3 Tabs by mouth every morning.     • losartan (COZAAR) 50 MG Tab TAKE 1 TAB BY MOUTH EVERY DAY. 90 Tab 3   • lovastatin (MEVACOR) 10 MG tablet TAKE 1 TAB BY  MOUTH EVERY DAY. 90 Tab 3   • Cholecalciferol (VITAMIN D3) 5000 UNITS CAPS Take 1 Cap by mouth every day.     • Melatonin 5 MG TABS Take 1-2 Tabs by mouth at bedtime as needed.     • Coral Calcium 1000 (390 CA) MG TABS Take 1 Each by mouth every day. 60 Each    • Ascorbic Acid (VITAMIN C) 1000 MG TABS Take 1 Tab by mouth. 60 Each    • diphenoxylate-atropine (LOMOTIL) 2.5-0.025 MG Tab Take 1 Tab by mouth 4 times a day as needed for Diarrhea. 30 Tab 1   • Loperamide HCl (IMODIUM PO) Take  by mouth.       No current facility-administered medications for this visit.       Current supplements: see MAR  Chronic narcotic pain medications: No  Allergies: Food; Sulfa drugs; Ace inhibitors; and Nifedipine  Exercise: walks often   Current social contact/activities: very socially active/ walks    Screening:  Depression Screening    Little interest or pleasure in doing things?  0 - not at all  Feeling down, depressed , or hopeless? 0 - not at all  Patient Health Questionnaire Score: 0     Screening for Cognitive Impairment  Three Minute Recall (apple, watch, mario)  3/3    Draw clock face with all 12 numbers set to the hand to show 10 minutes past 11 o'clock  1    Cognitive concerns identified deferred for follow up unless specifically addressed in assessment and plan.    Fall Risk Assessment  Has the patient had two or more falls in the last year or any fall with injury in the last year?  No    Safety Assessment  Throw rugs on floor.  No  Handrails on all stairs.  Yes  Good lighting in all hallways.  Yes  Difficulty hearing.  No  Patient counseled about all safety risks that were identified.    Functional Assessment ADLs  Are there any barriers preventing you from cooking for yourself or meeting nutritional needs?  No.    Are there any barriers preventing you from driving safely or obtaining transportation?  No.    Are there any barriers preventing you from using a telephone or calling for help?  No.    Are there any barriers  preventing you from shopping?  No.    Are there any barriers preventing you from taking care of your own finances?  No.    Are there any barriers preventing you from managing your medications?  No.    Are currently engaging any exercise or physical activity?  Yes.       Health Maintenance Summary                MAMMOGRAM Overdue 3/2/2017      Done 3/2/2016 MA-SCREEN MAMMO W/CAD-BILAT     Patient has more history with this topic...    IMM INFLUENZA Overdue 9/1/2017      Done 10/13/2016 Imm Admin: Influenza Vaccine Adult HD     Patient has more history with this topic...    BONE DENSITY Next Due 10/22/2019      Done 10/22/2014 DS-BONE DENSITY STUDY (DEXA)     Patient has more history with this topic...    IMM DTaP/Tdap/Td Vaccine Next Due 5/22/2023      Done 5/22/2013 Imm Admin: Tdap Vaccine        Patient Care Team:  Ally Barroso M.D. as PCP - General (Family Medicine)  Ca Duque R.N. as Registered Nurse    Social History   Substance Use Topics   • Smoking status: Never Smoker   • Smokeless tobacco: Never Used   • Alcohol use 3.6 oz/week     6 Standard drinks or equivalent per week      Comment: 3-4 glasses per week     Family History   Problem Relation Age of Onset   • Lung Disease Mother    • Cancer Mother      lung cancer   • Cancer Paternal Aunt      breast     She  has a past medical history of Anxiety; Arthritis (03/12/15); Degenerative joint disease; High cholesterol; HTN (hypertension), benign; Hyperlipemia; Hypertension; Microscopic colitis (2010); Neck pain on left side (5/31/2017); Osteoporosis; Other specified symptom associated with female genital organs; and Snoring. She also has no past medical history of Anesthesia; Breast cancer (CMS-[facility]); Cold; or Dental disorder.   Past Surgical History:   Procedure Laterality Date   • HYSTEROSCOPY WITH VIDEO DIAGNOSTIC  3/13/2015    Performed by Alexandria Keenan M.D. at SURGERY SAME DAY [facility] ORS   • DILATION AND CURETTAGE  3/13/2015     "Performed by Alexandria Keenan M.D. at SURGERY SAME DAY [facility] ORS   • BREAST BIOPSY  11/13/08    Performed by ISRAEL HICKS at SURGERY SAME DAY [facility] ORS X3   • COLON RESECTION  2000    polyps   • APPENDECTOMY     • TONSILLECTOMY     • US-NEEDLE CORE BX-BREAST PANEL         ROS:    All positives noted in HPI. All others reviewed and are negative.    Ostomy or other tubes or amputations: no  Chronic oxygen use: no  Last eye exam: UTD  : denies incontinence  Gait: stable  Hearing: adequate  Dentition: adequate    Exam:   Blood pressure 140/84, pulse (!) 57, temperature 36.6 °C (97.9 °F), resp. rate 14, height 1.626 m (5' 4.02\"), weight 80.3 kg (177 lb), last menstrual period 01/01/2002, SpO2 93 %. Body mass index is 30.37 kg/m².    Gen: Well developed; well nourished; no acute distress; age appropriate appearance   HEENT: Normocephalic; atraumatic; PEERLA b/l; sclera clear b/l; b/l external auditory canals WNL; b/l TM WNL; oropharynx clear; oral mucosa moist; tongue midline; dentition adequate   Neck: No adenopathy; no thyromegaly  CV: Regular rate and rhythm; S1/ S2 present; no murmur, gallop or rub noted  Pulm: No respiratory distress; clear to ascultation b/l; no wheezing or stridor noted b/l  Abd: Adequate bowel sounds noted; soft and nontender; no rebound, rigidity, nor distention  Extremities: No peripheral edema b/l LE extremities/ no clubbing nor cyanosis noted; obese   Skin: Warm and dry; no rashes noted   Neuro: No focal deficits noted   Psych: AAOx4; mood and affect are appropriate    Assessment and Plan:  1. Needs flu shot  Flu vaccine given at visit today.   - INFLUENZA VACCINE, HIGH DOSE (65+ ONLY)    2. Encounter for screening mammogram for breast cancer  Copay for Sonocine too costly for pt - she is willing to have screening mammogram done.   - MA-MAMMO SCREENING BILAT W/DEEPTI W/CAD; Future    3. Inflammation of arteries (CMS-HCC)  Stable    4. Other microscopic colitis  Markedly improved after " 4 weeks of Entocort treatment/ pt to continue daily treatment and GI f/u.     5. Chronic mixed hyperlipidemia with apolipoprotein E2 variant  Stable/ pt to continue daily/ due for fasting lipid panel     6. Chronic obesity (BMI 30.0-34.9)  Stable/ counseling provided to maintain healthy diet and daily movement    7. Chronic osteoporosis, post-menopausal  Stable/ pt to continue daily calcium, vitamin D, and weight bearing activity    8. Chronic sinus bradycardia  Stable/ asymptomatic/ partially due to beta blockade     9. Chronic vitamin D deficiency disease  Stable/ pt to continue daily Vitamin D supplementation/ due for vitamin D level    10. Elevated homocysteine (CMS-HCC)  Stable    11. Chronic essential hypertension  Variable control/ pt to continue daily medication and home BP monitoring - if BP continues to rise, she will contact me.     12. Chronic environmental allergies  Stable/ no recent exacerbation    13. Chronic primary osteoarthritis involving multiple joints  Stable/ no recent exacerbation    14. Chronic insulin resistance  Stable/ due for HgA1c level    15. Chronic metabolic syndrome  Stable/ healthy diet and weight management education stressed with her at today's visit. TSH and B12 labs pending.     16. Medicare annual wellness visit, subsequent    No new issues identified at visit today.     Services needed: no new services needed at this time  Health Care Screening: recommendations as per orders if indicated.  Referrals offered: none required   Counseling provided today:  · Prevent falls and reduce trip hazards; Secure or remove rugs if present   · Maintain working fire alarm and carbon monoxide detectors   · Engage in regular physical activity daily and social activities weekly as tolerated     Pt to return for fasting lab draw in the near future. She is doing well overall and will contact me if her current condition changes.

## 2017-10-31 ENCOUNTER — HOSPITAL ENCOUNTER (OUTPATIENT)
Dept: RADIOLOGY | Facility: MEDICAL CENTER | Age: 77
End: 2017-10-31
Attending: FAMILY MEDICINE
Payer: MEDICARE

## 2017-10-31 DIAGNOSIS — Z12.31 ENCOUNTER FOR SCREENING MAMMOGRAM FOR BREAST CANCER: ICD-10-CM

## 2017-10-31 PROCEDURE — G0202 SCR MAMMO BI INCL CAD: HCPCS

## 2017-11-15 ENCOUNTER — NON-PROVIDER VISIT (OUTPATIENT)
Dept: INTERNAL MEDICINE | Facility: IMAGING CENTER | Age: 77
End: 2017-11-15
Payer: MEDICARE

## 2017-11-15 ENCOUNTER — HOSPITAL ENCOUNTER (OUTPATIENT)
Facility: MEDICAL CENTER | Age: 77
End: 2017-11-15
Attending: FAMILY MEDICINE
Payer: MEDICARE

## 2017-11-15 VITALS — DIASTOLIC BLOOD PRESSURE: 92 MMHG | SYSTOLIC BLOOD PRESSURE: 170 MMHG | HEART RATE: 58 BPM

## 2017-11-15 DIAGNOSIS — E78.2 MIXED HYPERLIPIDEMIA WITH APOLIPOPROTEIN E2 VARIANT: ICD-10-CM

## 2017-11-15 DIAGNOSIS — E55.9 VITAMIN D DEFICIENCY DISEASE: ICD-10-CM

## 2017-11-15 DIAGNOSIS — Z00.00 HEALTH CARE MAINTENANCE: ICD-10-CM

## 2017-11-15 DIAGNOSIS — Z01.89 ENCOUNTER FOR ROUTINE LABORATORY TESTING: ICD-10-CM

## 2017-11-15 DIAGNOSIS — I10 HYPERTENSION, UNSPECIFIED TYPE: ICD-10-CM

## 2017-11-15 DIAGNOSIS — I10 ESSENTIAL HYPERTENSION: ICD-10-CM

## 2017-11-15 LAB
25(OH)D3 SERPL-MCNC: 43 NG/ML (ref 30–100)
ALBUMIN SERPL BCP-MCNC: 3.8 G/DL (ref 3.2–4.9)
ALBUMIN/GLOB SERPL: 1.4 G/DL
ALP SERPL-CCNC: 48 U/L (ref 30–99)
ALT SERPL-CCNC: 20 U/L (ref 2–50)
ANION GAP SERPL CALC-SCNC: 11 MMOL/L (ref 0–11.9)
AST SERPL-CCNC: 18 U/L (ref 12–45)
BASOPHILS # BLD AUTO: 0.4 % (ref 0–1.8)
BASOPHILS # BLD: 0.03 K/UL (ref 0–0.12)
BILIRUB SERPL-MCNC: 0.5 MG/DL (ref 0.1–1.5)
BUN SERPL-MCNC: 24 MG/DL (ref 8–22)
CALCIUM SERPL-MCNC: 9.3 MG/DL (ref 8.5–10.5)
CHLORIDE SERPL-SCNC: 106 MMOL/L (ref 96–112)
CHOLEST SERPL-MCNC: 143 MG/DL (ref 100–199)
CO2 SERPL-SCNC: 22 MMOL/L (ref 20–33)
CREAT SERPL-MCNC: 0.83 MG/DL (ref 0.5–1.4)
EOSINOPHIL # BLD AUTO: 0.32 K/UL (ref 0–0.51)
EOSINOPHIL NFR BLD: 3.9 % (ref 0–6.9)
ERYTHROCYTE [DISTWIDTH] IN BLOOD BY AUTOMATED COUNT: 47.2 FL (ref 35.9–50)
GFR SERPL CREATININE-BSD FRML MDRD: >60 ML/MIN/1.73 M 2
GLOBULIN SER CALC-MCNC: 2.8 G/DL (ref 1.9–3.5)
GLUCOSE SERPL-MCNC: 109 MG/DL (ref 65–99)
HCT VFR BLD AUTO: 44.3 % (ref 37–47)
HDLC SERPL-MCNC: 66 MG/DL
HGB BLD-MCNC: 14.9 G/DL (ref 12–16)
IMM GRANULOCYTES # BLD AUTO: 0.02 K/UL (ref 0–0.11)
IMM GRANULOCYTES NFR BLD AUTO: 0.2 % (ref 0–0.9)
LDLC SERPL CALC-MCNC: 56 MG/DL
LYMPHOCYTES # BLD AUTO: 3.46 K/UL (ref 1–4.8)
LYMPHOCYTES NFR BLD: 41.7 % (ref 22–41)
MCH RBC QN AUTO: 31.2 PG (ref 27–33)
MCHC RBC AUTO-ENTMCNC: 33.6 G/DL (ref 33.6–35)
MCV RBC AUTO: 92.7 FL (ref 81.4–97.8)
MONOCYTES # BLD AUTO: 0.65 K/UL (ref 0–0.85)
MONOCYTES NFR BLD AUTO: 7.8 % (ref 0–13.4)
NEUTROPHILS # BLD AUTO: 3.82 K/UL (ref 2–7.15)
NEUTROPHILS NFR BLD: 46 % (ref 44–72)
NRBC # BLD AUTO: 0 K/UL
NRBC BLD AUTO-RTO: 0 /100 WBC
PLATELET # BLD AUTO: 294 K/UL (ref 164–446)
PMV BLD AUTO: 10.5 FL (ref 9–12.9)
POTASSIUM SERPL-SCNC: 3.8 MMOL/L (ref 3.6–5.5)
PROT SERPL-MCNC: 6.6 G/DL (ref 6–8.2)
RBC # BLD AUTO: 4.78 M/UL (ref 4.2–5.4)
SODIUM SERPL-SCNC: 139 MMOL/L (ref 135–145)
TRIGL SERPL-MCNC: 105 MG/DL (ref 0–149)
TSH SERPL DL<=0.005 MIU/L-ACNC: 2.44 UIU/ML (ref 0.3–3.7)
VIT B12 SERPL-MCNC: 298 PG/ML (ref 211–911)
WBC # BLD AUTO: 8.3 K/UL (ref 4.8–10.8)

## 2017-11-15 PROCEDURE — 80061 LIPID PANEL: CPT

## 2017-11-15 PROCEDURE — 82306 VITAMIN D 25 HYDROXY: CPT

## 2017-11-15 PROCEDURE — 85025 COMPLETE CBC W/AUTO DIFF WBC: CPT

## 2017-11-15 PROCEDURE — 80053 COMPREHEN METABOLIC PANEL: CPT

## 2017-11-15 PROCEDURE — 84443 ASSAY THYROID STIM HORMONE: CPT

## 2017-11-15 PROCEDURE — 82607 VITAMIN B-12: CPT

## 2017-11-15 NOTE — NON-PROVIDER
"Patient requests BP check because she has been \"running higher.\"  Today 170/92  Has tried 3 home BP cuffs without success.  Readings on pharmacy BP cuffs ~ 150's/78  Per Dr Barroso - increase losartan to 100mg/day with f/u appointment in 1 month.  "

## 2017-12-13 ENCOUNTER — OFFICE VISIT (OUTPATIENT)
Dept: INTERNAL MEDICINE | Facility: IMAGING CENTER | Age: 77
End: 2017-12-13
Payer: MEDICARE

## 2017-12-13 VITALS
SYSTOLIC BLOOD PRESSURE: 160 MMHG | TEMPERATURE: 98.1 F | BODY MASS INDEX: 30.39 KG/M2 | HEART RATE: 53 BPM | RESPIRATION RATE: 14 BRPM | OXYGEN SATURATION: 96 % | HEIGHT: 64 IN | WEIGHT: 178 LBS | DIASTOLIC BLOOD PRESSURE: 90 MMHG

## 2017-12-13 DIAGNOSIS — E66.9 OBESITY (BMI 30.0-34.9): Chronic | ICD-10-CM

## 2017-12-13 DIAGNOSIS — I10 ESSENTIAL HYPERTENSION: Chronic | ICD-10-CM

## 2017-12-13 PROCEDURE — 99214 OFFICE O/P EST MOD 30 MIN: CPT | Performed by: FAMILY MEDICINE

## 2017-12-13 RX ORDER — LOSARTAN POTASSIUM 100 MG/1
100 TABLET ORAL DAILY
Qty: 90 TAB | Refills: 4 | Status: SHIPPED | OUTPATIENT
Start: 2017-12-13 | End: 2018-12-26 | Stop reason: SDUPTHER

## 2017-12-13 RX ORDER — CHOLECALCIFEROL (VITAMIN D3) 25 MCG
1 TABLET,CHEWABLE ORAL DAILY
COMMUNITY
End: 2022-11-07

## 2017-12-13 NOTE — PROGRESS NOTES
"Chief Complaint   Patient presents with   • Hypertension       HPI:  Patient is a 77 y.o. female established patient who presents today to for one month f/u counseling on uncontrolled essential HTN. She is now taking atenolol 25 mg/ losartan 100 mg daily with improved home BP control. Historically, she is very sensitive to medication changes and often resistant to tight medical control overall. She reports that she is feeling quite well with this current medication routine daily and reports that her microscopic colitis on entacort is pending follow-up evaluation in January with GI team. She has no other complaints at this time.     Patient Active Problem List    Diagnosis Date Noted   • Microscopic colitis 09/05/2017   • Essential hypertension 12/01/2015   • Obesity (BMI 30.0-34.9) 04/09/2015   • Mixed hyperlipidemia with apolipoprotein E2 variant 04/09/2015   • Insulin resistance 04/09/2015   • Metabolic syndrome 04/09/2015   • Inflammation of arteries (CMS-HCC) 04/09/2015   • Elevated homocysteine (CMS-HCC) 04/09/2015   • Sinus bradycardia 03/16/2015   • Eczema 10/14/2014   • Abnormal biliary HIDA scan 10/14/2014   • Osteoporosis, post-menopausal 10/14/2014   • Environmental allergies 04/18/2014   • Vitamin D deficiency disease 11/13/2013   • Dupuytren's contracture of both hands 05/22/2013   • Degenerative joint disease      Past medical, surgical, family, and social history was reviewed and updated in Epic chart by me today.     Medications and allergies reviewed and updated in Epic chart by me today.     ROS:  Pertinent positives listed above in HPI. All other systems have been reviewed and are negative.    PE:   /90   Pulse (!) 53   Temp 36.7 °C (98.1 °F)   Resp 14   Ht 1.626 m (5' 4.02\")   Wt 80.7 kg (178 lb)   LMP 01/01/2002   SpO2 96%   BMI 30.54 kg/m²   Vital signs reviewed with patient.     Gen: Well developed; well nourished; no acute distress; age appropriate appearance   Skin: Warm and " dry; no rashes noted   Neuro: No focal deficits noted   Psych: AAOx4; mood and affect are appropriate    A/P:  1. Chronic essential hypertension  Improved control/ pt to continue losartan 100 mg/ atenolol 25 mg daily and continue to monitor home BP. She is very sensitive to medications overall and is aware that her BP is higher than recommended levels - must balance benefit vs risk in this patient, and I truly think that she would feel poorly with addition of another BP agent/ cannot increase atenolol due to low HR historically.   - losartan (COZAAR) 100 MG Tab; Take 1 Tab by mouth every day.  Dispense: 90 Tab; Refill: 4    2. Chronic obesity (BMI 30.0-34.9)  - Patient identified as having weight management issue.  Appropriate orders and counseling given.     Face to face time: 25 minutes with greater than 50% of time spent with direct patient contact/medical management.   Pt will call our office if current condition changes as needed.

## 2018-01-05 ENCOUNTER — OFFICE VISIT (OUTPATIENT)
Dept: INTERNAL MEDICINE | Facility: IMAGING CENTER | Age: 78
End: 2018-01-05
Payer: MEDICARE

## 2018-01-05 VITALS
WEIGHT: 178 LBS | SYSTOLIC BLOOD PRESSURE: 128 MMHG | TEMPERATURE: 98.2 F | RESPIRATION RATE: 14 BRPM | HEIGHT: 64 IN | OXYGEN SATURATION: 94 % | BODY MASS INDEX: 30.39 KG/M2 | HEART RATE: 54 BPM | DIASTOLIC BLOOD PRESSURE: 72 MMHG

## 2018-01-05 DIAGNOSIS — I10 ESSENTIAL HYPERTENSION: Chronic | ICD-10-CM

## 2018-01-05 DIAGNOSIS — J06.9 UPPER RESPIRATORY TRACT INFECTION, UNSPECIFIED TYPE: ICD-10-CM

## 2018-01-05 LAB
FLUAV+FLUBV AG SPEC QL IA: NEGATIVE
INT CON NEG: NEGATIVE
INT CON POS: POSITIVE

## 2018-01-05 PROCEDURE — 87804 INFLUENZA ASSAY W/OPTIC: CPT | Performed by: FAMILY MEDICINE

## 2018-01-05 PROCEDURE — 99214 OFFICE O/P EST MOD 30 MIN: CPT | Performed by: FAMILY MEDICINE

## 2018-01-05 RX ORDER — AMOXICILLIN AND CLAVULANATE POTASSIUM 875; 125 MG/1; MG/1
1 TABLET, FILM COATED ORAL 2 TIMES DAILY
Qty: 10 TAB | Refills: 0 | Status: SHIPPED | OUTPATIENT
Start: 2018-01-05 | End: 2018-01-10

## 2018-01-05 NOTE — PROGRESS NOTES
"Chief Complaint   Patient presents with   • Cough       HPI:  Patient is a 77 y.o. female established patient who presents today for evaluation of new cough, congestion, malaise present for the past week. She denies associated N/V/D/F/bowel or bladder problems and has used Mucinex and Coriciden for symptom control. She also suffers from chronic essential HTN and has responded well to increased losartan dose of 100 mg daily. She is in good spirits today despite being ill this past week.     Patient Active Problem List    Diagnosis Date Noted   • Microscopic colitis 09/05/2017   • Essential hypertension 12/01/2015   • Obesity (BMI 30.0-34.9) 04/09/2015   • Mixed hyperlipidemia with apolipoprotein E2 variant 04/09/2015   • Insulin resistance 04/09/2015   • Metabolic syndrome 04/09/2015   • Sinus bradycardia 03/16/2015   • Eczema 10/14/2014   • Abnormal biliary HIDA scan 10/14/2014   • Osteoporosis, post-menopausal 10/14/2014   • Environmental allergies 04/18/2014   • Vitamin D deficiency disease 11/13/2013   • Dupuytren's contracture of both hands 05/22/2013   • Degenerative joint disease        Past medical, surgical, family, and social history was reviewed and updated in Epic chart by me today.     Medications and allergies reviewed and updated in Epic chart by me today.     ROS:  Pertinent positives listed above in HPI. All other systems have been reviewed and are negative.    PE:   /72   Pulse (!) 54   Temp 36.8 °C (98.2 °F)   Resp 14   Ht 1.626 m (5' 4.02\")   Wt 80.7 kg (178 lb)   LMP 01/01/2002   SpO2 94%   BMI 30.54 kg/m²   Vital signs reviewed with patient.     Gen: Well developed; well nourished; no acute distress; age appropriate appearance   HEENT: Normocephalic; atraumatic; PEERLA b/l; sclera clear b/l; b/l external auditory canals WNL; b/l TM WNL; oropharynx clear; oral mucosa moist; tongue midline; dentition adequate   Neck: No adenopathy; no thyromegaly  CV: Regular rate and rhythm; S1/ S2 " present; no murmur, gallop or rub noted  Pulm: No respiratory distress; decreased BS b/l; no wheezing or stridor noted b/l; wet cough  Abd: Adequate bowel sounds noted; soft and nontender; no rebound, rigidity, nor distention  Extremities: No peripheral edema b/l LE extremities/ no clubbing nor cyanosis noted  Skin: Warm and dry; no rashes noted   Neuro: No focal deficits noted   Psych: AAOx4; mood and affect are appropriate    A/P:  1. Acute upper respiratory tract infection, unspecified type  POCT negative for influenza today. Pt is to start Augmentin today, rest, maintain hydration, continue OTC robitussin PRN for cough control.   - amoxicillin-clavulanate (AUGMENTIN) 875-125 MG Tab; Take 1 Tab by mouth 2 times a day for 5 days.  Dispense: 10 Tab; Refill: 0  - POCT Influenza A/B    2. Chronic essential hypertension  Improved/ pt to continue losartan 100 mg and atenolol 25 mg daily - continue to follow.    Pt to call our office if current condition does not improve with treatment plans above.

## 2018-01-22 RX ORDER — LOVASTATIN 10 MG/1
10 TABLET ORAL
Qty: 90 TAB | Refills: 3 | Status: SHIPPED | OUTPATIENT
Start: 2018-01-22 | End: 2019-01-27 | Stop reason: SDUPTHER

## 2018-01-23 DIAGNOSIS — E78.2 MIXED HYPERLIPIDEMIA WITH APOLIPOPROTEIN E2 VARIANT: Chronic | ICD-10-CM

## 2018-01-23 RX ORDER — LOVASTATIN 10 MG/1
10 TABLET ORAL
Qty: 90 TAB | Refills: 3 | Status: SHIPPED | OUTPATIENT
Start: 2018-01-23 | End: 2018-03-20

## 2018-03-20 ENCOUNTER — HOSPITAL ENCOUNTER (EMERGENCY)
Facility: MEDICAL CENTER | Age: 78
End: 2018-03-20
Attending: EMERGENCY MEDICINE
Payer: MEDICARE

## 2018-03-20 VITALS
BODY MASS INDEX: 31.01 KG/M2 | OXYGEN SATURATION: 91 % | SYSTOLIC BLOOD PRESSURE: 180 MMHG | TEMPERATURE: 98.1 F | HEIGHT: 64 IN | DIASTOLIC BLOOD PRESSURE: 82 MMHG | WEIGHT: 181.66 LBS | HEART RATE: 49 BPM | RESPIRATION RATE: 16 BRPM

## 2018-03-20 DIAGNOSIS — N12 PYELONEPHRITIS: ICD-10-CM

## 2018-03-20 LAB
ALBUMIN SERPL BCP-MCNC: 3.8 G/DL (ref 3.2–4.9)
ALBUMIN/GLOB SERPL: 1.3 G/DL
ALP SERPL-CCNC: 45 U/L (ref 30–99)
ALT SERPL-CCNC: 22 U/L (ref 2–50)
ANION GAP SERPL CALC-SCNC: 6 MMOL/L (ref 0–11.9)
APPEARANCE UR: CLEAR
AST SERPL-CCNC: 25 U/L (ref 12–45)
BACTERIA #/AREA URNS HPF: ABNORMAL /HPF
BASOPHILS # BLD AUTO: 0.4 % (ref 0–1.8)
BASOPHILS # BLD: 0.04 K/UL (ref 0–0.12)
BILIRUB SERPL-MCNC: 0.7 MG/DL (ref 0.1–1.5)
BILIRUB UR QL STRIP.AUTO: NEGATIVE
BUN SERPL-MCNC: 16 MG/DL (ref 8–22)
CALCIUM SERPL-MCNC: 9.3 MG/DL (ref 8.4–10.2)
CHLORIDE SERPL-SCNC: 104 MMOL/L (ref 96–112)
CO2 SERPL-SCNC: 25 MMOL/L (ref 20–33)
COLOR UR: YELLOW
CREAT SERPL-MCNC: 1.1 MG/DL (ref 0.5–1.4)
CULTURE IF INDICATED INDCX: YES UA CULTURE
EOSINOPHIL # BLD AUTO: 0.1 K/UL (ref 0–0.51)
EOSINOPHIL NFR BLD: 1 % (ref 0–6.9)
EPI CELLS #/AREA URNS HPF: ABNORMAL /HPF
ERYTHROCYTE [DISTWIDTH] IN BLOOD BY AUTOMATED COUNT: 41 FL (ref 35.9–50)
GLOBULIN SER CALC-MCNC: 2.9 G/DL (ref 1.9–3.5)
GLUCOSE SERPL-MCNC: 124 MG/DL (ref 65–99)
GLUCOSE UR STRIP.AUTO-MCNC: NEGATIVE MG/DL
HCT VFR BLD AUTO: 44.5 % (ref 37–47)
HGB BLD-MCNC: 15.5 G/DL (ref 12–16)
IMM GRANULOCYTES # BLD AUTO: 0.03 K/UL (ref 0–0.11)
IMM GRANULOCYTES NFR BLD AUTO: 0.3 % (ref 0–0.9)
KETONES UR STRIP.AUTO-MCNC: NEGATIVE MG/DL
LEUKOCYTE ESTERASE UR QL STRIP.AUTO: ABNORMAL
LIPASE SERPL-CCNC: 17 U/L (ref 7–58)
LYMPHOCYTES # BLD AUTO: 2.13 K/UL (ref 1–4.8)
LYMPHOCYTES NFR BLD: 21.7 % (ref 22–41)
MCH RBC QN AUTO: 31.6 PG (ref 27–33)
MCHC RBC AUTO-ENTMCNC: 34.8 G/DL (ref 33.6–35)
MCV RBC AUTO: 90.6 FL (ref 81.4–97.8)
MICRO URNS: ABNORMAL
MONOCYTES # BLD AUTO: 0.63 K/UL (ref 0–0.85)
MONOCYTES NFR BLD AUTO: 6.4 % (ref 0–13.4)
MUCOUS THREADS #/AREA URNS HPF: ABNORMAL /HPF
NEUTROPHILS # BLD AUTO: 6.87 K/UL (ref 2–7.15)
NEUTROPHILS NFR BLD: 70.2 % (ref 44–72)
NITRITE UR QL STRIP.AUTO: POSITIVE
NRBC # BLD AUTO: 0 K/UL
NRBC BLD-RTO: 0 /100 WBC
PH UR STRIP.AUTO: 6.5 [PH]
PLATELET # BLD AUTO: 276 K/UL (ref 164–446)
PMV BLD AUTO: 9.4 FL (ref 9–12.9)
POTASSIUM SERPL-SCNC: 4 MMOL/L (ref 3.6–5.5)
PROT SERPL-MCNC: 6.7 G/DL (ref 6–8.2)
PROT UR QL STRIP: NEGATIVE MG/DL
RBC # BLD AUTO: 4.91 M/UL (ref 4.2–5.4)
RBC # URNS HPF: ABNORMAL /HPF
RBC UR QL AUTO: NEGATIVE
SODIUM SERPL-SCNC: 135 MMOL/L (ref 135–145)
SP GR UR STRIP.AUTO: 1.02
WBC # BLD AUTO: 9.8 K/UL (ref 4.8–10.8)
WBC #/AREA URNS HPF: ABNORMAL /HPF

## 2018-03-20 PROCEDURE — 99285 EMERGENCY DEPT VISIT HI MDM: CPT

## 2018-03-20 PROCEDURE — 80053 COMPREHEN METABOLIC PANEL: CPT

## 2018-03-20 PROCEDURE — 87186 SC STD MICRODIL/AGAR DIL: CPT

## 2018-03-20 PROCEDURE — 83690 ASSAY OF LIPASE: CPT

## 2018-03-20 PROCEDURE — 87086 URINE CULTURE/COLONY COUNT: CPT

## 2018-03-20 PROCEDURE — 81001 URINALYSIS AUTO W/SCOPE: CPT

## 2018-03-20 PROCEDURE — 87077 CULTURE AEROBIC IDENTIFY: CPT

## 2018-03-20 PROCEDURE — 96375 TX/PRO/DX INJ NEW DRUG ADDON: CPT

## 2018-03-20 PROCEDURE — 96374 THER/PROPH/DIAG INJ IV PUSH: CPT

## 2018-03-20 PROCEDURE — 85025 COMPLETE CBC W/AUTO DIFF WBC: CPT

## 2018-03-20 PROCEDURE — 700111 HCHG RX REV CODE 636 W/ 250 OVERRIDE (IP): Performed by: EMERGENCY MEDICINE

## 2018-03-20 RX ORDER — CEFTRIAXONE 2 G/1
2 INJECTION, POWDER, FOR SOLUTION INTRAMUSCULAR; INTRAVENOUS ONCE
Status: COMPLETED | OUTPATIENT
Start: 2018-03-20 | End: 2018-03-20

## 2018-03-20 RX ORDER — MORPHINE SULFATE 4 MG/ML
4 INJECTION, SOLUTION INTRAMUSCULAR; INTRAVENOUS ONCE
Status: COMPLETED | OUTPATIENT
Start: 2018-03-20 | End: 2018-03-20

## 2018-03-20 RX ORDER — ONDANSETRON 2 MG/ML
4 INJECTION INTRAMUSCULAR; INTRAVENOUS
Status: DISCONTINUED | OUTPATIENT
Start: 2018-03-20 | End: 2018-03-20 | Stop reason: HOSPADM

## 2018-03-20 RX ORDER — CETIRIZINE HYDROCHLORIDE 10 MG/1
10 TABLET ORAL
Status: SHIPPED | COMMUNITY
End: 2021-05-05

## 2018-03-20 RX ORDER — CEFDINIR 300 MG/1
300 CAPSULE ORAL 2 TIMES DAILY
Qty: 14 CAP | Refills: 0 | Status: SHIPPED | OUTPATIENT
Start: 2018-03-20 | End: 2018-04-30

## 2018-03-20 RX ADMIN — MORPHINE SULFATE 4 MG: 4 INJECTION INTRAVENOUS at 12:30

## 2018-03-20 RX ADMIN — CEFTRIAXONE SODIUM 2 G: 2 INJECTION, POWDER, FOR SOLUTION INTRAMUSCULAR; INTRAVENOUS at 12:30

## 2018-03-20 RX ADMIN — ONDANSETRON 4 MG: 2 INJECTION INTRAMUSCULAR; INTRAVENOUS at 12:30

## 2018-03-20 ASSESSMENT — PAIN SCALES - GENERAL: PAINLEVEL_OUTOF10: 6

## 2018-03-20 NOTE — ED NOTES
Pt reports right lower flank pain since last night that radiates to right lower back. Reports nausea but denies vomiting. Denies urinary s/sx. Reports dec appetitte and weakness since Friday. Skinpwd. Aa0x3. resp nonlabored. abd tender to palpation in right lower flank

## 2018-03-20 NOTE — ED NOTES
"Pt updated on plan of care. Reports pain \"much much better\" denies any needs at this time. Skinpwd. Aa0x3. resp nonlabored.   "

## 2018-03-20 NOTE — DISCHARGE INSTRUCTIONS
Pyelonephritis  (Urinary Tract Infection Involving the Kidney)    Start antibiotics tomorrow.  Take Tylenol for fever and pain.  Return for uncontrolled vomiting, ill appearance or dizziness with standing.  See your doctor or return if not better in 2 days.     You had a borderline or high normal blood pressure reading today.  This does not necessarily mean you have hypertension.  Please followup with your/a primary physician for comprehensive blood pressure evaluation and yearly fasting cholesterol assessment.  BP Readings from Last 3 Encounters:   03/20/18 (!) 180/82   01/05/18 128/72   12/13/17 160/90         Pyelonephritis is an inflammation (soreness) of the kidney. It is generally caused by infection. It usually affects only one kidney. It may affect both. Usually these kidney infections have spread from the lower urinary tract. Because the urethra (the opening to the bladder) is much shorter in females than in males, urinary tract infections are much more common in females.    SYMPTOMS (what seems to be the problem)  Usually infections of the kidney have an abrupt onset of chills and fever. There is often an ache in the flank (the back near the lower ribs). There is often a generalized malaise. There may be nausea (feeling sick to your stomach) and vomiting. Some times there are symptoms (problems) of cystitis (bladder infection and inflammation). These symptoms often include urgency, increased frequency of urination, and burning with urination.    Pyelonephritis will usually respond to antibiotics (medications that kill bacteria). When this illness is recognized and treated promptly, complications are not common. Hospitalization may be required. If treated at home, take all the medicine given to you until finished. You may feel better in a few days, but TAKE ALL THE MEDICINE or the infection may not respond. It can become more difficult to treat. Response can generally be expected in 7 to 10 days.    Drink  lots of fluid, 3 to 4 quarts a day. Cranberry juice is especially recommended, in addition to large amounts of water. Avoid caffeine, tea and carbonated beverages (Coke®, 7-Up®, etc.), because they tend to irritate the bladder. Males should avoid alcohol as this may irritate the prostate. Aspirin, ibuprofen (Advil® or Motrin®) or acetaminophen (Tylenol®) may be used for temperature and/or discomfort. Only use these if your caregiver has not given medications that interfere.    TO PREVENT FURTHER INFECTIONS:  Ø Empty the bladder often. Avoid holding urine for long periods of time.  Ø After a bowel movement, women should cleanse front to back. Only use each tissue once.  Ø Empty the bladder before and after sexual intercourse.    If you develop an increase of back pain, fever, nausea, vomiting, or your symptoms are no better in three (3) days, seek medical attention. Seek medical attention sooner if you are getting worse.    Document Released: 12/18/2006    alike® Patient Information ©2008 [employer].

## 2018-03-20 NOTE — ED PROVIDER NOTES
ED Provider Note    CHIEF COMPLAINT  Chief Complaint   Patient presents with   • RLQ Pain     Onset at midnight   • Flank Pain       HPI  Kacie Rubalcava is a 77 y.o. female who presents with right lower quadrant abdominal pain radiating to the flank midnight. It is constant in severity is 6 of 10. It is associated with nausea but no vomiting. There is no trigger modifying factor. No fever. Denies dysuria urgency or frequency. No prior pyelonephritis hematuria or kidney stones. Status post appendectomy colitis and colon resection for a benign tumor. No cholecystectomy. No history of diverticulitis. No peripheral vascular disease aneurysm peptic ulcer disease gastritis or pancreatitis.    REVIEW OF SYSTEMS  Pertinent positives include: Flank pain with nausea.  Pertinent negatives include: Vomiting, diarrhea, dysuria, urgency, frequency, diabetes or immune compromise.  10+ systems reviewed and negative.      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/15    generalized   • Degenerative joint disease    • High cholesterol    • HTN (hypertension), benign    • Hyperlipemia    • Hypertension    • Microscopic colitis 2010   • Neck pain on left side 5/31/2017   • Osteoporosis    • Other specified symptom associated with female genital organs     post menopausal bleeding   • Snoring        FAMILY HISTORY  Family History   Problem Relation Age of Onset   • Lung Disease Mother    • Cancer Mother      lung cancer   • Cancer Paternal Aunt      breast       SOCIAL HISTORY  Social History   Substance Use Topics   • Smoking status: Never Smoker   • Smokeless tobacco: Never Used   • Alcohol use 3.6 oz/week     6 Standard drinks or equivalent per week      Comment: 3-4 glasses per week     History   Drug Use No       SURGICAL HISTORY  Past Surgical History:   Procedure Laterality Date   • HYSTEROSCOPY WITH VIDEO DIAGNOSTIC  3/13/2015    Performed by Alexandria Keenan M.D. at  "SURGERY SAME DAY [facility] ORS   • DILATION AND CURETTAGE  3/13/2015    Performed by Alexandria Keenan M.D. at SURGERY SAME DAY [facility] ORS   • BREAST BIOPSY  11/13/08    Performed by ISRAEL HICKS at SURGERY SAME DAY [facility] ORS X3   • COLON RESECTION  2000    polyps   • APPENDECTOMY     • OTHER NEUROLOGICAL SURG     • TONSILLECTOMY     • US-NEEDLE CORE BX-BREAST PANEL         CURRENT MEDICATIONS    Current Outpatient Prescriptions   Medication Sig Dispense Refill   • lovastatin (MEVACOR) 10 MG tablet Take 1 Tab by mouth every bedtime. 90 Tab 3   • lovastatin (MEVACOR) 10 MG tablet Take 1 Tab by mouth every day. TAKE 1 TAB BY MOUTH EVERY DAY. 90 Tab 3   • losartan (COZAAR) 100 MG Tab Take 1 Tab by mouth every day. 90 Tab 4   • Cyanocobalamin (B-12) 1000 MCG Cap Take  by mouth.     • atenolol (TENORMIN) 25 MG Tab Take 1 Tab by mouth every day. 90 Tab 3   • Budesonide (ENTOCORT EC PO) Take 3 Tabs by mouth every morning.     • diphenoxylate-atropine (LOMOTIL) 2.5-0.025 MG Tab Take 1 Tab by mouth 4 times a day as needed for Diarrhea. 30 Tab 1   • Loperamide HCl (IMODIUM PO) Take  by mouth.     • Cholecalciferol (VITAMIN D3) 5000 UNITS CAPS Take 1 Cap by mouth every day.     • Melatonin 5 MG TABS Take 1-2 Tabs by mouth at bedtime as needed.     • Coral Calcium 1000 (390 CA) MG TABS Take 1 Each by mouth every day. 60 Each    • Ascorbic Acid (VITAMIN C) 1000 MG TABS Take 1 Tab by mouth. 60 Each        ALLERGIES  Allergies   Allergen Reactions   • Food Anaphylaxis     mussels   • Sulfa Drugs Anaphylaxis   • Ace Inhibitors Cough   • Nifedipine Swelling     Leg edema       PHYSICAL EXAM  VITAL SIGNS: BP (!) 180/82   Pulse 64   Temp 36.7 °C (98.1 °F)   Resp 16   Ht 1.626 m (5' 4\")   Wt 82.4 kg (181 lb 10.5 oz)   LMP 01/01/2002   SpO2 96%   BMI 31.18 kg/m²   Reviewed and hypertensive  Constitutional: Well developed, Well nourished, well appearing, mildly overweight.  HENT: Normocephalic, atraumatic, bilateral " external ears normal, oropharynx moist, No exudates or erythema.   Eyes: PERRLA, conjunctiva pink, no scleral icterus.   Cardiovascular: Regular S1-S2 without murmur, rub, gallop.  Respiratory: Rales, rhonchi, wheeze.  Gastrointestinal: Right-sided abdominal tenderness which is mild to moderate without rebound or guarding. No distention or masses. Bowel sounds normal..  Skin: No erythema, no rash.   Genitourinary: Mild right costovertebral angle tenderness.   Neurologic: Alert & oriented x 3, cranial nerves 2-12 intact by passive exam.  No focal deficit noted.  Psychiatric: Affect normal, Judgment normal, Mood normal.     DIFFERENTIAL DIAGNOSIS:  Diverticulitis, pyelonephritis, renal colic, doubt mesenteric ischemia, doubt aortoiliac disease, doubt bowel obstruction.      LABORATORY:  Results for orders placed or performed during the hospital encounter of 03/20/18   URINALYSIS,CULTURE IF INDICATED   Result Value Ref Range    Color Yellow     Character Clear     Specific Gravity 1.020 <1.035    Ph 6.5 5.0 - 8.0    Glucose Negative Negative mg/dL    Ketones Negative Negative mg/dL    Protein Negative Negative mg/dL    Bilirubin Negative Negative    Nitrite Positive (A) Negative    Leukocyte Esterase Small (A) Negative    Occult Blood Negative Negative    Micro Urine Req Microscopic     Culture Indicated Yes UA Culture   URINE MICROSCOPIC (W/UA)   Result Value Ref Range    WBC 5-10 (A) /hpf    RBC 0-2 /hpf    Bacteria Many (A) None /hpf    Epithelial Cells Few Few /hpf    Mucous Threads Few /hpf   CBC WITH DIFFERENTIAL   Result Value Ref Range    WBC 9.8 4.8 - 10.8 K/uL    RBC 4.91 4.20 - 5.40 M/uL    Hemoglobin 15.5 12.0 - 16.0 g/dL    Hematocrit 44.5 37.0 - 47.0 %    MCV 90.6 81.4 - 97.8 fL    MCH 31.6 27.0 - 33.0 pg    MCHC 34.8 33.6 - 35.0 g/dL    RDW 41.0 35.9 - 50.0 fL    Platelet Count 276 164 - 446 K/uL    MPV 9.4 9.0 - 12.9 fL    Neutrophils-Polys 70.20 44.00 - 72.00 %    Lymphocytes 21.70 (L) 22.00 - 41.00 %     Monocytes 6.40 0.00 - 13.40 %    Eosinophils 1.00 0.00 - 6.90 %    Basophils 0.40 0.00 - 1.80 %    Immature Granulocytes 0.30 0.00 - 0.90 %    Nucleated RBC 0.00 /100 WBC    Neutrophils (Absolute) 6.87 2.00 - 7.15 K/uL    Lymphs (Absolute) 2.13 1.00 - 4.80 K/uL    Monos (Absolute) 0.63 0.00 - 0.85 K/uL    Eos (Absolute) 0.10 0.00 - 0.51 K/uL    Baso (Absolute) 0.04 0.00 - 0.12 K/uL    Immature Granulocytes (abs) 0.03 0.00 - 0.11 K/uL    NRBC (Absolute) 0.00 K/uL   COMP METABOLIC PANEL   Result Value Ref Range    Sodium 135 135 - 145 mmol/L    Potassium 4.0 3.6 - 5.5 mmol/L    Chloride 104 96 - 112 mmol/L    Co2 25 20 - 33 mmol/L    Anion Gap 6.0 0.0 - 11.9    Glucose 124 (H) 65 - 99 mg/dL    Bun 16 8 - 22 mg/dL    Creatinine 1.10 0.50 - 1.40 mg/dL    Calcium 9.3 8.4 - 10.2 mg/dL    AST(SGOT) 25 12 - 45 U/L    ALT(SGPT) 22 2 - 50 U/L    Alkaline Phosphatase 45 30 - 99 U/L    Total Bilirubin 0.7 0.1 - 1.5 mg/dL    Albumin 3.8 3.2 - 4.9 g/dL    Total Protein 6.7 6.0 - 8.2 g/dL    Globulin 2.9 1.9 - 3.5 g/dL    A-G Ratio 1.3 g/dL   LIPASE   Result Value Ref Range    Lipase 17 7 - 58 U/L       INTERVENTIONS:  Medications   ondansetron (ZOFRAN) syringe/vial injection 4 mg (not administered)   cefTRIAXone (ROCEPHIN) injection 2 g (not administered)   Morphine    Response: Improvement in pain    COURSE & MEDICAL DECISION MAKING  Well-appearing patient presents with right upper quadrant and right flank pain and appears to have pyelonephritis. There is no immunocompromise or evidence of sepsis. She is status post appendectomy. Features are not suggestive of renal colic. At this point there is no indication for imaging. Patient is a good candidate for outpatient trial of therapy.    PLAN:  Discharge Medication List as of 3/20/2018  2:02 PM      START taking these medications    Details   cefdinir (OMNICEF) 300 MG Cap Take 1 Cap by mouth 2 times a day., Disp-14 Cap, R-0, Print Rx Paper           Pyelonephritis handout  given  Return for uncontrolled vomiting, dizziness, severe pain, failure to improve in 2 days, ill appearance    Ally Barroso M.D.  [address] [facility]  [address]  [facility] [id number]  [phone]    Schedule an appointment as soon as possible for a visit in 2 days  As needed if not improving      CONDITION: Stable.    FINAL IMPRESSION  1. Pyelonephritis          Electronically signed by: Ghulam Flanagan, 3/20/2018 12:10 PM

## 2018-03-20 NOTE — ED NOTES
Written and verbal discharge instructions reviewed with pt and family, verbalized understanding. Vital signs WNL. Pt ambulated without difficulty to discharge, pt son will be driving pt home

## 2018-03-22 LAB
BACTERIA UR CULT: ABNORMAL
BACTERIA UR CULT: ABNORMAL
SIGNIFICANT IND 70042: ABNORMAL
SITE SITE: ABNORMAL
SOURCE SOURCE: ABNORMAL

## 2018-03-23 NOTE — ED NOTES
"ED Positive Culture Follow-up/Notification Note:   Date: 3/22/18    Patient seen in the ED on 3/20/2018 for flank pain with nausea and CVA tenderness.   1. Pyelonephritis      Discharge Medication List as of 3/20/2018  2:02 PM      START taking these medications    Details   cefdinir (OMNICEF) 300 MG Cap Take 1 Cap by mouth 2 times a day., Disp-14 Cap, R-0, Print Rx Paper           Allergies: Food; Sulfa drugs; Ace inhibitors; and Nifedipine    Vitals:    03/20/18 1114 03/20/18 1115 03/20/18 1316 03/20/18 1323   BP:  (!) 180/82     Pulse:  64 (!) 50 (!) 49   Resp:  16     Temp:  36.7 °C (98.1 °F)     SpO2:  96% 91% 91%   Weight: 82.4 kg (181 lb 10.5 oz)      Height: 1.626 m (5' 4\")          Final cultures:   Results     Procedure Component Value Units Date/Time    URINE CULTURE(NEW) [[id number]]  (Abnormal)  (Susceptibility) Collected:  03/20/18 1125    Order Status:  Completed Specimen:  Urine Updated:  03/22/18 1002     Significant Indicator POS (POS)     Source UR     Site --     Urine Culture Klebsiella oxytoca  >100,000 cfu/mL   (A)    Culture & Susceptibility     KLEBSIELLA OXYTOCA     Antibiotic Sensitivity Microscan Unit Status    Ampicillin Resistant >16 mcg/mL Final    Cefepime Sensitive <=8 mcg/mL Final    Cefotaxime Sensitive <=2 mcg/mL Final    Cefotetan Sensitive <=16 mcg/mL Final    Ceftazidime Sensitive <=1 mcg/mL Final    Ceftriaxone Sensitive <=8 mcg/mL Final    Cefuroxime Sensitive <=4 mcg/mL Final    Cephalothin Sensitive <=8 mcg/mL Final    Ciprofloxacin Sensitive <=1 mcg/mL Final    Gentamicin Sensitive <=4 mcg/mL Final    Levofloxacin Sensitive <=2 mcg/mL Final    Nitrofurantoin Sensitive <=32 mcg/mL Final    Pip/Tazobactam Sensitive <=16 mcg/mL Final    Piperacillin Sensitive <=16 mcg/mL Final    Tigecycline Sensitive <=2 mcg/mL Final    Tobramycin Sensitive <=4 mcg/mL Final    Trimeth/Sulfa Sensitive <=2/38 mcg/mL Final                       URINALYSIS,CULTURE IF INDICATED [[id number]]  " (Abnormal) Collected:  03/20/18 1125    Order Status:  Completed Specimen:  Urine Updated:  03/20/18 1220     Color Yellow     Character Clear     Specific Gravity 1.020     Ph 6.5     Glucose Negative mg/dL      Ketones Negative mg/dL      Protein Negative mg/dL      Bilirubin Negative     Nitrite Positive (A)     Leukocyte Esterase Small (A)     Occult Blood Negative     Micro Urine Req Microscopic     Culture Indicated Yes UA Culture     URINE CULTURE(NEW) [[id number]] Collected:  03/20/18 0000    Order Status:  Canceled Specimen:  Urine           Plan:   Klebsiella oxytoca isolated from urine culture.  Change in therapy based on bioavailability of prescribed drug.  Discussed culture result and change in antibiotics with patient, who will  new prescription at [facility] on [address].  New antibiotic dose adjusted based on available estimated renal function.     New Rx:  Ciprofloxacin 500mg PO daily x 7 days #7, no refills - MD: Aminta per protocol    Peng Choudhary, PharmD

## 2018-04-26 ENCOUNTER — APPOINTMENT (OUTPATIENT)
Dept: DERMATOLOGY | Facility: IMAGING CENTER | Age: 78
End: 2018-04-26

## 2018-04-28 ENCOUNTER — OFFICE VISIT (OUTPATIENT)
Dept: URGENT CARE | Facility: CLINIC | Age: 78
End: 2018-04-28
Payer: MEDICARE

## 2018-04-28 VITALS
RESPIRATION RATE: 16 BRPM | BODY MASS INDEX: 30.86 KG/M2 | WEIGHT: 180.78 LBS | HEART RATE: 84 BPM | TEMPERATURE: 97.6 F | HEIGHT: 64 IN | SYSTOLIC BLOOD PRESSURE: 118 MMHG | OXYGEN SATURATION: 97 % | DIASTOLIC BLOOD PRESSURE: 80 MMHG

## 2018-04-28 DIAGNOSIS — J32.9 OTHER SINUSITIS, UNSPECIFIED CHRONICITY: ICD-10-CM

## 2018-04-28 PROCEDURE — 99214 OFFICE O/P EST MOD 30 MIN: CPT | Performed by: PHYSICIAN ASSISTANT

## 2018-04-28 RX ORDER — CEFUROXIME AXETIL 500 MG/1
500 TABLET ORAL 2 TIMES DAILY
Qty: 20 TAB | Refills: 0 | Status: SHIPPED | OUTPATIENT
Start: 2018-04-28 | End: 2018-05-08

## 2018-04-28 ASSESSMENT — ENCOUNTER SYMPTOMS
RESPIRATORY NEGATIVE: 1
EYES NEGATIVE: 1
SINUS PAIN: 1
CARDIOVASCULAR NEGATIVE: 1
SORE THROAT: 0
HEADACHES: 1
CONSTITUTIONAL NEGATIVE: 1
SINUS PRESSURE: 1
COUGH: 0

## 2018-04-28 NOTE — PROGRESS NOTES
"Subjective:      Kacie Rubalcava is a 77 y.o. female who presents with Sinus Problem (x 4 days, pain on Rt. side of face, little runny nose, watery Rt. eye, Rt. ear pain, post nasal drip and pain on Rt. side of throat)            Sinus Problem   This is a new problem. The current episode started in the past 7 days. The problem is unchanged. There has been no fever. The pain is moderate. Associated symptoms include congestion, headaches and sinus pressure. Pertinent negatives include no coughing or sore throat. Past treatments include nothing. The treatment provided no relief.       Review of Systems   Constitutional: Negative.    HENT: Positive for congestion, sinus pain and sinus pressure. Negative for sore throat.    Eyes: Negative.    Respiratory: Negative.  Negative for cough.    Cardiovascular: Negative.    Skin: Negative.    Neurological: Positive for headaches.          Objective:     /80   Pulse 84   Temp 36.4 °C (97.6 °F)   Resp 16   Ht 1.626 m (5' 4\")   Wt 82 kg (180 lb 12.4 oz)   LMP 01/01/2002   SpO2 97%   BMI 31.03 kg/m²      Physical Exam   Constitutional: She is oriented to person, place, and time. She appears well-developed and well-nourished. No distress.   HENT:   Head: Normocephalic and atraumatic.   Mouth/Throat: Oropharynx is clear and moist.   +maxill.sinus press.     Eyes: EOM are normal. Pupils are equal, round, and reactive to light.   Neck: Normal range of motion. Neck supple.   Cardiovascular: Normal rate.    Pulmonary/Chest: Effort normal and breath sounds normal. No respiratory distress.   Lymphadenopathy:     She has no cervical adenopathy.   Neurological: She is alert and oriented to person, place, and time.   Skin: Skin is warm and dry.   Psychiatric: She has a normal mood and affect. Her behavior is normal.   Nursing note and vitals reviewed.    Active Ambulatory Problems     Diagnosis Date Noted   • Degenerative joint disease    • Dupuytren's contracture of both " hands 05/22/2013   • Vitamin D deficiency disease 11/13/2013   • Environmental allergies 04/18/2014   • Eczema 10/14/2014   • Abnormal biliary HIDA scan 10/14/2014   • Osteoporosis, post-menopausal 10/14/2014   • Sinus bradycardia 03/16/2015   • Obesity (BMI 30.0-34.9) 04/09/2015   • Mixed hyperlipidemia with apolipoprotein E2 variant 04/09/2015   • Insulin resistance 04/09/2015   • Metabolic syndrome 04/09/2015   • Essential hypertension 12/01/2015   • Microscopic colitis 09/05/2017     Resolved Ambulatory Problems     Diagnosis Date Noted   • HTN (hypertension), benign    • Osteoporosis    • Hyperlipemia    • Overweight 05/22/2013   • Rash 05/22/2013   • Breast cancer screening 11/13/2013   • HTN (hypertension) 11/13/2013   • Dry skin dermatitis 11/13/2013   • GI symptoms 11/13/2013   • Abnormal ultrasound of kidney 11/22/2013   • Eustachian tube dysfunction 04/18/2014   • Need for influenza vaccination 10/07/2014   • GI problem 02/27/2015   • PMB (postmenopausal bleeding) 02/27/2015   • Abnormal pelvic ultrasound 02/27/2015   • Postmenopausal bleeding 03/13/2015   • Overweight (BMI 25.0-29.9) 03/16/2015   • Inflammation of arteries ([facility]) 04/09/2015   • Elevated homocysteine (CMS-HCC) 04/09/2015   • Neck pain on left side 05/31/2017     Past Medical History:   Diagnosis Date   • Anxiety    • Arthritis 03/12/15   • Degenerative joint disease    • High cholesterol    • HTN (hypertension), benign    • Hyperlipemia    • Hypertension    • Microscopic colitis 2010   • Neck pain on left side 5/31/2017   • Osteoporosis    • Other specified symptom associated with female genital organs    • Snoring      Current Outpatient Prescriptions on File Prior to Visit   Medication Sig Dispense Refill   • lovastatin (MEVACOR) 10 MG tablet Take 1 Tab by mouth every day. TAKE 1 TAB BY MOUTH EVERY DAY. 90 Tab 3   • losartan (COZAAR) 100 MG Tab Take 1 Tab by mouth every day. 90 Tab 4   • Cyanocobalamin (B-12) 1000 MCG Cap Take 1 Tab by  mouth every day.     • atenolol (TENORMIN) 25 MG Tab Take 1 Tab by mouth every day. 90 Tab 3   • Loperamide HCl (IMODIUM PO) Take 1 Tab by mouth as needed (For diarrhea).     • Cholecalciferol (VITAMIN D3) 5000 UNITS CAPS Take 1 Cap by mouth every day.     • Melatonin 5 MG TABS Take 1 Tab by mouth at bedtime as needed.     • Ascorbic Acid (VITAMIN C) 1000 MG TABS Take 1 Tab by mouth. 60 Each    • cetirizine (KLS ALLER-AMERICO) 10 MG Tab Take 10 mg by mouth every bedtime.     • cefdinir (OMNICEF) 300 MG Cap Take 1 Cap by mouth 2 times a day. 14 Cap 0   • Coral Calcium 1000 (390 CA) MG TABS Take 1 Each by mouth every day. 60 Each      No current facility-administered medications on file prior to visit.      Social History     Social History   • Marital status: Single     Spouse name: N/A   • Number of children: N/A   • Years of education: N/A     Occupational History   • Not on file.     Social History Main Topics   • Smoking status: Never Smoker   • Smokeless tobacco: Never Used   • Alcohol use 3.6 oz/week     6 Standard drinks or equivalent per week      Comment: 3-4 glasses per week   • Drug use: No   • Sexual activity: Yes     Partners: Male     Other Topics Concern   • Not on file     Social History Narrative   • No narrative on file     Family History   Problem Relation Age of Onset   • Lung Disease Mother    • Cancer Mother      lung cancer   • Cancer Paternal Aunt      breast     Food; Sulfa drugs; Ace inhibitors; and Nifedipine              Assessment/Plan:     ·  sinusitis      · Start w/MucinexD; nsaids; [hold rx for abx prn [conditional use] if sx persist/worsen]  ·

## 2018-04-30 ENCOUNTER — OFFICE VISIT (OUTPATIENT)
Dept: INTERNAL MEDICINE | Facility: IMAGING CENTER | Age: 78
End: 2018-04-30
Payer: MEDICARE

## 2018-04-30 VITALS
SYSTOLIC BLOOD PRESSURE: 120 MMHG | DIASTOLIC BLOOD PRESSURE: 78 MMHG | RESPIRATION RATE: 14 BRPM | HEART RATE: 64 BPM | HEIGHT: 64 IN | TEMPERATURE: 98.8 F | OXYGEN SATURATION: 94 % | WEIGHT: 178 LBS | BODY MASS INDEX: 30.39 KG/M2

## 2018-04-30 DIAGNOSIS — H66.001 ACUTE SUPPURATIVE OTITIS MEDIA OF RIGHT EAR WITHOUT SPONTANEOUS RUPTURE OF TYMPANIC MEMBRANE, RECURRENCE NOT SPECIFIED: ICD-10-CM

## 2018-04-30 PROCEDURE — 99214 OFFICE O/P EST MOD 30 MIN: CPT | Performed by: FAMILY MEDICINE

## 2018-04-30 RX ORDER — AMOXICILLIN AND CLAVULANATE POTASSIUM 875; 125 MG/1; MG/1
1 TABLET, FILM COATED ORAL 2 TIMES DAILY
Qty: 14 TAB | Refills: 0 | Status: SHIPPED | OUTPATIENT
Start: 2018-04-30 | End: 2018-05-07

## 2018-04-30 NOTE — PROGRESS NOTES
"Chief Complaint   Patient presents with   • Facial Pain       HPI:  Patient is a 77 y.o. female established patient who presents today for evaluation of new right sided facial pain, watery right eye, ear fullness, and fatigue present since last Thursday. She was seen at  Saturday and mucinex was recommended (pt upset that ears were not examined at that visit despite ear pain complaints). She was also provided with ceftin RX to start if symptoms worsened. She started ceftin RX on Saturday/ has not used mucinex yesterday or today and reports only feeling 10% better at this time. She denies associated cough/sputum/nasal discharge/N/V/D/F/bowel or bladder changes but still feels poorly. She reports that her chronic medical conditions are otherwise stable.     Patient Active Problem List    Diagnosis Date Noted   • Microscopic colitis 09/05/2017   • Essential hypertension 12/01/2015   • Obesity (BMI 30.0-34.9) 04/09/2015   • Mixed hyperlipidemia with apolipoprotein E2 variant 04/09/2015   • Insulin resistance 04/09/2015   • Metabolic syndrome 04/09/2015   • Sinus bradycardia 03/16/2015   • Eczema 10/14/2014   • Abnormal biliary HIDA scan 10/14/2014   • Osteoporosis, post-menopausal 10/14/2014   • Environmental allergies 04/18/2014   • Vitamin D deficiency disease 11/13/2013   • Dupuytren's contracture of both hands 05/22/2013   • Degenerative joint disease      Past medical, surgical, family, and social history was reviewed in Epic chart by me today.     Medications and allergies reviewed and updated in Epic chart by me today.     ROS:  Pertinent positives listed above in HPI. All other systems have been reviewed and are negative.    PE:   /78   Pulse 64   Temp 37.1 °C (98.8 °F)   Resp 14   Ht 1.626 m (5' 4.02\")   Wt 80.7 kg (178 lb)   LMP 01/01/2002   SpO2 94%   BMI 30.54 kg/m²   Vital signs reviewed with patient.     Gen: Well developed; well nourished; no acute distress; age appropriate appearance "   HEENT: Normocephalic; atraumatic; PEERLA b/l; sclera clear b/l; b/l external auditory canals WNL; R inner canal is red and TM is bulging; L TM WNL; nares patent; oropharynx clear; oral mucosa moist; tongue midline; dentition adequate; pt has R sided sinus tenderness with percussion  Neck: Mild R anterior chain adenopathy; no thyromegaly  CV: Regular rate and rhythm; S1/ S2 present; no murmur, gallop or rub noted  Pulm: No respiratory distress; clear to ascultation b/l; no wheezing or stridor noted b/l  Abd: Adequate bowel sounds noted; soft and nontender; no rebound, rigidity, nor distention  Extremities: No peripheral edema b/l LE extremities/ no clubbing nor cyanosis noted  Skin: Warm and dry; no rashes noted   Neuro: No focal deficits noted   Psych: AAOx4; mood and affect are appropriate    A/P:  1. Acute suppurative otitis media of right ear without spontaneous rupture of tympanic membrane, recurrence not specified  Recommend patient switch from ceftin to augmentin today (take with food)/ use flonase BID/ mucinex BID if she has secretions/ rest/ hydrate and follow response.   - amoxicillin-clavulanate (AUGMENTIN) 875-125 MG Tab; Take 1 Tab by mouth 2 times a day for 7 days.  Dispense: 14 Tab; Refill: 0

## 2018-05-08 DIAGNOSIS — J06.9 UPPER RESPIRATORY TRACT INFECTION, UNSPECIFIED TYPE: ICD-10-CM

## 2018-05-08 RX ORDER — DOXYCYCLINE HYCLATE 100 MG
100 TABLET ORAL 2 TIMES DAILY
Qty: 14 TAB | Refills: 0 | Status: SHIPPED | OUTPATIENT
Start: 2018-05-08 | End: 2018-05-15

## 2018-05-11 ENCOUNTER — OFFICE VISIT (OUTPATIENT)
Dept: INTERNAL MEDICINE | Facility: IMAGING CENTER | Age: 78
End: 2018-05-11
Payer: MEDICARE

## 2018-05-11 VITALS
BODY MASS INDEX: 30.39 KG/M2 | DIASTOLIC BLOOD PRESSURE: 90 MMHG | TEMPERATURE: 98.2 F | HEART RATE: 58 BPM | RESPIRATION RATE: 14 BRPM | WEIGHT: 178 LBS | HEIGHT: 64 IN | SYSTOLIC BLOOD PRESSURE: 160 MMHG | OXYGEN SATURATION: 96 %

## 2018-05-11 DIAGNOSIS — J01.80 ACUTE NON-RECURRENT SINUSITIS OF OTHER SINUS: ICD-10-CM

## 2018-05-11 PROCEDURE — 96372 THER/PROPH/DIAG INJ SC/IM: CPT | Performed by: FAMILY MEDICINE

## 2018-05-11 PROCEDURE — 99213 OFFICE O/P EST LOW 20 MIN: CPT | Mod: 25 | Performed by: FAMILY MEDICINE

## 2018-05-11 RX ORDER — TRIAMCINOLONE ACETONIDE 40 MG/ML
40 INJECTION, SUSPENSION INTRA-ARTICULAR; INTRAMUSCULAR ONCE
Status: COMPLETED | OUTPATIENT
Start: 2018-05-11 | End: 2018-05-11

## 2018-05-11 RX ADMIN — TRIAMCINOLONE ACETONIDE 40 MG: 40 INJECTION, SUSPENSION INTRA-ARTICULAR; INTRAMUSCULAR at 11:05

## 2018-05-11 NOTE — PROGRESS NOTES
Chief Complaint   Patient presents with   • Otalgia   • Sinusitis       HPI:  Patient is a 77 y.o. female established patient who presents today for evaluation of ongoing sinus and ear pain issues present since she was originally seen at  on 4/28. She then saw me 4/30 and I noted a right otitis media and changed antibiotic from ceftin to augmentin. She did not tolerate augmentin well due to vomiting and ended up completing ceftin prescription. She then emailed me on 5/8 and felt that she was not at all improved with her symptomatology. She started taking doxycycline Tuesday and continues to tolerate it well. She is complaining of right ear pain that shoots into her right jaw area as well as general head tenderness with palpation. She denies associated cough/ sputum production/N/V/D/F/bowel or bladder changes and denies new skin rash or lesions on head/neck areas. She has also been using flonase and mucinex BID and is frustrated about her prolonged illness.     Patient Active Problem List    Diagnosis Date Noted   • Microscopic colitis 09/05/2017   • Essential hypertension 12/01/2015   • Obesity (BMI 30.0-34.9) 04/09/2015   • Mixed hyperlipidemia with apolipoprotein E2 variant 04/09/2015   • Insulin resistance 04/09/2015   • Metabolic syndrome 04/09/2015   • Sinus bradycardia 03/16/2015   • Eczema 10/14/2014   • Abnormal biliary HIDA scan 10/14/2014   • Osteoporosis, post-menopausal 10/14/2014   • Environmental allergies 04/18/2014   • Vitamin D deficiency disease 11/13/2013   • Dupuytren's contracture of both hands 05/22/2013   • Degenerative joint disease      Past medical, surgical, family, and social history was reviewed in Epic chart by me today.     Medications and allergies reviewed and updated in Epic chart by me today.     ROS:  Pertinent positives listed above in HPI. All other systems have been reviewed and are negative.    PE:   /90   Pulse (!) 58   Temp 36.8 °C (98.2 °F)   Resp 14   Ht 1.626  "miguel (5' 4.02\")   Wt 80.7 kg (178 lb)   LMP 01/01/2002   SpO2 96%   BMI 30.54 kg/m²   Vital signs reviewed with patient.     Gen: Well developed; well nourished; no acute distress; non toxic appearance   HEENT: Normocephalic; atraumatic; PEERLA b/l; sclera clear b/l; b/l external auditory canals WNL; b/l TM WNL; nares patent; oropharynx clear; oral mucosa moist; tongue midline; dentition adequate; no pain with sinus percussion b/l; no pain over temporal artery b/l; no evidence of enlarged parotid gland; no skin lesions or rash noted with extensive examination of face and scalp (used tongue blade to divide hair)   Neck: No adenopathy; no thyromegaly  CV: Regular rate and rhythm; S1/ S2 present; no murmur, gallop or rub noted  Pulm: No respiratory distress; clear to ascultation b/l; no wheezing or stridor noted b/l  Abd: Adequate bowel sounds noted; soft and nontender; no rebound, rigidity, nor distention  Extremities: No peripheral edema b/l LE extremities/ no clubbing nor cyanosis noted  Skin: Warm and dry; no rashes noted;  Neuro: No focal deficits noted   Psych: AAOx4; mood and affect are appropriate    A/P:  1. Acute non-recurrent sinusitis of other sinus  Today pt's physical exam has improved since her prior visit. Recommend that she finish current doxycycline prescription, continue mucinex BID, continue flonase BID, rest and maintain hydration. Will giver her kenalog injection today to help with general inflammation of sinuses which is likely source of her current complaints. No evidence of temporal arteritis, parotitis, or zoster at this time.   - triamcinolone acetonide (KENALOG-40) injection 40 mg; 1 mL by Intramuscular route Once.      Pt is to contact our office as needed if condition does not improve with treatment plans detailed above.    "

## 2018-05-16 ENCOUNTER — APPOINTMENT (OUTPATIENT)
Dept: OTHER | Facility: IMAGING CENTER | Age: 78
End: 2018-05-16

## 2018-05-20 DIAGNOSIS — J32.9 RECURRENT SINUSITIS: ICD-10-CM

## 2018-05-22 DIAGNOSIS — J01.41 ACUTE RECURRENT PANSINUSITIS: ICD-10-CM

## 2018-05-25 ENCOUNTER — APPOINTMENT (OUTPATIENT)
Dept: DERMATOLOGY | Facility: IMAGING CENTER | Age: 78
End: 2018-05-25

## 2018-06-06 ENCOUNTER — HOSPITAL ENCOUNTER (OUTPATIENT)
Dept: RADIOLOGY | Facility: MEDICAL CENTER | Age: 78
End: 2018-06-06
Attending: FAMILY MEDICINE
Payer: MEDICARE

## 2018-06-06 DIAGNOSIS — J01.41 ACUTE RECURRENT PANSINUSITIS: ICD-10-CM

## 2018-06-06 PROCEDURE — 70486 CT MAXILLOFACIAL W/O DYE: CPT

## 2018-08-01 ENCOUNTER — HOSPITAL ENCOUNTER (OUTPATIENT)
Facility: MEDICAL CENTER | Age: 78
End: 2018-08-01
Attending: FAMILY MEDICINE
Payer: MEDICARE

## 2018-08-01 ENCOUNTER — OFFICE VISIT (OUTPATIENT)
Dept: INTERNAL MEDICINE | Facility: IMAGING CENTER | Age: 78
End: 2018-08-01
Payer: MEDICARE

## 2018-08-01 VITALS
RESPIRATION RATE: 14 BRPM | DIASTOLIC BLOOD PRESSURE: 62 MMHG | OXYGEN SATURATION: 94 % | WEIGHT: 174 LBS | HEART RATE: 62 BPM | SYSTOLIC BLOOD PRESSURE: 114 MMHG | BODY MASS INDEX: 29.71 KG/M2 | HEIGHT: 64 IN | TEMPERATURE: 97.7 F

## 2018-08-01 DIAGNOSIS — R53.83 FATIGUE, UNSPECIFIED TYPE: ICD-10-CM

## 2018-08-01 LAB
25(OH)D3 SERPL-MCNC: 35 NG/ML (ref 30–100)
ALBUMIN SERPL BCP-MCNC: 4.3 G/DL (ref 3.2–4.9)
ALBUMIN/GLOB SERPL: 1.6 G/DL
ALP SERPL-CCNC: 48 U/L (ref 30–99)
ALT SERPL-CCNC: 20 U/L (ref 2–50)
ANION GAP SERPL CALC-SCNC: 9 MMOL/L (ref 0–11.9)
AST SERPL-CCNC: 24 U/L (ref 12–45)
BASOPHILS # BLD AUTO: 0.4 % (ref 0–1.8)
BASOPHILS # BLD: 0.03 K/UL (ref 0–0.12)
BILIRUB SERPL-MCNC: 0.6 MG/DL (ref 0.1–1.5)
BUN SERPL-MCNC: 27 MG/DL (ref 8–22)
CALCIUM SERPL-MCNC: 10.1 MG/DL (ref 8.5–10.5)
CHLORIDE SERPL-SCNC: 101 MMOL/L (ref 96–112)
CO2 SERPL-SCNC: 26 MMOL/L (ref 20–33)
CREAT SERPL-MCNC: 1.37 MG/DL (ref 0.5–1.4)
EOSINOPHIL # BLD AUTO: 0.21 K/UL (ref 0–0.51)
EOSINOPHIL NFR BLD: 2.5 % (ref 0–6.9)
ERYTHROCYTE [DISTWIDTH] IN BLOOD BY AUTOMATED COUNT: 47.9 FL (ref 35.9–50)
FOLATE SERPL-MCNC: 7.7 NG/ML
GLOBULIN SER CALC-MCNC: 2.7 G/DL (ref 1.9–3.5)
GLUCOSE SERPL-MCNC: 124 MG/DL (ref 65–99)
HCT VFR BLD AUTO: 43.8 % (ref 37–47)
HGB BLD-MCNC: 14.4 G/DL (ref 12–16)
IMM GRANULOCYTES # BLD AUTO: 0.03 K/UL (ref 0–0.11)
IMM GRANULOCYTES NFR BLD AUTO: 0.4 % (ref 0–0.9)
LYMPHOCYTES # BLD AUTO: 2.54 K/UL (ref 1–4.8)
LYMPHOCYTES NFR BLD: 30.8 % (ref 22–41)
MCH RBC QN AUTO: 31 PG (ref 27–33)
MCHC RBC AUTO-ENTMCNC: 32.9 G/DL (ref 33.6–35)
MCV RBC AUTO: 94.2 FL (ref 81.4–97.8)
MONOCYTES # BLD AUTO: 0.6 K/UL (ref 0–0.85)
MONOCYTES NFR BLD AUTO: 7.3 % (ref 0–13.4)
NEUTROPHILS # BLD AUTO: 4.83 K/UL (ref 2–7.15)
NEUTROPHILS NFR BLD: 58.6 % (ref 44–72)
NRBC # BLD AUTO: 0 K/UL
NRBC BLD-RTO: 0 /100 WBC
PLATELET # BLD AUTO: 319 K/UL (ref 164–446)
PMV BLD AUTO: 10.1 FL (ref 9–12.9)
POTASSIUM SERPL-SCNC: 3.8 MMOL/L (ref 3.6–5.5)
PROT SERPL-MCNC: 7 G/DL (ref 6–8.2)
RBC # BLD AUTO: 4.65 M/UL (ref 4.2–5.4)
SODIUM SERPL-SCNC: 136 MMOL/L (ref 135–145)
T3FREE SERPL-MCNC: 2.94 PG/ML (ref 2.4–4.2)
T4 FREE SERPL-MCNC: 1.18 NG/DL (ref 0.53–1.43)
TSH SERPL DL<=0.005 MIU/L-ACNC: 2 UIU/ML (ref 0.38–5.33)
VIT B12 SERPL-MCNC: 627 PG/ML (ref 211–911)
WBC # BLD AUTO: 8.2 K/UL (ref 4.8–10.8)

## 2018-08-01 PROCEDURE — 99213 OFFICE O/P EST LOW 20 MIN: CPT | Performed by: FAMILY MEDICINE

## 2018-08-01 PROCEDURE — 82746 ASSAY OF FOLIC ACID SERUM: CPT

## 2018-08-01 PROCEDURE — 82306 VITAMIN D 25 HYDROXY: CPT | Mod: GZ

## 2018-08-01 PROCEDURE — 80053 COMPREHEN METABOLIC PANEL: CPT

## 2018-08-01 PROCEDURE — 84443 ASSAY THYROID STIM HORMONE: CPT

## 2018-08-01 PROCEDURE — 82607 VITAMIN B-12: CPT

## 2018-08-01 PROCEDURE — 84481 FREE ASSAY (FT-3): CPT

## 2018-08-01 PROCEDURE — 84439 ASSAY OF FREE THYROXINE: CPT

## 2018-08-01 PROCEDURE — 85025 COMPLETE CBC W/AUTO DIFF WBC: CPT

## 2018-08-02 NOTE — PROGRESS NOTES
"Chief Complaint   Patient presents with   • Fatigue     over summer months       HPI:  Patient is a 77 y.o. female established patient who presents today for evaluation of new non specific fatigue complaints present over the summer months. She reports never regaining her full energy level back since having sinus infections requiring abx use in April and May 2018. She feels that she could nap daily and recently has had episodes where she felt \"faint\" but did not pass out. She has chronic essential HTN as well as chronic sinus bradycardia with daily medication use but denies ability to check BP at home (states that cuffs are unreliable). She reports drinking water throughout the day and does not feel that there have been any major changes in her routine/ no new exposures/ no long haul travel recently. Last Saturday she was leaning over with her head down washing her dog when she felt \"faint\". She called her  and she sat down - had episode of emesis, sweats, and tingling of her toes that resolved within minutes with no residual symptoms. She denies associated headache, heart palpitations, chest pain, shortness of breath, new peripheral edema, GI distress or systemic illness. She is well today and denies other changes in her health conditions.     Patient Active Problem List    Diagnosis Date Noted   • Microscopic colitis 09/05/2017   • Essential hypertension 12/01/2015   • Obesity (BMI 30.0-34.9) 04/09/2015   • Mixed hyperlipidemia with apolipoprotein E2 variant 04/09/2015   • Insulin resistance 04/09/2015   • Metabolic syndrome 04/09/2015   • Sinus bradycardia 03/16/2015   • Eczema 10/14/2014   • Abnormal biliary HIDA scan 10/14/2014   • Osteoporosis, post-menopausal 10/14/2014   • Environmental allergies 04/18/2014   • Vitamin D deficiency disease 11/13/2013   • Dupuytren's contracture of both hands 05/22/2013   • Degenerative joint disease      Past medical, surgical, family, and social history was reviewed " "in Epic chart by me today.     Medications and allergies reviewed and updated in Epic chart by me today.     ROS:  Pertinent positives listed above in HPI. All other systems have been reviewed and are negative.    PE:   /62   Pulse 62   Temp 36.5 °C (97.7 °F)   Resp 14   Ht 1.626 m (5' 4.02\")   Wt 78.9 kg (174 lb)   LMP 01/01/2002   SpO2 94%   BMI 29.85 kg/m²   Vital signs reviewed with patient.     Gen: Well developed; well nourished; no acute distress; non toxic appearance   CV: Regular rate and rhythm; S1/ S2 present; no murmur, gallop or rub noted  Pulm: No respiratory distress; clear to ascultation b/l; no wheezing or stridor noted b/l  Abd: Adequate bowel sounds noted; soft and nontender; no rebound, rigidity, nor distention   Extremities: No new peripheral edema b/l LE extremities (lower legs are obese at ankles)/ no clubbing nor cyanosis noted  Skin: Warm and dry; no rashes noted   Neuro: No focal deficits noted; gait is steady  Psych: AAOx4; mood and affect are appropriate    A/P:  1. Fatigue, unspecified type  I am concerned that patient may have dehydration coupled with low blood pressure/ low heart rate as  of these episodes. Encouraged patient to find BP cuff that works for her, even on lower leg, and monitor BP regularly. Will also check labs to ensure there is not a metabolic component to her concerns. I also brought up that it can take months to full regain prior energy levels from illness as we age. Exam is WNL today and pt looks well.   - COMP METABOLIC PANEL; Future  - VITAMIN B12; Future  - FOLATE; Future  - TSH; Future  - T3 FREE; Future  - FREE THYROXINE; Future  - VITAMIN D,25 HYDROXY; Future  - CBC WITH DIFFERENTIAL; Future    I will be in touch with patient regarding lab results when available for further care planning, and she is to call our office if further events occur. She will obtain Shingrix vaccinations at pharmacy per Medicare guidelines.           "

## 2018-08-03 ENCOUNTER — APPOINTMENT (OUTPATIENT)
Dept: DERMATOLOGY | Facility: IMAGING CENTER | Age: 78
End: 2018-08-03

## 2018-08-07 ENCOUNTER — HOSPITAL ENCOUNTER (OUTPATIENT)
Facility: MEDICAL CENTER | Age: 78
End: 2018-08-07
Attending: FAMILY MEDICINE
Payer: MEDICARE

## 2018-08-07 ENCOUNTER — NON-PROVIDER VISIT (OUTPATIENT)
Dept: INTERNAL MEDICINE | Facility: IMAGING CENTER | Age: 78
End: 2018-08-07
Payer: COMMERCIAL

## 2018-08-07 DIAGNOSIS — R79.9 ABNORMAL BLOOD CHEMISTRY LEVEL: ICD-10-CM

## 2018-08-07 DIAGNOSIS — R94.4 DECREASED GFR: ICD-10-CM

## 2018-08-07 LAB
CHLORIDE UR-SCNC: 31 MMOL/L
CREAT UR-MCNC: 230.7 MG/DL
MICROALBUMIN UR-MCNC: <0.7 MG/DL
MICROALBUMIN/CREAT UR: NORMAL MG/G (ref 0–30)
POTASSIUM UR-SCNC: 102.1 MMOL/L
SODIUM UR-SCNC: 31 MMOL/L

## 2018-08-07 PROCEDURE — 82570 ASSAY OF URINE CREATININE: CPT

## 2018-08-07 PROCEDURE — 84300 ASSAY OF URINE SODIUM: CPT

## 2018-08-07 PROCEDURE — 84133 ASSAY OF URINE POTASSIUM: CPT

## 2018-08-07 PROCEDURE — 82043 UR ALBUMIN QUANTITATIVE: CPT

## 2018-08-07 PROCEDURE — 82436 ASSAY OF URINE CHLORIDE: CPT

## 2018-08-07 PROCEDURE — 81001 URINALYSIS AUTO W/SCOPE: CPT

## 2018-08-08 LAB
APPEARANCE UR: ABNORMAL
BACTERIA #/AREA URNS HPF: NEGATIVE /HPF
BILIRUB UR QL STRIP.AUTO: NEGATIVE
COLOR UR: ABNORMAL
EPI CELLS #/AREA URNS HPF: NEGATIVE /HPF
GLUCOSE UR STRIP.AUTO-MCNC: NEGATIVE MG/DL
HYALINE CASTS #/AREA URNS LPF: NORMAL /LPF
KETONES UR STRIP.AUTO-MCNC: NEGATIVE MG/DL
LEUKOCYTE ESTERASE UR QL STRIP.AUTO: ABNORMAL
MICRO URNS: ABNORMAL
NITRITE UR QL STRIP.AUTO: POSITIVE
PH UR STRIP.AUTO: 5 [PH]
PROT UR QL STRIP: NEGATIVE MG/DL
RBC # URNS HPF: NORMAL /HPF
RBC UR QL AUTO: NEGATIVE
SP GR UR STRIP.AUTO: 1.02
UROBILINOGEN UR STRIP.AUTO-MCNC: 0.2 MG/DL
WBC #/AREA URNS HPF: NORMAL /HPF

## 2018-08-10 DIAGNOSIS — N30.00 ACUTE CYSTITIS WITHOUT HEMATURIA: ICD-10-CM

## 2018-08-10 RX ORDER — CEFDINIR 300 MG/1
300 CAPSULE ORAL 2 TIMES DAILY
Qty: 10 CAP | Refills: 0 | Status: SHIPPED | OUTPATIENT
Start: 2018-08-10 | End: 2018-08-15

## 2018-08-30 ENCOUNTER — APPOINTMENT (OUTPATIENT)
Dept: DERMATOLOGY | Facility: IMAGING CENTER | Age: 78
End: 2018-08-30

## 2018-09-11 ENCOUNTER — APPOINTMENT (OUTPATIENT)
Dept: DERMATOLOGY | Facility: IMAGING CENTER | Age: 78
End: 2018-09-11

## 2018-10-05 ENCOUNTER — NON-PROVIDER VISIT (OUTPATIENT)
Dept: INTERNAL MEDICINE | Facility: IMAGING CENTER | Age: 78
End: 2018-10-05
Payer: MEDICARE

## 2018-10-05 DIAGNOSIS — Z23 NEED FOR INFLUENZA VACCINATION: ICD-10-CM

## 2018-10-05 PROCEDURE — 90662 IIV NO PRSV INCREASED AG IM: CPT | Performed by: FAMILY MEDICINE

## 2018-10-05 PROCEDURE — G0008 ADMIN INFLUENZA VIRUS VAC: HCPCS | Performed by: FAMILY MEDICINE

## 2018-10-15 RX ORDER — ATENOLOL 25 MG/1
25 TABLET ORAL
Qty: 90 TAB | Refills: 3 | Status: SHIPPED | OUTPATIENT
Start: 2018-10-15 | End: 2019-10-27 | Stop reason: SDUPTHER

## 2018-10-16 ENCOUNTER — OFFICE VISIT (OUTPATIENT)
Dept: DERMATOLOGY | Facility: IMAGING CENTER | Age: 78
End: 2018-10-16
Payer: MEDICARE

## 2018-10-16 VITALS
DIASTOLIC BLOOD PRESSURE: 70 MMHG | SYSTOLIC BLOOD PRESSURE: 110 MMHG | TEMPERATURE: 96.8 F | WEIGHT: 199 LBS | HEART RATE: 72 BPM | BODY MASS INDEX: 33.97 KG/M2 | HEIGHT: 64 IN

## 2018-10-16 DIAGNOSIS — Q82.5 NEVUS FLAMMEUS: ICD-10-CM

## 2018-10-16 DIAGNOSIS — D18.01 CHERRY ANGIOMA: ICD-10-CM

## 2018-10-16 DIAGNOSIS — L82.1 SEBORRHEIC KERATOSIS: ICD-10-CM

## 2018-10-16 DIAGNOSIS — L57.8 SUN-DAMAGED SKIN: ICD-10-CM

## 2018-10-16 DIAGNOSIS — L72.0 EPIDERMAL CYST: ICD-10-CM

## 2018-10-16 PROCEDURE — 99213 OFFICE O/P EST LOW 20 MIN: CPT | Performed by: NURSE PRACTITIONER

## 2018-10-16 NOTE — PROGRESS NOTES
"Chief Complaint   Patient presents with   • Skin Lesion         HISTORY OF THE PRESENT ILLNESS: Patient is a 78 y.o. female.  Kacie is here today requesting a skin check.  She has no personal or family history of skin cancer.  She does report she has been treated in the past with liquid nitrogen on her face.  She has never smoked or chewed tobacco.      Epidermal cyst  She has a skin lesion on her face that has been bothering her for some time      Seborrheic keratosis  She has noticed dry lesions on her trunk and extremities that concern her.       Allergies: Food; Sulfa drugs; Ace inhibitors; Augmentin; and Nifedipine              Review of Systems   Skin: Positive for lesions on her face and trunk    Exam: Blood pressure 110/70, pulse 72, temperature 36 °C (96.8 °F), height 1.626 m (5' 4\"), weight 90.3 kg (199 lb), last menstrual period 01/01/2002, not currently breastfeeding.  General: Normal appearing. No distress.  An examination was performed including the scalp( including the hair) , head and face, inspection of eyelids, lips , right and left ear externally but not ear canals.  Neck and chest and back.  Including  Both upper and lower extremities, including hands and feet and nails and between fingers and toes.     All areas were found to be within normal limits except as noted in the description below.     Face with scattered lentigo  Right left chin epidermal cyst in the area patient indicates.   Neck at hairline with nevus flammeus   Back with marked sun damage on the upper back. epidermal cyst right upper back .  Trunk with scattered seborreic keratosis   Chest cherry angioma  Legs with scattered seborrheic keratosis                Please note that this dictation was created using voice recognition software. I have made every reasonable attempt to correct obvious errors, but I expect that there are errors of grammar and possibly content that I did not discover before finalizing the " note.      Assessment/Plan  1. Epidermal cyst      We discussed these lesions would need to be excised to be treated completely.  Patient is not interested in doing that at this time.   2. Seborrheic keratosis      We discussed these are benign lesions that are persistent.   3. Nevus flammeus      We discussed this is a benign lesion that requires no treatment at this time.   4. Cherry angioma      We discussed these are benign lesions requiring no treatment.   5. Sun-damaged skin      We discussed this is a benign condition requiring no treatment.   Patient is encouraged to wear sunscreen daily.  She is encouraged to wear sun protective clothing.  She is given a written information on emollients.  She will return for follow-up in 1 year or sooner if she discovers any concerning lesions

## 2018-12-13 ENCOUNTER — OFFICE VISIT (OUTPATIENT)
Dept: INTERNAL MEDICINE | Facility: IMAGING CENTER | Age: 78
End: 2018-12-13
Payer: MEDICARE

## 2018-12-13 VITALS
WEIGHT: 175 LBS | HEART RATE: 55 BPM | SYSTOLIC BLOOD PRESSURE: 122 MMHG | HEIGHT: 64 IN | OXYGEN SATURATION: 97 % | DIASTOLIC BLOOD PRESSURE: 70 MMHG | RESPIRATION RATE: 14 BRPM | BODY MASS INDEX: 29.88 KG/M2

## 2018-12-13 DIAGNOSIS — M15.9 PRIMARY OSTEOARTHRITIS INVOLVING MULTIPLE JOINTS: Chronic | ICD-10-CM

## 2018-12-13 DIAGNOSIS — Z12.39 BREAST CANCER SCREENING: ICD-10-CM

## 2018-12-13 DIAGNOSIS — M81.0 OSTEOPOROSIS, POST-MENOPAUSAL: Chronic | ICD-10-CM

## 2018-12-13 DIAGNOSIS — M54.50 ACUTE RIGHT-SIDED LOW BACK PAIN WITHOUT SCIATICA: ICD-10-CM

## 2018-12-13 PROCEDURE — 99214 OFFICE O/P EST MOD 30 MIN: CPT | Performed by: FAMILY MEDICINE

## 2018-12-13 RX ORDER — METHYLPREDNISOLONE 4 MG/1
TABLET ORAL
Qty: 21 TAB | Refills: 0 | Status: SHIPPED | OUTPATIENT
Start: 2018-12-13 | End: 2019-08-13

## 2018-12-14 NOTE — PROGRESS NOTES
"Chief Complaint   Patient presents with   • Back Pain     right sided   • Hip Pain     right       HPI:  Patient is a 78 y.o. female established patient who presents today for evaluation of new right low back and hip pain present for the past couple of weeks. She denies specific event that led to her pain event but now reports that pain radiates from right low back to right hip and into right groin area and is exacerbated with standing and sitting too long in one position. She has not used ice/heat/OTC NSAIDs to treat this condition and last experienced right hip issues for years ago. She saw Dr. Mathur at that time and had a steroid injection for comfort. She feels that this condition is both \"similar and different\" and denies associated systemic illness nor bladder/ bowel involvement. She is overdue for her annual fasting labs/ Medicare visit and screening mammogram.     Patient Active Problem List    Diagnosis Date Noted   • Epidermal cyst 10/16/2018   • Seborrheic keratosis 10/16/2018   • Microscopic colitis 09/05/2017   • Essential hypertension 12/01/2015   • Obesity (BMI 30.0-34.9) 04/09/2015   • Mixed hyperlipidemia with apolipoprotein E2 variant 04/09/2015   • Insulin resistance 04/09/2015   • Metabolic syndrome 04/09/2015   • Sinus bradycardia 03/16/2015   • Eczema 10/14/2014   • Abnormal biliary HIDA scan 10/14/2014   • Osteoporosis, post-menopausal 10/14/2014   • Environmental allergies 04/18/2014   • Vitamin D deficiency disease 11/13/2013   • Dupuytren's contracture of both hands 05/22/2013   • Degenerative joint disease      Past medical, surgical, family, and social history was reviewed in Epic chart by me today.     Medications and allergies reviewed and updated in Epic chart by me today.     ROS:  Pertinent positives listed above in HPI. All other systems have been reviewed and are negative.    PE:   /70 (BP Location: Right arm, Patient Position: Sitting)   Pulse (!) 55   Resp 14   Ht " "1.626 m (5' 4\")   Wt 79.4 kg (175 lb)   LMP 01/01/2002   SpO2 97%   BMI 30.04 kg/m²   Vital signs reviewed with patient.     Gen: Well developed; well nourished; no acute distress; age appropriate appearance   CV: Regular rate and rhythm; S1/ S2 present; no murmur, gallop or rub noted  Pulm: No respiratory distress; clear to ascultation b/l; no wheezing or stridor noted b/l  Abd: Adequate bowel sounds noted; soft and nontender; no rebound, rigidity, nor distention; no hepatosplenogmegaly   Back/spine: no midline pain with palpation; right lumbar paraspinal muscle tenderness but no bertha right sciatic notch area pain; pain over right hip and groin area  Extremities: No peripheral edema b/l LE extremities/ no clubbing nor cyanosis noted  Skin: Warm and dry; no rashes noted   Neuro: No focal deficits noted; gait demonstrates small limp but she is able to get up out of chair without assistance   Psych: AAOx4; mood and affect are appropriate    A/P:  1. Acute right-sided low back pain without sciatica/ Osteoporosis, post-menopausal/ Primary osteoarthritis involving multiple joints  I feel that her discomfort is originating from L spine area and radiating to right hip and groin areas. Work up options discussed with patient - will obtain MRI L spine and have patient take medrol dose pack for pain control (does not want NSAID or pain medication)/ encouraged her to use heat or ice on affected areas PRN and follow clinical response.   - MethylPREDNISolone (MEDROL DOSEPAK) 4 MG Tablet Therapy Pack; As directed on the packaging label.  Dispense: 21 Tab; Refill: 0  - MR-LUMBAR SPINE-W/O; Future    2. Breast cancer screening  Annual mammogram is overdue - pt provided with number to schedule important screening exam.   - MA-SCREEN MAMMO W/CAD-BILAT; Future     I will be in touch with patient when imaging studies are resulted, and she will follow up for fasting lab draw and Medicare wellness visit in early 2019.        "

## 2018-12-17 ENCOUNTER — HOSPITAL ENCOUNTER (OUTPATIENT)
Dept: RADIOLOGY | Facility: MEDICAL CENTER | Age: 78
End: 2018-12-17
Attending: FAMILY MEDICINE
Payer: MEDICARE

## 2018-12-17 DIAGNOSIS — M15.9 PRIMARY OSTEOARTHRITIS INVOLVING MULTIPLE JOINTS: Chronic | ICD-10-CM

## 2018-12-17 DIAGNOSIS — Z12.39 BREAST CANCER SCREENING: ICD-10-CM

## 2018-12-17 DIAGNOSIS — M81.0 OSTEOPOROSIS, POST-MENOPAUSAL: Chronic | ICD-10-CM

## 2018-12-17 DIAGNOSIS — M54.50 ACUTE RIGHT-SIDED LOW BACK PAIN WITHOUT SCIATICA: ICD-10-CM

## 2018-12-17 PROCEDURE — 77067 SCR MAMMO BI INCL CAD: CPT

## 2018-12-17 PROCEDURE — 72148 MRI LUMBAR SPINE W/O DYE: CPT

## 2018-12-26 DIAGNOSIS — I10 ESSENTIAL HYPERTENSION: Chronic | ICD-10-CM

## 2018-12-26 RX ORDER — LOSARTAN POTASSIUM 100 MG/1
100 TABLET ORAL DAILY
Qty: 90 TAB | Refills: 4 | Status: SHIPPED | OUTPATIENT
Start: 2018-12-26 | End: 2022-07-10 | Stop reason: SDUPTHER

## 2018-12-27 DIAGNOSIS — G89.29 CHRONIC BILATERAL LOW BACK PAIN WITH SCIATICA, SCIATICA LATERALITY UNSPECIFIED: ICD-10-CM

## 2018-12-27 DIAGNOSIS — M54.40 CHRONIC BILATERAL LOW BACK PAIN WITH SCIATICA, SCIATICA LATERALITY UNSPECIFIED: ICD-10-CM

## 2018-12-27 DIAGNOSIS — M15.9 PRIMARY OSTEOARTHRITIS INVOLVING MULTIPLE JOINTS: Chronic | ICD-10-CM

## 2019-01-01 RX ORDER — TELMISARTAN 40 MG/1
40 TABLET ORAL DAILY
Qty: 30 TAB | Refills: 3 | Status: SHIPPED | OUTPATIENT
Start: 2019-01-01 | End: 2019-08-13

## 2019-01-18 RX ORDER — LOSARTAN POTASSIUM 50 MG/1
100 TABLET ORAL DAILY
Qty: 60 TAB | Refills: 2 | Status: SHIPPED | OUTPATIENT
Start: 2019-01-18 | End: 2019-03-11 | Stop reason: SDUPTHER

## 2019-03-11 RX ORDER — LOSARTAN POTASSIUM 50 MG/1
100 TABLET ORAL DAILY
Qty: 180 TAB | Refills: 3 | Status: SHIPPED | OUTPATIENT
Start: 2019-03-11 | End: 2019-06-18 | Stop reason: SDUPTHER

## 2019-03-20 ENCOUNTER — APPOINTMENT (OUTPATIENT)
Dept: DERMATOLOGY | Facility: IMAGING CENTER | Age: 79
End: 2019-03-20

## 2019-05-14 DIAGNOSIS — R53.83 OTHER FATIGUE: ICD-10-CM

## 2019-05-14 DIAGNOSIS — E55.9 VITAMIN D DEFICIENCY DISEASE: Chronic | ICD-10-CM

## 2019-05-14 DIAGNOSIS — I10 ESSENTIAL HYPERTENSION: Chronic | ICD-10-CM

## 2019-05-14 DIAGNOSIS — E78.2 MIXED HYPERLIPIDEMIA WITH APOLIPOPROTEIN E2 VARIANT: Chronic | ICD-10-CM

## 2019-05-14 DIAGNOSIS — R73.9 HYPERGLYCEMIA: ICD-10-CM

## 2019-05-15 ENCOUNTER — NON-PROVIDER VISIT (OUTPATIENT)
Dept: INTERNAL MEDICINE | Facility: IMAGING CENTER | Age: 79
End: 2019-05-15

## 2019-05-15 ENCOUNTER — HOSPITAL ENCOUNTER (OUTPATIENT)
Facility: MEDICAL CENTER | Age: 79
End: 2019-05-15
Attending: FAMILY MEDICINE
Payer: MEDICARE

## 2019-05-15 DIAGNOSIS — E55.9 VITAMIN D DEFICIENCY DISEASE: Chronic | ICD-10-CM

## 2019-05-15 DIAGNOSIS — E78.2 MIXED HYPERLIPIDEMIA WITH APOLIPOPROTEIN E2 VARIANT: Chronic | ICD-10-CM

## 2019-05-15 DIAGNOSIS — R73.9 HYPERGLYCEMIA: ICD-10-CM

## 2019-05-15 DIAGNOSIS — E78.2 MIXED DYSLIPIDEMIA: ICD-10-CM

## 2019-05-15 DIAGNOSIS — R53.83 OTHER FATIGUE: ICD-10-CM

## 2019-05-15 DIAGNOSIS — I10 ESSENTIAL HYPERTENSION: Chronic | ICD-10-CM

## 2019-05-15 PROCEDURE — 85025 COMPLETE CBC W/AUTO DIFF WBC: CPT

## 2019-05-15 PROCEDURE — 80053 COMPREHEN METABOLIC PANEL: CPT

## 2019-05-15 PROCEDURE — 80061 LIPID PANEL: CPT

## 2019-05-15 PROCEDURE — 84443 ASSAY THYROID STIM HORMONE: CPT

## 2019-05-15 PROCEDURE — 83036 HEMOGLOBIN GLYCOSYLATED A1C: CPT

## 2019-05-15 PROCEDURE — 82306 VITAMIN D 25 HYDROXY: CPT

## 2019-05-15 RX ORDER — LOVASTATIN 10 MG/1
10 TABLET ORAL
Qty: 90 TAB | Refills: 3 | Status: SHIPPED | OUTPATIENT
Start: 2019-05-15 | End: 2020-05-11

## 2019-05-16 LAB
25(OH)D3 SERPL-MCNC: 26 NG/ML (ref 30–100)
ALBUMIN SERPL BCP-MCNC: 3.9 G/DL (ref 3.2–4.9)
ALBUMIN/GLOB SERPL: 1.5 G/DL
ALP SERPL-CCNC: 64 U/L (ref 30–99)
ALT SERPL-CCNC: 23 U/L (ref 2–50)
ANION GAP SERPL CALC-SCNC: 6 MMOL/L (ref 0–11.9)
AST SERPL-CCNC: 22 U/L (ref 12–45)
BASOPHILS # BLD AUTO: 0.6 % (ref 0–1.8)
BASOPHILS # BLD: 0.05 K/UL (ref 0–0.12)
BILIRUB SERPL-MCNC: 0.6 MG/DL (ref 0.1–1.5)
BUN SERPL-MCNC: 20 MG/DL (ref 8–22)
CALCIUM SERPL-MCNC: 8.9 MG/DL (ref 8.5–10.5)
CHLORIDE SERPL-SCNC: 105 MMOL/L (ref 96–112)
CHOLEST SERPL-MCNC: 164 MG/DL (ref 100–199)
CO2 SERPL-SCNC: 28 MMOL/L (ref 20–33)
CREAT SERPL-MCNC: 0.89 MG/DL (ref 0.5–1.4)
EOSINOPHIL # BLD AUTO: 0.2 K/UL (ref 0–0.51)
EOSINOPHIL NFR BLD: 2.4 % (ref 0–6.9)
ERYTHROCYTE [DISTWIDTH] IN BLOOD BY AUTOMATED COUNT: 50.4 FL (ref 35.9–50)
EST. AVERAGE GLUCOSE BLD GHB EST-MCNC: 134 MG/DL
GLOBULIN SER CALC-MCNC: 2.6 G/DL (ref 1.9–3.5)
GLUCOSE SERPL-MCNC: 91 MG/DL (ref 65–99)
HBA1C MFR BLD: 6.3 % (ref 0–5.6)
HCT VFR BLD AUTO: 44.3 % (ref 37–47)
HDLC SERPL-MCNC: 59 MG/DL
HGB BLD-MCNC: 13.9 G/DL (ref 12–16)
IMM GRANULOCYTES # BLD AUTO: 0.03 K/UL (ref 0–0.11)
IMM GRANULOCYTES NFR BLD AUTO: 0.4 % (ref 0–0.9)
LDLC SERPL CALC-MCNC: 78 MG/DL
LYMPHOCYTES # BLD AUTO: 3.08 K/UL (ref 1–4.8)
LYMPHOCYTES NFR BLD: 37.3 % (ref 22–41)
MCH RBC QN AUTO: 31.4 PG (ref 27–33)
MCHC RBC AUTO-ENTMCNC: 31.4 G/DL (ref 33.6–35)
MCV RBC AUTO: 100 FL (ref 81.4–97.8)
MONOCYTES # BLD AUTO: 0.55 K/UL (ref 0–0.85)
MONOCYTES NFR BLD AUTO: 6.7 % (ref 0–13.4)
NEUTROPHILS # BLD AUTO: 4.35 K/UL (ref 2–7.15)
NEUTROPHILS NFR BLD: 52.6 % (ref 44–72)
NRBC # BLD AUTO: 0 K/UL
NRBC BLD-RTO: 0 /100 WBC
PLATELET # BLD AUTO: 256 K/UL (ref 164–446)
PMV BLD AUTO: 10.8 FL (ref 9–12.9)
POTASSIUM SERPL-SCNC: 4 MMOL/L (ref 3.6–5.5)
PROT SERPL-MCNC: 6.5 G/DL (ref 6–8.2)
RBC # BLD AUTO: 4.43 M/UL (ref 4.2–5.4)
SODIUM SERPL-SCNC: 139 MMOL/L (ref 135–145)
TRIGL SERPL-MCNC: 133 MG/DL (ref 0–149)
TSH SERPL DL<=0.005 MIU/L-ACNC: 2.63 UIU/ML (ref 0.38–5.33)
WBC # BLD AUTO: 8.3 K/UL (ref 4.8–10.8)

## 2019-06-18 RX ORDER — LOSARTAN POTASSIUM 50 MG/1
100 TABLET ORAL DAILY
Qty: 180 TAB | Refills: 3 | Status: SHIPPED | OUTPATIENT
Start: 2019-06-18 | End: 2019-07-01

## 2019-06-27 ENCOUNTER — OFFICE VISIT (OUTPATIENT)
Dept: INTERNAL MEDICINE | Facility: IMAGING CENTER | Age: 79
End: 2019-06-27
Payer: MEDICARE

## 2019-06-27 VITALS
SYSTOLIC BLOOD PRESSURE: 115 MMHG | HEART RATE: 53 BPM | BODY MASS INDEX: 30.83 KG/M2 | OXYGEN SATURATION: 95 % | TEMPERATURE: 99.1 F | RESPIRATION RATE: 16 BRPM | WEIGHT: 180.6 LBS | HEIGHT: 64 IN | DIASTOLIC BLOOD PRESSURE: 64 MMHG

## 2019-06-27 DIAGNOSIS — I10 ESSENTIAL HYPERTENSION: Chronic | ICD-10-CM

## 2019-06-27 DIAGNOSIS — E78.2 MIXED HYPERLIPIDEMIA WITH APOLIPOPROTEIN E2 VARIANT: Chronic | ICD-10-CM

## 2019-06-27 DIAGNOSIS — K52.838 OTHER MICROSCOPIC COLITIS: ICD-10-CM

## 2019-06-27 DIAGNOSIS — E88.819 INSULIN RESISTANCE: Chronic | ICD-10-CM

## 2019-06-27 DIAGNOSIS — E66.9 OBESITY (BMI 30.0-34.9): Chronic | ICD-10-CM

## 2019-06-27 DIAGNOSIS — Z00.00 ENCOUNTER FOR MEDICARE ANNUAL WELLNESS EXAM: ICD-10-CM

## 2019-06-27 DIAGNOSIS — E55.9 VITAMIN D DEFICIENCY DISEASE: Chronic | ICD-10-CM

## 2019-06-27 DIAGNOSIS — E88.810 METABOLIC SYNDROME: Chronic | ICD-10-CM

## 2019-06-27 DIAGNOSIS — R00.1 SINUS BRADYCARDIA: Chronic | ICD-10-CM

## 2019-06-27 DIAGNOSIS — M81.0 OSTEOPOROSIS, POST-MENOPAUSAL: Chronic | ICD-10-CM

## 2019-06-27 PROCEDURE — G0439 PPPS, SUBSEQ VISIT: HCPCS | Performed by: FAMILY MEDICINE

## 2019-06-27 ASSESSMENT — ACTIVITIES OF DAILY LIVING (ADL): BATHING_REQUIRES_ASSISTANCE: 0

## 2019-06-27 ASSESSMENT — PATIENT HEALTH QUESTIONNAIRE - PHQ9: CLINICAL INTERPRETATION OF PHQ2 SCORE: 0

## 2019-06-27 ASSESSMENT — ENCOUNTER SYMPTOMS: GENERAL WELL-BEING: GOOD

## 2019-07-01 NOTE — PROGRESS NOTES
CC:   Medicare Annual Wellness Visit    HPI:  Kacie is a 78 y.o. Female here for her Medicare Annual Wellness Visit. She has been doing well overall and denies new health changes. She has a history of microscopic colitis and denies recent flairs. She has chronic essential HTN historically controlled on daily medication and chronic vitamin D deficiency with daily vitamin D supplementation. She has chronic metabolic syndrome without DM diagnosis. She also suffers from chronic obesity and chronic DJD/osteoporosis. She has known osteoporosis and is overdue for Reclast infusion. She will be due for dexa scan in October 2019 and has residual stiffness from spinal ablations in past. She endorses 100% medication compliance and is in a very happy mood today.     Patient Active Problem List    Diagnosis Date Noted   • Epidermal cyst 10/16/2018   • Seborrheic keratosis 10/16/2018   • Microscopic colitis 09/05/2017   • Essential hypertension 12/01/2015   • Obesity (BMI 30.0-34.9) 04/09/2015   • Mixed hyperlipidemia with apolipoprotein E2 variant 04/09/2015   • Insulin resistance 04/09/2015   • Metabolic syndrome 04/09/2015   • Sinus bradycardia 03/16/2015   • Eczema 10/14/2014   • Abnormal biliary HIDA scan 10/14/2014   • Osteoporosis, post-menopausal 10/14/2014   • Environmental allergies 04/18/2014   • Vitamin D deficiency disease 11/13/2013   • Dupuytren's contracture of both hands 05/22/2013   • Degenerative joint disease      Current Outpatient Prescriptions   Medication Sig Dispense Refill   • lovastatin (MEVACOR) 10 MG tablet Take 1 Tab by mouth every bedtime. 90 Tab 3   • losartan (COZAAR) 100 MG Tab Take 1 Tab by mouth every day. 90 Tab 4   • atenolol (TENORMIN) 25 MG Tab TAKE 1 TAB BY MOUTH EVERY DAY. 90 Tab 3   • Cyanocobalamin (B-12) 1000 MCG Cap Take 1 Tab by mouth every day.     • Cholecalciferol (VITAMIN D3) 5000 UNITS CAPS Take 2,000 Units by mouth every day.     • Melatonin 5 MG TABS Take 1 Tab by mouth at  bedtime as needed.     • Coral Calcium 1000 (390 CA) MG TABS Take 1 Each by mouth every day. 60 Each    • Ascorbic Acid (VITAMIN C) 1000 MG TABS Take 1 Tab by mouth. 60 Each    • telmisartan (MICARDIS) 40 MG Tab Take 1 Tab by mouth every day. (Patient not taking: Reported on 6/27/2019) 30 Tab 3   • MethylPREDNISolone (MEDROL DOSEPAK) 4 MG Tablet Therapy Pack As directed on the packaging label. (Patient not taking: Reported on 6/27/2019) 21 Tab 0   • cetirizine (KLS ALLER-AMERICO) 10 MG Tab Take 10 mg by mouth every bedtime.     • Loperamide HCl (IMODIUM PO) Take 1 Tab by mouth as needed (For diarrhea).       No current facility-administered medications for this visit.       Current supplements: see MAR  Chronic narcotic pain medicines: no  Allergies: Food; Sulfa drugs; Ace inhibitors; Augmentin; and Nifedipine  Exercise: yes  Current social contact/activities: yes  Current mood: good  Advance Directive on file: no    Screening:  Depression Screening    Little interest or pleasure in doing things?  0 - not at all  Feeling down, depressed , or hopeless? 0 - not at all  Patient Health Questionnaire Score: 0     Screening for Cognitive Impairment    Three Minute Recall (village, kitchen, baby) 3/3    Omkar clock face with all 12 numbers and set the hands to show 10 past 10.  Yes    Cognitive concerns identified deferred for follow up unless specifically addressed in assessment and plan.    Fall Risk Assessment    Has the patient had two or more falls in the last year or any fall with injury in the last year?  No    Safety Assessment    Throw rugs on floor.  No  Handrails on all stairs.  Yes  Good lighting in all hallways.  Yes  Difficulty hearing.  Yes  Patient counseled about all safety risks that were identified.    Functional Assessment ADLs    Are there any barriers preventing you from cooking for yourself or meeting nutritional needs?  No.    Are there any barriers preventing you from driving safely or obtaining  transportation?  No.    Are there any barriers preventing you from using a telephone or calling for help?  No.    Are there any barriers preventing you from shopping?  No.    Are there any barriers preventing you from taking care of your own finances?  No.    Are there any barriers preventing you from managing your medications?  No.    Are there any barriers preventing you from showering, bathing or dressing yourself?  No.    Are you currently engaging in any exercise or physical activity?  No.     What is your perception of your health?  Good.      Health Maintenance Summary                IMM INFLUENZA Next Due 9/1/2019      Done 10/5/2018 Imm Admin: Influenza Vaccine Adult HD     Patient has more history with this topic...    BONE DENSITY Next Due 10/22/2019      Done 10/22/2014 DS-BONE DENSITY STUDY (DEXA)     Patient has more history with this topic...    MAMMOGRAM Next Due 12/17/2019      Done 12/17/2018 MA-SCREENING MAMMO BILAT W/TOMOSYNTHESIS W/CAD     Patient has more history with this topic...    Annual Wellness Visit Next Due 7/1/2020      Done 7/1/2019 SUBSEQUENT ANNUAL WELLNESS VISIT-INCLUDES PPPS ()     Patient has more history with this topic...    IMM DTaP/Tdap/Td Vaccine Next Due 5/22/2023      Done 5/22/2013 Imm Admin: Tdap Vaccine          Social History   Substance Use Topics   • Smoking status: Never Smoker   • Smokeless tobacco: Never Used   • Alcohol use 3.6 oz/week     6 Standard drinks or equivalent per week      Comment: 3-4 glasses per week     Family History   Problem Relation Age of Onset   • Lung Disease Mother    • Cancer Mother         lung cancer   • Cancer Paternal Aunt         breast     She  has a past medical history of Anxiety; Arthritis (03/12/15); Degenerative joint disease; Epidermal cyst (10/16/2018); High cholesterol; HTN (hypertension), benign; Hyperlipemia; Hypertension; Microscopic colitis (2010); Neck pain on left side (5/31/2017); Osteoporosis; Other specified  "symptom associated with female genital organs; Seborrheic keratosis (10/16/2018); and Snoring. She also has no past medical history of Anesthesia; Cold; or Dental disorder.   Past Surgical History:   Procedure Laterality Date   • HYSTEROSCOPY WITH VIDEO DIAGNOSTIC  3/13/2015    Performed by Alexandria Keenan M.D. at SURGERY SAME DAY [facility] ORS   • DILATION AND CURETTAGE  3/13/2015    Performed by Alexandria Keenan M.D. at SURGERY SAME DAY [facility]   • BREAST BIOPSY  11/13/08    Performed by ISRAEL HICKS at SURGERY SAME DAY [facility] ORS X3   • COLON RESECTION  2000    polyps   • APPENDECTOMY     • OTHER NEUROLOGICAL SURG     • TONSILLECTOMY     • US-NEEDLE CORE BX-BREAST PANEL         ROS:    All positives noted in HPI. All others reviewed and are negative.    Ostomy or other tubes or amputations: no  Chronic oxygen use: no  Last eye exam: UTD per pt report  : denies urinary incontinence; does not interfere with ADLs/ sleep  Gait: stable  Problems with balance/ difficulty walking: no  Hearing: good   Dentition: adequate    Lab results from 5/15/19 reviewed in detail with patient at visit today.     Exam:   /64 (BP Location: Left arm, Patient Position: Sitting, BP Cuff Size: Adult)   Pulse (!) 53   Temp 37.3 °C (99.1 °F) (Temporal)   Resp 16   Ht 1.626 m (5' 4\")   Wt 81.9 kg (180 lb 9.6 oz)   SpO2 95%  Body mass index is 31 kg/m².    Gen: Well developed; well nourished; no acute distress; age appropriate appearance   HEENT: Normocephalic; atraumatic; PEERLA b/l; sclera clear b/l; b/l external auditory canals WNL; b/l TM WNL; nares patent; oropharynx clear; oral mucosa moist; tongue midline; dentition adequate   Neck: No adenopathy; no thyromegaly  CV: Regular rate and rhythm; S1/ S2 present; no murmur, gallop or rub noted  Pulm: No respiratory distress; clear to ascultation b/l; no wheezing or stridor noted b/l  Abd: Adequate bowel sounds noted; soft and nontender; no rebound, rigidity, nor " distention  Extremities: No peripheral edema b/l LE extremities/ no clubbing nor cyanosis noted  Skin: Warm and dry; no rashes noted   Neuro: No focal deficits noted; pt is able to get up out of chair unassisted and walk forward  Psych: AAOx4; mood and affect are appropriate    Assessment and Plan:  1. Vitamin D deficiency disease  Uncontrolled/ recommend patient increase daily Vitamin D intake by 1000 IU.   - Subsequent Annual Wellness Visit - Includes PPPS ()    2. Osteoporosis, post-menopausal  Stable/ order faxed to [facility] for Reclast infusion. Pt will be due for dexa scan in October 2019.   - Subsequent Annual Wellness Visit - Includes PPPS ()    3. Sinus bradycardia  Stable/ pt remains asymptomatic   - Subsequent Annual Wellness Visit - Includes PPPS ()    4. Obesity (BMI 30.0-34.9)  Stable/ encouragement provided to patient to continue regular exercise and health diet choices.   - Subsequent Annual Wellness Visit - Includes PPPS ()    5. Mixed hyperlipidemia with apolipoprotein E2 variant  Stable/ pt is to continue daily Mevacor use.   - Subsequent Annual Wellness Visit - Includes PPPS ()    6. Insulin resistance  Stable/ HgA1c remains at 6/3% - recommend ongoing attention to healthy low sugar/ low white carbohydrate choices and ongoing exercise.   - Subsequent Annual Wellness Visit - Includes PPPS ()    7. Metabolic syndrome  Stable  - Subsequent Annual Wellness Visit - Includes PPPS ()    8. Essential hypertension  Stable/ pt is to continue current daily medication use.   - Subsequent Annual Wellness Visit - Includes PPPS ()    9. Other microscopic colitis  Stable without recent flair  - Subsequent Annual Wellness Visit - Includes PPPS ()    10. Encounter for Medicare annual wellness exam  Pt is stable at this time and is UTD with HCM items.   - Subsequent Annual Wellness Visit - Includes PPPS ()     Services needed: no new services needed at  this time  Health Care Screening: recommendations as per orders if indicated.  Referrals offered: none   Counseling provided today:  · Prevent falls and reduce trip hazards; Secure or remove rugs if present   · Maintain working fire alarm and carbon monoxide detectors   · Engage in regular physical activity daily and social activities weekly as tolerated

## 2019-07-17 ENCOUNTER — APPOINTMENT (OUTPATIENT)
Dept: DERMATOLOGY | Facility: IMAGING CENTER | Age: 79
End: 2019-07-17

## 2019-07-22 ENCOUNTER — PATIENT MESSAGE (OUTPATIENT)
Dept: INTERNAL MEDICINE | Facility: IMAGING CENTER | Age: 79
End: 2019-07-22

## 2019-07-22 DIAGNOSIS — N32.81 OAB (OVERACTIVE BLADDER): ICD-10-CM

## 2019-07-22 RX ORDER — OXYBUTYNIN CHLORIDE 5 MG/1
5 TABLET, EXTENDED RELEASE ORAL DAILY
Qty: 30 TAB | Refills: 1 | Status: SHIPPED | OUTPATIENT
Start: 2019-07-22 | End: 2019-08-13

## 2019-07-22 RX ORDER — TOLTERODINE 4 MG/1
4 CAPSULE, EXTENDED RELEASE ORAL DAILY
Qty: 30 CAP | Refills: 3 | Status: SHIPPED | OUTPATIENT
Start: 2019-07-22 | End: 2019-11-11

## 2019-08-13 ENCOUNTER — OUTPATIENT INFUSION SERVICES (OUTPATIENT)
Dept: ONCOLOGY | Facility: MEDICAL CENTER | Age: 79
End: 2019-08-13
Attending: FAMILY MEDICINE
Payer: MEDICARE

## 2019-08-13 VITALS
BODY MASS INDEX: 30.67 KG/M2 | OXYGEN SATURATION: 94 % | RESPIRATION RATE: 18 BRPM | HEART RATE: 50 BPM | TEMPERATURE: 98.5 F | HEIGHT: 64 IN | WEIGHT: 179.68 LBS | SYSTOLIC BLOOD PRESSURE: 191 MMHG | DIASTOLIC BLOOD PRESSURE: 62 MMHG

## 2019-08-13 DIAGNOSIS — M81.0 OSTEOPOROSIS, POST-MENOPAUSAL: Chronic | ICD-10-CM

## 2019-08-13 LAB
CA-I BLD ISE-SCNC: 1.17 MMOL/L (ref 1.1–1.3)
CREAT BLD-MCNC: 1 MG/DL (ref 0.5–1.4)

## 2019-08-13 PROCEDURE — 36415 COLL VENOUS BLD VENIPUNCTURE: CPT

## 2019-08-13 PROCEDURE — 96365 THER/PROPH/DIAG IV INF INIT: CPT

## 2019-08-13 PROCEDURE — 700111 HCHG RX REV CODE 636 W/ 250 OVERRIDE (IP): Performed by: FAMILY MEDICINE

## 2019-08-13 PROCEDURE — 82330 ASSAY OF CALCIUM: CPT

## 2019-08-13 PROCEDURE — 82565 ASSAY OF CREATININE: CPT

## 2019-08-13 RX ORDER — ZOLEDRONIC ACID 5 MG/100ML
5 INJECTION, SOLUTION INTRAVENOUS ONCE
Status: COMPLETED | OUTPATIENT
Start: 2019-08-13 | End: 2019-08-13

## 2019-08-13 RX ADMIN — ZOLEDRONIC ACID 5 MG: 5 INJECTION, SOLUTION INTRAVENOUS at 14:16

## 2019-08-13 NOTE — LETTER
Infusion Services   [address] [place] [id number]  Phone: [phone]  Fax: [phone]              Dear Dr. Barroso,    Your patient, Kacie Rubalcava (: 1940), was scheduled at [facility].  Kacie's encounter diagnosis is:  1. Osteoporosis, post-menopausal  ISTAT CREATININE    ISTAT IONIZED CA    ISTAT CREATININE    ISTAT IONIZED CA     She arrived for her appointment, and  the scheduled treatment was   given. These medications were administered to the patient: We administered zoledronic Acid..  Kacie Rubalcava  tolerated treatment. In addition, the following labs were drawn    Recent Results (from the past 24 hour(s))   ISTAT CREATININE    Collection Time: 19  1:59 PM   Result Value Ref Range    Istat Creatinine 1.0 0.5 - 1.4 mg/dL   ISTAT IONIZED CA    Collection Time: 19  1:59 PM   Result Value Ref Range    Istat Ionized Calcium 1.17 1.10 - 1.30 mmol/L        Kacie did not reschedule; because she must see her provider first.    For more information, you may review the nurse's progress notes in chart review under the notes section.       Sincerely,  Mag Edmond R.N.

## 2019-08-13 NOTE — PROGRESS NOTES
Pt arrived to [facility] ambulatory, here for yearly Reclast for osteoporosis. IV access established. Pt denies any recent/planned invasive dental procedures and no active infections. iStat Ca/Creat drawn with IV start. Reclast infused per MAR. Pt miriam well. Line flushed clear. IV flushed and removed. No future appts in place, pt aware she will need to follow up with MD for plan of care. Pt discharged home under self care in no apparent distress.

## 2019-08-13 NOTE — PROGRESS NOTES
Pharmacy note  Cr = 1 mg/dL, CrCl ~ 59 ml/min  Ionized Ca = 1.17    Last Reclast 06/09/17  OK for zoledronic acid (Reclast) 5 mg IV today.    Tabitha Hurtado, PharmD, BCOP

## 2019-09-11 RX ORDER — OXYBUTYNIN CHLORIDE 5 MG/1
TABLET, EXTENDED RELEASE ORAL
Qty: 90 TAB | Refills: 2 | Status: SHIPPED | OUTPATIENT
Start: 2019-09-11 | End: 2021-04-05

## 2019-09-16 ENCOUNTER — OFFICE VISIT (OUTPATIENT)
Dept: INTERNAL MEDICINE | Facility: IMAGING CENTER | Age: 79
End: 2019-09-16
Payer: MEDICARE

## 2019-09-16 VITALS
TEMPERATURE: 97.8 F | HEART RATE: 51 BPM | DIASTOLIC BLOOD PRESSURE: 72 MMHG | SYSTOLIC BLOOD PRESSURE: 124 MMHG | HEIGHT: 64 IN | BODY MASS INDEX: 30.67 KG/M2 | RESPIRATION RATE: 17 BRPM | OXYGEN SATURATION: 97 % | WEIGHT: 179.68 LBS

## 2019-09-16 DIAGNOSIS — Z23 NEED FOR INFLUENZA VACCINATION: ICD-10-CM

## 2019-09-16 DIAGNOSIS — H57.12 RETRO-ORBITAL PAIN OF LEFT EYE: ICD-10-CM

## 2019-09-16 PROCEDURE — 99213 OFFICE O/P EST LOW 20 MIN: CPT | Mod: 25 | Performed by: FAMILY MEDICINE

## 2019-09-16 PROCEDURE — 90662 IIV NO PRSV INCREASED AG IM: CPT | Performed by: FAMILY MEDICINE

## 2019-09-16 PROCEDURE — G0008 ADMIN INFLUENZA VIRUS VAC: HCPCS | Performed by: FAMILY MEDICINE

## 2019-09-16 NOTE — PROGRESS NOTES
"Chief Complaint   Patient presents with   • Eye Pain     Left eye pain that is described as sharp, \"electric\" pain that lasts a few seconds. Last night it lasted about 30 seconds. Comes and goes with no warning.        HPI:  Patient is a 79 y.o. female established patient who presents today to discuss new left eye problem. She reports new onset of \"zinging pain\" behind left eye ball that comes on without warning and can last for up to 30 seconds.  This problem started a few weeks ago without inciting event, and patient denies associated headache nor visual changes. She denies having eye drainage nor any lesions on left eyelid. She attempted to get an appointment with her optometrist today but would rather see an ophthalmologist for evaluation. She is [place] for a 89 year old birthday party and would like to obtain flu vaccine today.     Patient Active Problem List    Diagnosis Date Noted   • Epidermal cyst 10/16/2018   • Seborrheic keratosis 10/16/2018   • Microscopic colitis 09/05/2017   • Essential hypertension 12/01/2015   • Obesity (BMI 30.0-34.9) 04/09/2015   • Mixed hyperlipidemia with apolipoprotein E2 variant 04/09/2015   • Insulin resistance 04/09/2015   • Metabolic syndrome 04/09/2015   • Sinus bradycardia 03/16/2015   • Eczema 10/14/2014   • Abnormal biliary HIDA scan 10/14/2014   • Osteoporosis, post-menopausal 10/14/2014   • Environmental allergies 04/18/2014   • Vitamin D deficiency disease 11/13/2013   • Dupuytren's contracture of both hands 05/22/2013   • Degenerative joint disease        Past medical, surgical, family, and social history was reviewed and updated in Epic chart by me today.     Medications and allergies reviewed and updated in Epic chart by me today.     ROS:  Pertinent positives listed above in HPI. All other systems have been reviewed and are negative.    PE:   /72 (BP Location: Left arm, Patient Position: Sitting, BP Cuff Size: Adult)   Pulse (!) 51   Temp " "36.6 °C (97.8 °F) (Temporal)   Resp 17   Ht 1.613 m (5' 3.5\")   Wt 81.5 kg (179 lb 10.8 oz)   LMP 01/01/2002   SpO2 97%   BMI 31.33 kg/m²   Vital signs reviewed with patient.     Gen: Well developed; well nourished; no acute distress; age appropriate appearance   HEENT: Normocephalic; atraumatic; PEERLA b/l; sclera clear b/l; no overt left eye lesion/ no proptosis noted on inspection today; hearing intact; nares patent; oropharynx clear; oral mucosa moist; tongue midline; dentition adequate    Extremities: No peripheral edema b/l LE extremities/ no clubbing nor cyanosis noted  Skin: Warm and dry; no rashes noted   Neuro: No focal deficits noted   Psych: AAOx4; mood and affect are appropriate    A/P:  1. Need for influenza vaccination  Pt is traveling later this week and will be around extreme elderly individuals. Vaccine administered at visit today.  - INFLUENZA VACCINE, HIGH DOSE (65+ ONLY)    2. Retro-orbital pain of left eye  New problem present intermittently for the past few weeks. Pt reports behind eyeball \"zinging pain\" that last for less than 30 seconds at a time and comes on without provocation. She does not have visual changes nor signs of active infection at visit today. Patient has an optometrist but would like Ophthalmology exam - will refer to [facility] for evaluation and treatment. Pt provided with their office number to call to schedule appt.   - REFERRAL TO OPHTHALMOLOGY          "

## 2019-10-11 ENCOUNTER — OFFICE VISIT (OUTPATIENT)
Dept: DERMATOLOGY | Facility: IMAGING CENTER | Age: 79
End: 2019-10-11
Payer: MEDICARE

## 2019-10-11 DIAGNOSIS — L90.8 SKIN AGING: ICD-10-CM

## 2019-10-11 DIAGNOSIS — Z12.83 SKIN CANCER SCREENING: ICD-10-CM

## 2019-10-11 DIAGNOSIS — L81.4 LENTIGO: ICD-10-CM

## 2019-10-11 DIAGNOSIS — L82.1 SEBORRHEIC KERATOSIS: ICD-10-CM

## 2019-10-11 DIAGNOSIS — L85.3 XEROSIS CUTIS: ICD-10-CM

## 2019-10-11 PROCEDURE — 99213 OFFICE O/P EST LOW 20 MIN: CPT | Performed by: DERMATOLOGY

## 2019-10-11 NOTE — PROGRESS NOTES
CC: Skin Exam    Subjective: Previously seen patient here for skin exam.  Worried about dry lips  Denies concerning skin spots. No moles growing/changing.  No spots itching, burning, crusting or bleeding.    History of skin cancer: No  Limited sun exposure in the past - tried to limit burns/exposures.    No new areas of concern.  Last exam 1 year ago    ROS: no fevers/chills. No itch.    DermPMH: no skin cancer/melanoma  No problem-specific Assessment & Plan notes found for this encounter.    Relevant PMH: hx eczema  Social: never smoker    PE: Gen:WDWN female in NAD.  Skin:  Scalp/face/eyes/lips/oral mucosa/conjunctivae/neck/chest/back/arms/legs/hands/feet/buttocks - without suspicious lesions noted.  Genitals exam declined  -tan macules macules/thin waxy papules appearing on torso/extremities, appearing benign without suspicious features  -xerosis of lips    A/P:   Lentigos/SKs: benign  -reassurance  -reviewed skin cancer detection/prevention    Lip xerosis  -aquaphor recommended    F/u 1-2 years/PRN    I have reviewed medications relevant to my specialty.

## 2019-10-15 ENCOUNTER — APPOINTMENT (OUTPATIENT)
Dept: DERMATOLOGY | Facility: IMAGING CENTER | Age: 79
End: 2019-10-15

## 2019-10-28 RX ORDER — ATENOLOL 25 MG/1
TABLET ORAL
Qty: 90 TAB | Refills: 3 | Status: SHIPPED | OUTPATIENT
Start: 2019-10-28 | End: 2020-10-28

## 2019-11-11 ENCOUNTER — OFFICE VISIT (OUTPATIENT)
Dept: INTERNAL MEDICINE | Facility: IMAGING CENTER | Age: 79
End: 2019-11-11
Payer: MEDICARE

## 2019-11-11 VITALS
BODY MASS INDEX: 29.94 KG/M2 | TEMPERATURE: 99 F | WEIGHT: 179.68 LBS | SYSTOLIC BLOOD PRESSURE: 122 MMHG | RESPIRATION RATE: 17 BRPM | HEART RATE: 56 BPM | HEIGHT: 65 IN | DIASTOLIC BLOOD PRESSURE: 73 MMHG | OXYGEN SATURATION: 96 %

## 2019-11-11 DIAGNOSIS — E28.39 ESTROGEN DEFICIENCY: ICD-10-CM

## 2019-11-11 DIAGNOSIS — R68.89 THROAT CONGESTION: ICD-10-CM

## 2019-11-11 DIAGNOSIS — I10 ESSENTIAL HYPERTENSION: ICD-10-CM

## 2019-11-11 DIAGNOSIS — E66.9 OBESITY (BMI 30.0-34.9): Chronic | ICD-10-CM

## 2019-11-11 DIAGNOSIS — N32.81 OAB (OVERACTIVE BLADDER): ICD-10-CM

## 2019-11-11 DIAGNOSIS — R60.0 EDEMA OF LEG: ICD-10-CM

## 2019-11-11 DIAGNOSIS — M85.9 DISORDER OF BONE DENSITY AND STRUCTURE, UNSPECIFIED: ICD-10-CM

## 2019-11-11 DIAGNOSIS — M81.0 OSTEOPOROSIS, POST-MENOPAUSAL: Chronic | ICD-10-CM

## 2019-11-11 PROCEDURE — 99214 OFFICE O/P EST MOD 30 MIN: CPT | Performed by: FAMILY MEDICINE

## 2019-11-11 RX ORDER — OXYBUTYNIN CHLORIDE 10 MG/1
10 TABLET, EXTENDED RELEASE ORAL DAILY
Qty: 30 TAB | Refills: 3 | Status: SHIPPED | OUTPATIENT
Start: 2019-11-11 | End: 2021-04-05

## 2019-11-11 RX ORDER — HYDRALAZINE HYDROCHLORIDE 10 MG/1
10 TABLET, FILM COATED ORAL 3 TIMES DAILY PRN
Qty: 30 TAB | Refills: 1 | Status: SHIPPED | OUTPATIENT
Start: 2019-11-11 | End: 2021-04-05

## 2019-11-11 NOTE — PROGRESS NOTES
Chief Complaint   Patient presents with   • Nasal Congestion     Severe congestion that never goes away unable to produce sputum.    • Leg Swelling     Right leg swelling, larger than left.    • Patient Question     Wants to discuss oxybutynin. Still increased frequency, difficult to fall back asleep.    • Blood Pressure Problem     Will be having dental surgery in two weeks. Wonders if there is anything she can take to prevent this.        HPI:  Patient is a 79 y.o. female established patient who presents today to discuss multiple new health concerns. She reports having throat congestion, especially upon wakening in the morning, without associated acute illness since her last visit with me. She feels that post nasal drip accumulates in the back of her throat, and she is unable to cough up sputum. She has been using Flonase BID without resolution of her condition and is seeking other treatment options. She also has chronic OAB and has been using Ditropan XL 5 mg daily with some symptom improvement. She is still urinating twice per night and is seeking perhaps increased medication dose to help with symptom control. She also has noticed increased swelling of bilateral legs (R>L) since she has been wearing mid calf socks this Fall season. She denies associated pain, no shortness of breath, and no other contributing factors. She endorses that her lower legs have always been heavier with R larger than L but she also feels that this condition is different than her baseline. She also has two dental surgeries in the coming weeks and reports that her SBP often elevates to 180s-190s prior to procedures. Although she feels calm prior to these procedures, she is wondering about a PRN blood pressure medication to use for treating these blood pressure spikes. She denies other new complaints and is in good spirits today.     Patient Active Problem List    Diagnosis Date Noted   • Epidermal cyst 10/16/2018   • Seborrheic keratosis  "10/16/2018   • Microscopic colitis 09/05/2017   • Essential hypertension 12/01/2015   • Obesity (BMI 30.0-34.9) 04/09/2015   • Mixed hyperlipidemia with apolipoprotein E2 variant 04/09/2015   • Insulin resistance 04/09/2015   • Metabolic syndrome 04/09/2015   • Sinus bradycardia 03/16/2015   • Eczema 10/14/2014   • Abnormal biliary HIDA scan 10/14/2014   • Osteoporosis, post-menopausal 10/14/2014   • Environmental allergies 04/18/2014   • Vitamin D deficiency disease 11/13/2013   • Dupuytren's contracture of both hands 05/22/2013   • Degenerative joint disease        Past medical, surgical, family, and social history was reviewed in Epic chart by me today.     Medications and allergies reviewed and updated in Epic chart by me today.     ROS:  Pertinent positives listed above in HPI. All other systems have been reviewed and are negative.    PE:   /73 (BP Location: Left arm, Patient Position: Sitting, BP Cuff Size: Adult)   Pulse (!) 56   Temp 37.2 °C (99 °F) (Temporal)   Resp 17   Ht 1.638 m (5' 4.5\")   Wt 81.5 kg (179 lb 10.8 oz)   LMP 01/01/2002   SpO2 96%   BMI 30.36 kg/m²   Vital signs reviewed with patient.     Gen: Well developed; well nourished; no acute distress; age appropriate appearance   CV: Regular rate and rhythm; S1/ S2 present; no murmur, gallop or rub noted  Pulm: No respiratory distress; clear to ascultation b/l; no wheezing or stridor noted b/l  Abd: Adequate bowel sounds noted; soft and nontender; no rebound, rigidity, nor distention  Extremities: Mild edema b/l LE extremities (R>L)/ both lower legs are obese/ no clubbing nor cyanosis noted  Skin: Warm and dry; no rashes noted   Neuro: No focal deficits noted   Psych: AAOx4; mood and affect are appropriate    A/P:  1. OAB (overactive bladder)  Ongoing issue for patient despite using Ditropan XL 5 mg daily. Will try increasing daily dose to 10 mg and follow clinical response. New RX sent to pharmacy.   - oxybutynin SR (DITROPAN-XL) 10 " MG CR tablet; Take 1 Tab by mouth every day.  Dispense: 30 Tab; Refill: 3    2. Essential hypertension  Stable except prior to dental procedures, where patient reports SBP can elevate to 190s. Will trial having patient take Hydralazine 10 mg at least 1 hour prior to upcoming dental procedure and follow clinical response. New RX sent to pharmacy.   - hydrALAZINE (APRESOLINE) 10 MG Tab; Take 1 Tab by mouth 3 times a day as needed (SBP>160).  Dispense: 30 Tab; Refill: 1    3. Disorder of bone density and structure, unspecified/ Estrogen deficiency/ Osteoporosis, post-menopausal  Pt is due for repeat dexa scan to monitor known osteoporosis. Pt provided with number to call to schedule imaging exam in the near future.  - DS-BONE DENSITY STUDY (DEXA); Future    4. Edema of leg  New problem for patient of unclear etiology. Will start work up with bilateral LE US - no signs of heart failure present on exam today.   - US-EXTREMITY VENOUS LOWER BILAT; Future    5. Obesity (BMI 30.0-34.9)  - Patient identified as having weight management issue. Appropriate orders and counseling given.     6. Throat congestion  Patient has throat congestion from post nasal drip - recommend trial of plain Mucinex BID, Rhinocort BID, hydrate, and follow clinical response.

## 2019-12-02 ENCOUNTER — HOSPITAL ENCOUNTER (OUTPATIENT)
Dept: RADIOLOGY | Facility: MEDICAL CENTER | Age: 79
End: 2019-12-02
Attending: FAMILY MEDICINE
Payer: MEDICARE

## 2019-12-02 DIAGNOSIS — E28.39 ESTROGEN DEFICIENCY: ICD-10-CM

## 2019-12-02 DIAGNOSIS — M85.9 DISORDER OF BONE DENSITY AND STRUCTURE, UNSPECIFIED: ICD-10-CM

## 2019-12-02 DIAGNOSIS — R60.0 EDEMA OF LEG: ICD-10-CM

## 2019-12-02 PROCEDURE — 77080 DXA BONE DENSITY AXIAL: CPT

## 2019-12-02 PROCEDURE — 93970 EXTREMITY STUDY: CPT

## 2020-02-17 DIAGNOSIS — K52.838 OTHER MICROSCOPIC COLITIS: ICD-10-CM

## 2020-02-17 RX ORDER — BUDESONIDE 3 MG/1
9 CAPSULE, COATED PELLETS ORAL EVERY MORNING
Qty: 100 CAP | Refills: 2 | Status: SHIPPED | OUTPATIENT
Start: 2020-02-17 | End: 2021-01-12 | Stop reason: SDUPTHER

## 2020-05-05 ENCOUNTER — HOSPITAL ENCOUNTER (OUTPATIENT)
Facility: MEDICAL CENTER | Age: 80
End: 2020-05-05
Attending: FAMILY MEDICINE
Payer: MEDICARE

## 2020-05-05 ENCOUNTER — NON-PROVIDER VISIT (OUTPATIENT)
Dept: INTERNAL MEDICINE | Facility: IMAGING CENTER | Age: 80
End: 2020-05-05
Payer: MEDICARE

## 2020-05-05 DIAGNOSIS — E78.2 MIXED HYPERLIPIDEMIA WITH APOLIPOPROTEIN E2 VARIANT: Chronic | ICD-10-CM

## 2020-05-05 DIAGNOSIS — E55.9 VITAMIN D DEFICIENCY: Chronic | ICD-10-CM

## 2020-05-05 DIAGNOSIS — R73.9 HYPERGLYCEMIA: ICD-10-CM

## 2020-05-05 DIAGNOSIS — R53.83 OTHER FATIGUE: ICD-10-CM

## 2020-05-05 DIAGNOSIS — I10 ESSENTIAL HYPERTENSION: Chronic | ICD-10-CM

## 2020-05-05 LAB
25(OH)D3 SERPL-MCNC: 35 NG/ML (ref 30–100)
ALBUMIN SERPL BCP-MCNC: 3.8 G/DL (ref 3.2–4.9)
ALBUMIN/GLOB SERPL: 1.5 G/DL
ALP SERPL-CCNC: 47 U/L (ref 30–99)
ALT SERPL-CCNC: 13 U/L (ref 2–50)
ANION GAP SERPL CALC-SCNC: 11 MMOL/L (ref 7–16)
AST SERPL-CCNC: 13 U/L (ref 12–45)
BASOPHILS # BLD AUTO: 0.9 % (ref 0–1.8)
BASOPHILS # BLD: 0.06 K/UL (ref 0–0.12)
BILIRUB SERPL-MCNC: 0.6 MG/DL (ref 0.1–1.5)
BUN SERPL-MCNC: 22 MG/DL (ref 8–22)
CALCIUM SERPL-MCNC: 9.3 MG/DL (ref 8.5–10.5)
CHLORIDE SERPL-SCNC: 100 MMOL/L (ref 96–112)
CHOLEST SERPL-MCNC: 149 MG/DL (ref 100–199)
CO2 SERPL-SCNC: 24 MMOL/L (ref 20–33)
CREAT SERPL-MCNC: 1.16 MG/DL (ref 0.5–1.4)
CREAT UR-MCNC: 221.95 MG/DL
EOSINOPHIL # BLD AUTO: 0.25 K/UL (ref 0–0.51)
EOSINOPHIL NFR BLD: 3.8 % (ref 0–6.9)
ERYTHROCYTE [DISTWIDTH] IN BLOOD BY AUTOMATED COUNT: 46.1 FL (ref 35.9–50)
EST. AVERAGE GLUCOSE BLD GHB EST-MCNC: 128 MG/DL
GLOBULIN SER CALC-MCNC: 2.6 G/DL (ref 1.9–3.5)
GLUCOSE SERPL-MCNC: 129 MG/DL (ref 65–99)
HBA1C MFR BLD: 6.1 % (ref 0–5.6)
HCT VFR BLD AUTO: 43.3 % (ref 37–47)
HDLC SERPL-MCNC: 52 MG/DL
HGB BLD-MCNC: 14.4 G/DL (ref 12–16)
IMM GRANULOCYTES # BLD AUTO: 0.01 K/UL (ref 0–0.11)
IMM GRANULOCYTES NFR BLD AUTO: 0.2 % (ref 0–0.9)
LDLC SERPL CALC-MCNC: 68 MG/DL
LYMPHOCYTES # BLD AUTO: 2.78 K/UL (ref 1–4.8)
LYMPHOCYTES NFR BLD: 42.3 % (ref 22–41)
MCH RBC QN AUTO: 31.2 PG (ref 27–33)
MCHC RBC AUTO-ENTMCNC: 33.3 G/DL (ref 33.6–35)
MCV RBC AUTO: 93.9 FL (ref 81.4–97.8)
MICROALBUMIN UR-MCNC: 14 MG/DL
MICROALBUMIN/CREAT UR: 63 MG/G (ref 0–30)
MONOCYTES # BLD AUTO: 0.49 K/UL (ref 0–0.85)
MONOCYTES NFR BLD AUTO: 7.5 % (ref 0–13.4)
NEUTROPHILS # BLD AUTO: 2.98 K/UL (ref 2–7.15)
NEUTROPHILS NFR BLD: 45.3 % (ref 44–72)
NRBC # BLD AUTO: 0 K/UL
NRBC BLD-RTO: 0 /100 WBC
PLATELET # BLD AUTO: 255 K/UL (ref 164–446)
PMV BLD AUTO: 10.8 FL (ref 9–12.9)
POTASSIUM SERPL-SCNC: 4 MMOL/L (ref 3.6–5.5)
PROT SERPL-MCNC: 6.4 G/DL (ref 6–8.2)
RBC # BLD AUTO: 4.61 M/UL (ref 4.2–5.4)
SODIUM SERPL-SCNC: 135 MMOL/L (ref 135–145)
TRIGL SERPL-MCNC: 147 MG/DL (ref 0–149)
TSH SERPL DL<=0.005 MIU/L-ACNC: 1.88 UIU/ML (ref 0.38–5.33)
WBC # BLD AUTO: 6.6 K/UL (ref 4.8–10.8)

## 2020-05-05 PROCEDURE — 85025 COMPLETE CBC W/AUTO DIFF WBC: CPT

## 2020-05-05 PROCEDURE — 83036 HEMOGLOBIN GLYCOSYLATED A1C: CPT

## 2020-05-05 PROCEDURE — 82043 UR ALBUMIN QUANTITATIVE: CPT

## 2020-05-05 PROCEDURE — 82306 VITAMIN D 25 HYDROXY: CPT

## 2020-05-05 PROCEDURE — 80053 COMPREHEN METABOLIC PANEL: CPT

## 2020-05-05 PROCEDURE — 82570 ASSAY OF URINE CREATININE: CPT

## 2020-05-05 PROCEDURE — 80061 LIPID PANEL: CPT

## 2020-05-05 PROCEDURE — 84443 ASSAY THYROID STIM HORMONE: CPT

## 2020-05-06 DIAGNOSIS — G47.9 DISORDERED SLEEP: ICD-10-CM

## 2020-05-06 DIAGNOSIS — R40.0 DAYTIME SOMNOLENCE: ICD-10-CM

## 2020-05-10 DIAGNOSIS — E78.2 MIXED DYSLIPIDEMIA: ICD-10-CM

## 2020-05-11 RX ORDER — LOVASTATIN 10 MG/1
TABLET ORAL
Qty: 90 TAB | Refills: 3 | Status: SHIPPED | OUTPATIENT
Start: 2020-05-11 | End: 2021-05-17

## 2020-06-22 RX ORDER — LOSARTAN POTASSIUM 50 MG/1
TABLET ORAL
Qty: 180 TAB | Refills: 3 | Status: SHIPPED | OUTPATIENT
Start: 2020-06-22 | End: 2021-04-05

## 2020-08-26 ENCOUNTER — SLEEP CENTER VISIT (OUTPATIENT)
Dept: SLEEP MEDICINE | Facility: MEDICAL CENTER | Age: 80
End: 2020-08-26
Payer: MEDICARE

## 2020-08-26 VITALS
HEART RATE: 58 BPM | WEIGHT: 181 LBS | HEIGHT: 64 IN | DIASTOLIC BLOOD PRESSURE: 84 MMHG | BODY MASS INDEX: 30.9 KG/M2 | RESPIRATION RATE: 14 BRPM | OXYGEN SATURATION: 94 % | SYSTOLIC BLOOD PRESSURE: 124 MMHG

## 2020-08-26 DIAGNOSIS — G47.30 SLEEP DISORDER BREATHING: ICD-10-CM

## 2020-08-26 DIAGNOSIS — F51.04 CHRONIC INSOMNIA: ICD-10-CM

## 2020-08-26 PROCEDURE — 99204 OFFICE O/P NEW MOD 45 MIN: CPT | Performed by: FAMILY MEDICINE

## 2020-08-26 RX ORDER — ZOLPIDEM TARTRATE 5 MG/1
5 TABLET ORAL NIGHTLY PRN
Qty: 2 TAB | Refills: 0 | Status: SHIPPED | OUTPATIENT
Start: 2020-08-26 | End: 2020-08-27

## 2020-08-26 ASSESSMENT — FIBROSIS 4 INDEX: FIB4 SCORE: 1.13

## 2020-08-26 NOTE — PROGRESS NOTES
"     [facility]  Consult Note     Date: 8/26/2020 / Time: 1:43 PM      Thank you for requesting a sleep medicine consultation on Kacie Rubalcava at the sleep center. She presents today with the chief complaints of snoring, trouble staying asleep and excessive daytime sleepiness. She is referred by Dr. rolle for evaluation and treatment of sleep disorder breathing.     HISTORY OF PRESENT ILLNESS:       At night,  Kacie Rubalcava goes to bed around 11:30 pm on weekdays and on the weekends. She gets out of bed at 7:30 am on weekdays and on the weekends.  She averages about 6 hrs of sleep on a good night Pt has bad nights on most nights per week. She falls asleep within 10 minutes. She awakens 4+ times during the night due to bathroomuse. It takes her 15-20 min to fall back asleep.  She is aware of snoring however denies breathing pauses/gasping or choking in sleep.  She  denies any symptoms of restless legs syndrome such as an \"urge to move\"  She  legs in the evening or bedtime. She  denies any symptoms of narcolepsy such as sleep paralysis or cataplexy, or any symptoms to suggest parasomnias such as sleep walking or acting out of dreams. However she mentioned about nightmars which are about 50% of the nights. However denies prolonged awakening due to the nightmares. Anton hx of anxiety/depression or trauma. She  has not used any medications for the sleep problem.  Overall, she doesnot finds her sleep refreshing.  In terms of  excessive daytime sleepiness,  She complains of sleepiness while  at work, while reading or watching TV however denies while driving. Alba sleepiness scale score is 11/24.She does take regular naps. The naps are usually 10-60+ min long.She drinks about 1 caffeinated beverages per day.      REVIEW OF SYSTEMS:       Constitutional: Denies fevers, Denies weight changes  Eyes: Denies changes in vision, no eye pain  Ears/Nose/Throat/Mouth: Denies nasal congestion or sore throat "   Cardiovascular: Denies chest pain or palpitations   Respiratory: Denies shortness of breath , Denies cough  Gastrointestinal/Hepatic: Denies abdominal pain, nausea, vomiting,  Genitourinary: Denies bladder dysfunction, dysuria or frequency  Musculoskeletal/Rheum: Denies  joint pain and swelling   Skin/Breast: Denies rash  Neurological: Denies headache, confusion, memory loss or focal weakness/parasthesias  Psychiatric: denies mood disorder     Comprehensive review of systems form is reviewed with the patient and is attached in the EMR.     PMH:  has a past medical history of Anxiety, Arthritis (03/12/15), Chickenpox, Degenerative joint disease, Epidermal cyst (10/16/2018), Belgian measles, High cholesterol, HTN (hypertension), benign, Hyperlipemia, Hypertension, Microscopic colitis (2010), Neck pain on left side (5/31/2017), Osteoporosis, Other specified symptom associated with female genital organs, Seborrheic keratosis (10/16/2018), and Snoring. She also has no past medical history of Anesthesia, Cold, or Dental disorder.  MEDS:   Current Outpatient Medications:   •  lovastatin (MEVACOR) 10 MG tablet, TAKE 1 TABLET BY MOUTH EVERYDAY AT BEDTIME, Disp: 90 Tab, Rfl: 3  •  atenolol (TENORMIN) 25 MG Tab, TAKE 1 TABLET BY MOUTH EVERY DAY, Disp: 90 Tab, Rfl: 3  •  losartan (COZAAR) 100 MG Tab, Take 1 Tab by mouth every day., Disp: 90 Tab, Rfl: 4  •  Cyanocobalamin (B-12) 1000 MCG Cap, Take 1 Tab by mouth every day., Disp: , Rfl:   •  Cholecalciferol (VITAMIN D3) 5000 UNITS CAPS, Take 2,000 Units by mouth every day., Disp: , Rfl:   •  Coral Calcium 1000 (390 CA) MG TABS, Take 1 Each by mouth every day., Disp: 60 Each, Rfl:   •  losartan (COZAAR) 50 MG Tab, TAKE 2 TABLETS BY MOUTH EVERY DAY (Patient not taking: Reported on 8/26/2020), Disp: 180 Tab, Rfl: 3  •  budesonide (ENTOCORT EC) 3 MG Cap DR Particles capsule, Take 3 Caps by mouth every morning., Disp: 100 Cap, Rfl: 2  •  hydrALAZINE (APRESOLINE) 10 MG Tab, Take 1  "Tab by mouth 3 times a day as needed (SBP>160)., Disp: 30 Tab, Rfl: 1  •  oxybutynin SR (DITROPAN-XL) 10 MG CR tablet, Take 1 Tab by mouth every day. (Patient not taking: Reported on 8/26/2020), Disp: 30 Tab, Rfl: 3  •  oxybutynin SR (DITROPAN-XL) 5 MG TABLET SR 24 HR, TAKE 1 TABLET BY MOUTH EVERY DAY (Patient not taking: Reported on 8/26/2020), Disp: 90 Tab, Rfl: 2  •  cetirizine (KLS ALLER-AMERICO) 10 MG Tab, Take 10 mg by mouth every bedtime., Disp: , Rfl:   •  Loperamide HCl (IMODIUM PO), Take 1 Tab by mouth as needed (For diarrhea)., Disp: , Rfl:   •  Melatonin 5 MG TABS, Take 1 Tab by mouth at bedtime as needed., Disp: , Rfl:   ALLERGIES:   Allergies   Allergen Reactions   • Food Anaphylaxis and Vomiting     mussels   • Sulfa Drugs Anaphylaxis   • Ace Inhibitors Cough   • Augmentin Vomiting   • Nifedipine Swelling     Leg edema     SURGHX:   Past Surgical History:   Procedure Laterality Date   • HYSTEROSCOPY WITH VIDEO DIAGNOSTIC  3/13/2015    Performed by Alexandria Keenan M.D. at [facility]   • DILATION AND CURETTAGE  3/13/2015    Performed by Alexandria Keenan M.D. at [facility]   • BREAST BIOPSY  11/13/08    Performed by ISRAEL HICKS at [facility] X3   • COLON RESECTION  2000    polyps   • APPENDECTOMY     • OTHER NEUROLOGICAL SURG     • TONSILLECTOMY     • US-NEEDLE CORE BX-BREAST PANEL       SOCHX:  reports that she has never smoked. She has never used smokeless tobacco. She reports current alcohol use. She reports that she does not use drugs.   FH:   Family History   Problem Relation Age of Onset   • Lung Disease Mother    • Cancer Mother         lung cancer   • Cancer Paternal Aunt         breast           Physical Exam:  Vitals/ General Appearance:   Weight/BMI: Body mass index is 31.07 kg/m².  /84 (BP Location: Left arm, Patient Position: Sitting, BP Cuff Size: Adult)   Pulse (!) 58   Resp 14   Ht 1.626 m (5' 4\")   Wt 82.1 kg (181 lb)  " " SpO2 94%   Vitals:    08/26/20 1329   BP: 124/84   BP Location: Left arm   Patient Position: Sitting   BP Cuff Size: Adult   Pulse: (!) 58   Resp: 14   SpO2: 94%   Weight: 82.1 kg (181 lb)   Height: 1.626 m (5' 4\")       Pt. is alert and oriented to time, place and person. Cooperative and in no apparent distress.       Constitutional: Alert, no distress, well-groomed.  Skin: No rashes in visible areas.  Eye: Round. Conjunctiva clear, lids normal. No icterus.    Throat: Oropharynx appears crowded in that the palate is overhanging (Malam Emily scale 4. Tonsils are not enlarged, uvula is large, pharynx not inflamed.  ENMT: Lips pink without lesions, good dentition, moist mucous membranes. Phonation normal.  Neck: No masses, no thyromegaly. Moves freely without pain.  -. Lungs auscultation: B/L good air entry, vesicular breath sounds, no adventitious sounds  -. Heart auscultation: 1st and 2nd heart sounds normal, regular rhythm. No appreciable murmur.  - Extremities: no clubbing, no pedal edema.  - NEUROLOGICAL EXAMINATION: On neurological exam, the patient was alert and oriented x3. speech was clear and fluent without dysarthria.    INVESTIGATIONS:       ASSESSMENT AND PLAN     1. She  has symptoms of Obstructive Sleep Apnea (SHIRA). She  has excessive daytime sleepiness that  interferes with activites of daily living. She  risk factors for SHIRA include obesity and crowded oropharynx.     The pathophysiology of SHIRA and the increased risk of cardiovascular morbidity from untreated SHIRA is discussed in detail with the patient. We have discussed diagnostic options including in-laboratory, attended polysomnography and home sleep testing. We have also discussed treatment options including airway pressurization, reconstructive otolaryngologic surgery, dental appliances and weight management.       Subsequently,treatment options will be discussed based on the diagnostic study. Meanwhile, She is urged to avoid supine sleep, " weight gain and alcoholic beverages since all of these can worsen SHIRA. She is cautioned against drowsy driving. If She feels sleepy while driving, She must pull over for a break/nap, rather than persist on the road, in the interest of She own safety and that of others on the road.    Plan  -  She  will be scheduled for an overnight PSG to assess sleep related  breathing disorder.   - Ambien is prescribed only for the night of the SS. I have obtained and reviewed patient utilization report from [facility] prior to writing prescription for controlled substance II, III or IV. Based on the report and my clinical assessment the prescription is medically necessary. Pt was advised to use Ambien on as needed basis. Do not drive or use fast moving machinery after taking the medication for at least 6-8 hrs. Side affects were discussed in detail.      2. Chronic insomnia: The importance of cognitive restructuring and behavior modification therapy is emphasized for long-term improvement rather than the use of hypnotic agents, which may offer short term relief but may lead to the development of tolerance and side effects with prolonged use. Evening exercise, wind-down before bedtime, and stimulus control (leaving the bed when unable to fall back asleep at night) are explained.      Plan   - Handouts on stimulus control and sleep hygiene are given and a couple of titles of books on insomnia are recommended, written by behavioral psychologists.    - Reasses after treatment of SHIRA    3. Regarding treatment of other past medical problems and general health maintenance,  She is urged to follow up with PCP.

## 2020-08-26 NOTE — PATIENT INSTRUCTIONS
Zolpidem tablets  What is this medicine?  ZOLPIDEM (zole PI dem) is used to treat insomnia. This medicine helps you to fall asleep and sleep through the night.  This medicine may be used for other purposes; ask your health care provider or pharmacist if you have questions.  COMMON BRAND NAME(S): Black  What should I tell my health care provider before I take this medicine?  They need to know if you have any of these conditions:  · depression  · history of drug abuse or addiction  · if you often drink alcohol  · liver disease  · lung or breathing disease  · myasthenia gravis  · sleep apnea  · sleep-walking, driving, eating or other activity while not fully awake after taking a sleep medicine  · suicidal thoughts, plans, or attempt; a previous suicide attempt by you or a family member  · an unusual or allergic reaction to zolpidem, other medicines, foods, dyes, or preservatives  · pregnant or trying to get pregnant  · breast-feeding  How should I use this medicine?  Take this medicine by mouth with a glass of water. Follow the directions on the prescription label. It is better to take this medicine on an empty stomach and only when you are ready for bed. Do not take your medicine more often than directed. If you have been taking this medicine for several weeks and suddenly stop taking it, you may get unpleasant withdrawal symptoms. Your doctor or health care professional may want to gradually reduce the dose. Do not stop taking this medicine on your own. Always follow your doctor or health care professional's advice.  A special MedGuide will be given to you by the pharmacist with each prescription and refill. Be sure to read this information carefully each time.  Talk to your pediatrician regarding the use of this medicine in children. Special care may be needed.  Overdosage: If you think you have taken too much of this medicine contact a poison control center or emergency room at once.  NOTE: This medicine is only  "for you. Do not share this medicine with others.  What if I miss a dose?  This does not apply. This medicine should only be taken immediately before going to sleep. Do not take double or extra doses.  What may interact with this medicine?  · alcohol  · antihistamines for allergy, cough and cold  · certain medicines for anxiety or sleep  · certain medicines for depression, like amitriptyline, fluoxetine, sertraline  · certain medicines for fungal infections like ketoconazole and itraconazole  · certain medicines for seizures like phenobarbital, primidone  · ciprofloxacin  · dietary supplements for sleep, like valerian or kava kava  · general anesthetics like halothane, isoflurane, methoxyflurane, propofol  · local anesthetics like lidocaine, pramoxine, tetracaine  · medicines that relax muscles for surgery  · narcotic medicines for pain  · phenothiazines like chlorpromazine, mesoridazine, prochlorperazine, thioridazine  · rifampin  This list may not describe all possible interactions. Give your health care provider a list of all the medicines, herbs, non-prescription drugs, or dietary supplements you use. Also tell them if you smoke, drink alcohol, or use illegal drugs. Some items may interact with your medicine.  What should I watch for while using this medicine?  Visit your doctor or health care professional for regular checks on your progress. Keep a regular sleep schedule by going to bed at about the same time each night. Avoid caffeine-containing drinks in the evening hours. When sleep medicines are used every night for more than a few weeks, they may stop working. Talk to your doctor if you still have trouble sleeping.  After taking this medicine, you may get up out of bed and do an activity that you do not know you are doing. The next morning, you may have no memory of this. Activities include driving a car (\"sleep-driving\"), making and eating food, talking on the phone, sexual activity, and sleep-walking. " Serious injuries have occurred. Stop the medicine and call your doctor right away if you find out you have done any of these activities. Do not take this medicine if you have used alcohol that evening. Do not take it if you have taken another medicine for sleep. The risk of doing these sleep-related activities is higher.  Wait for at least 8 hours after you take a dose before driving or doing other activities that require full mental alertness. Do not take this medicine unless you are able to stay in bed for a full night (7 to 8 hours) before you must be active again. You may have a decrease in mental alertness the day after use, even if you feel that you are fully awake. Tell your doctor if you will need to perform activities requiring full alertness, such as driving, the next day. Do not stand or sit up quickly after taking this medicine, especially if you are an older patient. This reduces the risk of dizzy or fainting spells.  If you or your family notice any changes in your behavior, such as new or worsening depression, thoughts of harming yourself, anxiety, other unusual or disturbing thoughts, or memory loss, call your doctor right away.  After you stop taking this medicine, you may have trouble falling asleep. This is called rebound insomnia. This problem usually goes away on its own after 1 or 2 nights.  What side effects may I notice from receiving this medicine?  Side effects that you should report to your doctor or health care professional as soon as possible:  · allergic reactions like skin rash, itching or hives, swelling of the face, lips, or tongue  · breathing problems  · changes in vision  · confusion  · depressed mood or other changes in moods or emotions  · feeling faint or lightheaded, falls  · hallucinations  · loss of balance or coordination  · loss of memory  · numbness or tingling of the tongue  · restlessness, excitability, or feelings of anxiety or agitation  · signs and symptoms of liver  injury like dark yellow or brown urine; general ill feeling or flu-like symptoms; light-colored stools; loss of appetite; nausea; right upper belly pain; unusually weak or tired; yellowing of the eyes or skin  · suicidal thoughts  · unusual activities while not fully awake like driving, eating, making phone calls, or sexual activity  Side effects that usually do not require medical attention (report to your doctor or health care professional if they continue or are bothersome):  · dizziness  · drowsiness the day after you take this medicine  · headache  This list may not describe all possible side effects. Call your doctor for medical advice about side effects. You may report side effects to FDA at 7-064-FJC-4389.  Where should I keep my medicine?  Keep out of the reach of children. This medicine can be abused. Keep your medicine in a safe place to protect it from theft. Do not share this medicine with anyone. Selling or giving away this medicine is dangerous and against the law.  This medicine may cause accidental overdose and death if taken by other adults, children, or pets. Mix any unused medicine with a substance like cat litter or coffee grounds. Then throw the medicine away in a sealed container like a sealed bag or a coffee can with a lid. Do not use the medicine after the expiration date.  Store at room temperature between 20 and 25 degrees C (68 and 77 degrees F).  NOTE: This sheet is a summary. It may not cover all possible information. If you have questions about this medicine, talk to your doctor, pharmacist, or health care provider.  © 2020 [employer]vier/Gold Standard (2019-09-06 11:51:08)

## 2020-09-22 ENCOUNTER — SLEEP STUDY (OUTPATIENT)
Dept: SLEEP MEDICINE | Facility: MEDICAL CENTER | Age: 80
End: 2020-09-22
Attending: FAMILY MEDICINE
Payer: MEDICARE

## 2020-09-22 DIAGNOSIS — F51.04 CHRONIC INSOMNIA: ICD-10-CM

## 2020-09-22 DIAGNOSIS — G47.30 SLEEP DISORDER BREATHING: ICD-10-CM

## 2020-09-23 NOTE — PROCEDURES
Clinical Comments:   The patient underwent a split night polysomnogram with a CPAP titration using the standard montage for measurement of paramaters of sleep, respiratory events, movement abnormalities, heart rate and rhythm. A Microphone was used to monitior snoring.  Interpretation:  Study start time was 10:21:05 PM. Total recording time was 3h 37.5m (217 minutes) with a total sleep time of 2h 31.0m (151 minutes) resulting in a sleep efficiency of 69.43%.  Sleep latency from the start fo the study was 06 minutes minutes and REM latency from sleep onset was 00 minutes minutes.  Respiratory:   There were 199 apneas in total consisting of 188 obstructive apneas, 0 mixed apneas, and 11 central apneas. There were 10 hypopneas in total.  The apnea index was 79.07 per hour and the hypopnea index was 3.97 per hour.  The overall AHI was 83.0, with a REM AHI of 0.00, and a supine AHI of 0.00.  Limb Movements:  There were a total of 59 periodic leg movements, of which 15 were PLMS arousals. This resulted in a PLMS index of 23.4 and a PLMS arousal index of 6.0  Oximetry:  The mean SaO2 was 82.0% for the diagnostic portion of the study, with a minimum SaO2 of 58.0%.   Treatment:  Interpretation:  Treatment recording time was 3h 51.5m (231 minutes) with a total sleep time of 3h 32.0m (212 minutes) resulting in a sleep efficiency of 91.6%.   Sleep latency from the start of treatment was 06 minutes minutes and REM latency from sleep onset was 0h 13.0m minutes.   The patient had 42 arousals in total for an arousal index of 11.9.  Respiratory:   There were 41 apneas in total consisting of 25 obstructive apneas, 16 central apneas, and 0 mixed apneas for an apnea index of 11.60.   The patient had 11 hypopneas in total, which resulted in a hypopnea index of 3.11.   The overall AHI was 14.72, with a REM AHI of 6.34, and a supine AHI of 66.98.   Limb Movements:  There were a total of 33 periodic leg movements, of which 3 were PLMS  arousals. This resulted in a PLMS index of 9.3 and a PLMS arousal index of 0.8.  Oximetry:  The mean SaO2 during treatment was 91.0%, with a minimum oxygen saturation of 72.0%.    CPAP was tried from 5 to 12cm H2O.    On the baseline component of the study sleep efficiency was 69%.  There was a lack of stage III and REM sleep noted.  Sleep latency was 6 minutes.  PLM index of 21/h.  EKG showed sinus rhythm.    Once asleep frequent snoring was heard associated with obstructive apneas.  The overall AHI was 83/h and associated with desaturations to a herminia of 58%.  She spent 85% of the time below SPO2 of 90%.    After meeting split-night criteria CPAP therapy was initiated with sleep efficiency improving to 91%.  REM rebound was noted.  PLM index reduced to 9/h.    CPAP was titrated to 11 cm of water with resultant AHI of 0 and mean oximetry at 93%.    Interpretation:  Severe obstructive sleep apnea, AHI: 83/h with desaturations to 58% Sp02.  Successful CPAP titration at 11 cm of water.    Recommendations:  Given the severity of SHIRA, and excellent response to CPAP therapy, CPAP: 11 cm of water is recommended, using a medium AirFit F30 mask.

## 2020-09-24 PROCEDURE — 95811 POLYSOM 6/>YRS CPAP 4/> PARM: CPT | Performed by: INTERNAL MEDICINE

## 2020-10-02 ENCOUNTER — NON-PROVIDER VISIT (OUTPATIENT)
Dept: INTERNAL MEDICINE | Facility: IMAGING CENTER | Age: 80
End: 2020-10-02
Payer: MEDICARE

## 2020-10-02 DIAGNOSIS — Z23 NEED FOR VACCINATION: ICD-10-CM

## 2020-10-02 PROCEDURE — 90662 IIV NO PRSV INCREASED AG IM: CPT | Performed by: FAMILY MEDICINE

## 2020-10-02 PROCEDURE — G0008 ADMIN INFLUENZA VIRUS VAC: HCPCS | Performed by: FAMILY MEDICINE

## 2020-10-13 ENCOUNTER — APPOINTMENT (OUTPATIENT)
Dept: DERMATOLOGY | Facility: IMAGING CENTER | Age: 80
End: 2020-10-13

## 2020-10-16 ENCOUNTER — HOSPITAL ENCOUNTER (OUTPATIENT)
Dept: LAB | Facility: MEDICAL CENTER | Age: 80
End: 2020-10-16
Attending: FAMILY MEDICINE
Payer: MEDICARE

## 2020-10-16 DIAGNOSIS — Z20.822 ENCOUNTER FOR LABORATORY TESTING FOR COVID-19 VIRUS: ICD-10-CM

## 2020-10-16 LAB
COVID ORDER STATUS COVID19: NORMAL
SARS-COV-2 RNA RESP QL NAA+PROBE: NOTDETECTED
SPECIMEN SOURCE: NORMAL

## 2020-10-16 PROCEDURE — C9803 HOPD COVID-19 SPEC COLLECT: HCPCS

## 2020-10-16 PROCEDURE — U0003 INFECTIOUS AGENT DETECTION BY NUCLEIC ACID (DNA OR RNA); SEVERE ACUTE RESPIRATORY SYNDROME CORONAVIRUS 2 (SARS-COV-2) (CORONAVIRUS DISEASE [COVID-19]), AMPLIFIED PROBE TECHNIQUE, MAKING USE OF HIGH THROUGHPUT TECHNOLOGIES AS DESCRIBED BY CMS-2020-01-R: HCPCS

## 2020-10-19 ENCOUNTER — TELEPHONE (OUTPATIENT)
Dept: SLEEP MEDICINE | Facility: MEDICAL CENTER | Age: 80
End: 2020-10-19

## 2020-10-19 DIAGNOSIS — G47.33 OSA (OBSTRUCTIVE SLEEP APNEA): ICD-10-CM

## 2020-10-19 NOTE — TELEPHONE ENCOUNTER
Order is placed for patient to start CPAP 11 cmH2O with medium AirFit F30 mask. Please fax the order and call the patient with DME information. There is someone in [place] she does not want to use.  She has an appointment scheduled on 11/6 which may need to be pushed out unless she needs to review anything about her sleep study then she may keep that appointment.  She should still be scheduled for a 3-month follow-up.     Thank you  MAGNO Rebollar.

## 2020-10-28 RX ORDER — ATENOLOL 25 MG/1
TABLET ORAL
Qty: 90 TAB | Refills: 3 | Status: SHIPPED | OUTPATIENT
Start: 2020-10-28 | End: 2021-11-01

## 2020-11-24 ENCOUNTER — APPOINTMENT (OUTPATIENT)
Dept: DERMATOLOGY | Facility: IMAGING CENTER | Age: 80
End: 2020-11-24

## 2021-01-04 ENCOUNTER — TELEPHONE (OUTPATIENT)
Dept: SLEEP MEDICINE | Facility: MEDICAL CENTER | Age: 81
End: 2021-01-04

## 2021-01-04 ENCOUNTER — OFFICE VISIT (OUTPATIENT)
Dept: SLEEP MEDICINE | Facility: MEDICAL CENTER | Age: 81
End: 2021-01-04
Payer: MEDICARE

## 2021-01-04 VITALS
OXYGEN SATURATION: 95 % | DIASTOLIC BLOOD PRESSURE: 74 MMHG | SYSTOLIC BLOOD PRESSURE: 128 MMHG | HEIGHT: 64 IN | WEIGHT: 183 LBS | BODY MASS INDEX: 31.24 KG/M2 | HEART RATE: 54 BPM

## 2021-01-04 DIAGNOSIS — I10 ESSENTIAL HYPERTENSION: Chronic | ICD-10-CM

## 2021-01-04 DIAGNOSIS — F51.04 CHRONIC INSOMNIA: ICD-10-CM

## 2021-01-04 DIAGNOSIS — G47.33 OSA (OBSTRUCTIVE SLEEP APNEA): ICD-10-CM

## 2021-01-04 PROCEDURE — 99213 OFFICE O/P EST LOW 20 MIN: CPT | Performed by: NURSE PRACTITIONER

## 2021-01-04 ASSESSMENT — FIBROSIS 4 INDEX: FIB4 SCORE: 1.15

## 2021-01-04 NOTE — PROGRESS NOTES
Chief Complaint   Patient presents with   • Follow-Up     SHIRA-Last seen 08/26/2020   • Results     Sleep Study 09/22/2020       HPI:      Mrs. Rubalcava is a 79 y/o female patient who is in today for SHIRA f/u. PMH includes DJD, Dupuytren's contracture of both hands, environmental allergies, sinus bradycardia, hypertension.    PSG split night study from 9/22/20 indicated severe obstructive sleep apnea, AHI: 83/h with desaturations to 58% SpO2. Successful CPAP titration at 11 cm of water.     Patient received the CPAP in November of 2020. First compliance report from 12/5/20-1/3/21 was downloaded and reviewed with the patient which showed CPAP 11 cmH2O, 97% compliance, 6 hrs 58 min use, AHI of 15.6. She is tolerating the pressure well, but has struggled with the mask.  She has undergone 2 mask changes but continues to have leaks from the tube which wakes her up.  She would like to try a nasal pillow mask.  She goes to bed at 11:30 pm and wakes up at 7:30 am. She denies morning headache or snoring.   She awakens 4+ times during the night due to bathroomuse. It takes her 15-20 min to fall back asleep. She complains of postnasal drip and sinus drainage as well as dry mouth.  She has tried Biotene which does not work however XyloMelts gum is effective but is not able to use this when she sleeps.  She does take regular naps. The naps are usually 10-60+ min long. She drinks about 1 caffeinated beverages per day. She denies any new health problems or medications.      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 at [facility]   • DILATION AND CURETTAGE  3/13/2015    Performed by Alexandria Keenan M.D. at [facility]   • BREAST BIOPSY  11/13/08    Performed by ISRAEL HICKS at [facility] X3   • COLON RESECTION  2000    polyps   • APPENDECTOMY     • OTHER NEUROLOGICAL SURG     • TONSILLECTOMY     • US-NEEDLE CORE BX-BREAST PANEL         Family History   Problem Relation Age of Onset   • Lung Disease Mother    • Cancer Mother         lung cancer   • Cancer Paternal Aunt         breast       Social History     Socioeconomic History   • Marital status: Single     Spouse name: Not on file   • Number of children: Not on file   • Years of education: Not on file   • Highest education level: Not on file   Occupational History   • Not on file   Social Needs   • Financial resource strain: Not on file   • Food insecurity     Worry: Not on file     Inability: Not on file   • Transportation needs     Medical: Not on file     Non-medical: Not on file   Tobacco Use   • Smoking status: Never Smoker   • Smokeless tobacco: Never Used   Substance and Sexual Activity   • Alcohol use: Yes     Comment: occ   • Drug use: No   • Sexual activity: Yes     Partners: Male   Lifestyle   • Physical activity     Days per week: Not on file      Minutes per session: Not on file   • Stress: Not on file   Relationships   • Social connections     Talks on phone: Not on file     Gets together: Not on file     Attends [employer] service: Not on file     Active member of club or organization: Not on file     Attends meetings of clubs or organizations: Not on file     Relationship status: Not on file   • Intimate partner violence     Fear of current or ex partner: Not on file     Emotionally abused: Not on file     Physically abused: Not on file     Forced sexual activity: Not on file   Other Topics Concern   • Not on file   Social History Narrative   • Not on file       Allergies as of 01/04/2021 - Reviewed 01/04/2021   Allergen Reaction Noted   • Food Anaphylaxis and Vomiting 10/09/2011   • Sulfa drugs Anaphylaxis 11/30/2008   • Ace inhibitors Cough 02/27/2015   • Augmentin Vomiting 05/11/2018   • Nifedipine Swelling 02/27/2015        Vitals:  Vitals:    01/04/21 0850   BP: 128/74   Pulse: (!) 54   SpO2: 95%       Current medications as of today   Current Outpatient Medications   Medication Sig Dispense Refill   • atenolol (TENORMIN) 25 MG Tab TAKE 1 TABLET BY MOUTH EVERY DAY 90 Tab 3   • lovastatin (MEVACOR) 10 MG tablet TAKE 1 TABLET BY MOUTH EVERYDAY AT BEDTIME 90 Tab 3   • losartan (COZAAR) 100 MG Tab Take 1 Tab by mouth every day. 90 Tab 4   • Cyanocobalamin (B-12) 1000 MCG Cap Take 1 Tab by mouth every day.     • Cholecalciferol (VITAMIN D3) 5000 UNITS CAPS Take 2,000 Units by mouth every day.     • Melatonin 5 MG TABS Take 1 Tab by mouth at bedtime as needed.     • Coral Calcium 1000 (390 CA) MG TABS Take 1 Each by mouth every day. 60 Each    • losartan (COZAAR) 50 MG Tab TAKE 2 TABLETS BY MOUTH EVERY DAY (Patient not taking: Reported on 8/26/2020) 180 Tab 3   • budesonide (ENTOCORT EC) 3 MG Cap DR Particles capsule Take 3 Caps by mouth every morning. (Patient not taking: Reported on 1/4/2021) 100 Cap 2   • hydrALAZINE (APRESOLINE) 10 MG Tab Take 1 Tab  by mouth 3 times a day as needed (SBP>160). (Patient not taking: Reported on 1/4/2021) 30 Tab 1   • oxybutynin SR (DITROPAN-XL) 10 MG CR tablet Take 1 Tab by mouth every day. (Patient not taking: Reported on 8/26/2020) 30 Tab 3   • oxybutynin SR (DITROPAN-XL) 5 MG TABLET SR 24 HR TAKE 1 TABLET BY MOUTH EVERY DAY (Patient not taking: Reported on 8/26/2020) 90 Tab 2   • cetirizine (KLS ALLER-AMERICO) 10 MG Tab Take 10 mg by mouth every bedtime.     • Loperamide HCl (IMODIUM PO) Take 1 Tab by mouth as needed (For diarrhea).       No current facility-administered medications for this visit.          Physical Exam: Limited by COVID-19 precautions.  Appearance: Well developed, well nourished, no acute distress  Eyes: PERRL, EOM intact, sclera white, conjunctiva moist  Ears: no lesions or deformities  Hearing: grossly intact  Nose: no lesions or deformities  Respiratory effort: no intercostal retractions or use of accessory muscles  Extremities: no cyanosis or edema  Abdomen: soft   Gait and Station: normal  Digits and nails: no clubbing, cyanosis, petechiae or nodes.  Cranial nerves: grossly intact  Skin: no visible rashes, lesions or ulcers noted  Orientation: Oriented to time, person and place  Mood and affect: mood and affect appropriate, normal interaction with examiner  Judgement: Intact    Assessment:  1. SHIRA (obstructive sleep apnea)  DME Other    DME Other   2. Chronic insomnia     3. Essential hypertension     4. BMI 31.0-31.9,adult           Plan  Discussed the cardiovascular and neuropsychiatric risks of untreated SHIRA; including but not limited to: HTN, DM, MI, ASCVD, CVA, CHF, traffic accidents.     1. PSG split night study from 9/22/20 indicated severe obstructive sleep apnea, AHI: 83/h with desaturations to 58% SpO2. Successful CPAP titration at 11 cm of water.   Recommendation:  Given the severity of SHIRA, and excellent response to CPAP therapy, CPAP: 11 cm of water is  recommended, using a medium AirFit F30  mask.    2. First compliance report from 12/5/20-1/3/21 was downloaded and reviewed with the patient which showed CPAP 11 cmH2O, 97% compliance, 6 hrs 58 min use, AHI of 15.6. Continue CPAP. Patient is compliant and benefiting from CPAP therapy for management of SHIRA. Advised patient to use the CPAP every night for more than four hours for optimal health benefit.   3. DME order ([facility]) for mask (MOC) and supplies was provided today. Continue to clean mask and supplies weekly with soap and water, and change supplies per insurance guidelines.   4. DME order to increase CPAP to autoCPAP 11-15 cmH2O was provided today. DME order for mask fit was provided today.  Advised patient to use a chinstrap if she chooses a nasal pillow mask as she is a mouth breather.  5. Continue to stay active.    6. Follow up with the appropriate healthcare practitioners for all other medical problems and issues.  7. Sleep hygiene discussed. Recommend keeping a set sleep/wake schedule. Logging enough hours of sleep. Limiting/Avoiding naps. No caffeine after noon and no heavy meals in the evening.   8. Continue to f/u with PCP for BP management, take BP medication as directed and check BP at home.   9. F/u in 3 months.     ADDENDUM: 1/4/20  Patient's insurance changed to [employer] at the beginning of this year 2021 therefore she needs to turn her current CPAP machine over to [facility] and obtain a new CPAP machine through [facility]. An order to [facility] for new autoCPAP 11-15 cmH2O, MOC with mask fit was provided today.       MAGNO Rebollar.      This dictation was created using voice recognition software. The accuracy of the dictation is limited to the abilities of the software. I expect there may be some errors of grammar and possibly content.

## 2021-01-04 NOTE — TELEPHONE ENCOUNTER
Called and LVM to return our call patient now has SCP and needs to use DME:  [facility] / ph [phone] / fax [phone]. Pt. Currently has [facility] she will need to return CPAP and start with DME:  [facility].

## 2021-01-05 NOTE — TELEPHONE ENCOUNTER
Called and informed patient that a new order for CPAP has been faxed to DME:  [facility] /  [phone] / fax [phone]  Per insurance change

## 2021-01-05 NOTE — TELEPHONE ENCOUNTER
Patient called back AND left voice message that she needs to speak with Beth to discuss changing DME Companies. Patient states she wants to talk to Beth first. Please call her at [phone].    Message routed as requested.

## 2021-01-06 ENCOUNTER — HOSPITAL ENCOUNTER (OUTPATIENT)
Dept: LAB | Facility: MEDICAL CENTER | Age: 81
End: 2021-01-06
Attending: FAMILY MEDICINE
Payer: MEDICARE

## 2021-01-06 DIAGNOSIS — R19.7 DIARRHEA, UNSPECIFIED TYPE: ICD-10-CM

## 2021-01-06 DIAGNOSIS — Z20.822 ENCOUNTER FOR LABORATORY TESTING FOR COVID-19 VIRUS: ICD-10-CM

## 2021-01-06 LAB — COVID ORDER STATUS COVID19: NORMAL

## 2021-01-06 PROCEDURE — C9803 HOPD COVID-19 SPEC COLLECT: HCPCS

## 2021-01-06 PROCEDURE — U0005 INFEC AGEN DETEC AMPLI PROBE: HCPCS

## 2021-01-06 PROCEDURE — U0003 INFECTIOUS AGENT DETECTION BY NUCLEIC ACID (DNA OR RNA); SEVERE ACUTE RESPIRATORY SYNDROME CORONAVIRUS 2 (SARS-COV-2) (CORONAVIRUS DISEASE [COVID-19]), AMPLIFIED PROBE TECHNIQUE, MAKING USE OF HIGH THROUGHPUT TECHNOLOGIES AS DESCRIBED BY CMS-2020-01-R: HCPCS

## 2021-01-06 RX ORDER — DIPHENOXYLATE HYDROCHLORIDE AND ATROPINE SULFATE 2.5; .025 MG/1; MG/1
1 TABLET ORAL 4 TIMES DAILY PRN
Qty: 28 TAB | Refills: 2 | Status: SHIPPED | OUTPATIENT
Start: 2021-01-06 | End: 2021-01-13

## 2021-01-07 LAB
SARS-COV-2 RNA RESP QL NAA+PROBE: NOTDETECTED
SPECIMEN SOURCE: NORMAL

## 2021-01-11 DIAGNOSIS — Z23 NEED FOR VACCINATION: ICD-10-CM

## 2021-01-12 DIAGNOSIS — K52.838 OTHER MICROSCOPIC COLITIS: ICD-10-CM

## 2021-01-12 RX ORDER — BUDESONIDE 3 MG/1
9 CAPSULE, COATED PELLETS ORAL EVERY MORNING
Qty: 180 CAP | Refills: 0 | Status: SHIPPED | OUTPATIENT
Start: 2021-01-12 | End: 2021-03-08

## 2021-01-13 ENCOUNTER — TELEPHONE (OUTPATIENT)
Dept: SLEEP MEDICINE | Facility: MEDICAL CENTER | Age: 81
End: 2021-01-13

## 2021-01-13 NOTE — TELEPHONE ENCOUNTER
Made several attempts to reach out to [facility]. Unable to reach a live person and confirm order received.     Pt was contacted patient will contact [facility] and confirm.

## 2021-01-13 NOTE — TELEPHONE ENCOUNTER
Patient left vm states [facility] has not received CPAP order.     Order with supporting documentation resent. I will contact [facility] and confirm order was received.

## 2021-01-15 PROCEDURE — 91300 PFIZER SARS-COV-2 VACCINE: CPT

## 2021-01-15 PROCEDURE — 0001A PFIZER SARS-COV-2 VACCINE: CPT

## 2021-01-16 ENCOUNTER — IMMUNIZATION (OUTPATIENT)
Dept: FAMILY PLANNING/WOMEN'S HEALTH CLINIC | Facility: IMMUNIZATION CENTER | Age: 81
End: 2021-01-16
Payer: MEDICARE

## 2021-01-16 DIAGNOSIS — Z23 ENCOUNTER FOR VACCINATION: Primary | ICD-10-CM

## 2021-01-20 ENCOUNTER — APPOINTMENT (OUTPATIENT)
Dept: DERMATOLOGY | Facility: IMAGING CENTER | Age: 81
End: 2021-01-20

## 2021-02-05 ENCOUNTER — IMMUNIZATION (OUTPATIENT)
Dept: FAMILY PLANNING/WOMEN'S HEALTH CLINIC | Facility: IMMUNIZATION CENTER | Age: 81
End: 2021-02-05
Attending: INTERNAL MEDICINE
Payer: MEDICARE

## 2021-02-05 DIAGNOSIS — Z23 ENCOUNTER FOR VACCINATION: Primary | ICD-10-CM

## 2021-02-05 PROCEDURE — 0002A PFIZER SARS-COV-2 VACCINE: CPT

## 2021-02-05 PROCEDURE — 91300 PFIZER SARS-COV-2 VACCINE: CPT

## 2021-03-06 DIAGNOSIS — K52.838 OTHER MICROSCOPIC COLITIS: ICD-10-CM

## 2021-03-08 RX ORDER — BUDESONIDE 3 MG/1
9 CAPSULE, COATED PELLETS ORAL EVERY MORNING
Qty: 180 CAPSULE | Refills: 0 | Status: SHIPPED | OUTPATIENT
Start: 2021-03-08 | End: 2021-03-22

## 2021-03-22 DIAGNOSIS — K52.838 OTHER MICROSCOPIC COLITIS: ICD-10-CM

## 2021-03-22 RX ORDER — BUDESONIDE 3 MG/1
9 CAPSULE, COATED PELLETS ORAL EVERY MORNING
Qty: 270 CAPSULE | Refills: 1 | Status: SHIPPED | OUTPATIENT
Start: 2021-03-22 | End: 2021-05-21

## 2021-04-02 ENCOUNTER — PATIENT MESSAGE (OUTPATIENT)
Dept: HEALTH INFORMATION MANAGEMENT | Facility: OTHER | Age: 81
End: 2021-04-02

## 2021-04-05 ENCOUNTER — OFFICE VISIT (OUTPATIENT)
Dept: INTERNAL MEDICINE | Facility: IMAGING CENTER | Age: 81
End: 2021-04-05
Payer: MEDICARE

## 2021-04-05 VITALS
DIASTOLIC BLOOD PRESSURE: 70 MMHG | OXYGEN SATURATION: 96 % | RESPIRATION RATE: 17 BRPM | TEMPERATURE: 98.2 F | SYSTOLIC BLOOD PRESSURE: 118 MMHG | HEIGHT: 65 IN | BODY MASS INDEX: 30.49 KG/M2 | HEART RATE: 65 BPM | WEIGHT: 182.98 LBS

## 2021-04-05 DIAGNOSIS — M62.838 TRAPEZIUS MUSCLE SPASM: ICD-10-CM

## 2021-04-05 PROCEDURE — 99214 OFFICE O/P EST MOD 30 MIN: CPT | Performed by: FAMILY MEDICINE

## 2021-04-05 RX ORDER — CYCLOBENZAPRINE HCL 5 MG
5 TABLET ORAL 3 TIMES DAILY PRN
Qty: 30 TABLET | Refills: 0 | Status: SHIPPED | OUTPATIENT
Start: 2021-04-05 | End: 2021-08-09

## 2021-04-05 RX ORDER — DIPHENOXYLATE HYDROCHLORIDE AND ATROPINE SULFATE 2.5; .025 MG/1; MG/1
TABLET ORAL
COMMUNITY
Start: 2021-01-13 | End: 2021-09-14

## 2021-04-05 ASSESSMENT — FIBROSIS 4 INDEX: FIB4 SCORE: 1.15

## 2021-04-06 ENCOUNTER — OFFICE VISIT (OUTPATIENT)
Dept: SLEEP MEDICINE | Facility: MEDICAL CENTER | Age: 81
End: 2021-04-06
Payer: MEDICARE

## 2021-04-06 VITALS
HEIGHT: 65 IN | SYSTOLIC BLOOD PRESSURE: 138 MMHG | DIASTOLIC BLOOD PRESSURE: 76 MMHG | WEIGHT: 191 LBS | OXYGEN SATURATION: 91 % | HEART RATE: 59 BPM | RESPIRATION RATE: 16 BRPM | BODY MASS INDEX: 31.82 KG/M2

## 2021-04-06 DIAGNOSIS — I10 ESSENTIAL HYPERTENSION: Chronic | ICD-10-CM

## 2021-04-06 DIAGNOSIS — Z78.9 NONSMOKER: ICD-10-CM

## 2021-04-06 DIAGNOSIS — G47.33 OBSTRUCTIVE SLEEP APNEA: ICD-10-CM

## 2021-04-06 PROCEDURE — 99213 OFFICE O/P EST LOW 20 MIN: CPT | Performed by: NURSE PRACTITIONER

## 2021-04-06 ASSESSMENT — FIBROSIS 4 INDEX: FIB4 SCORE: 1.15

## 2021-04-06 NOTE — PROGRESS NOTES
"Chief Complaint   Patient presents with   • Muscle Pain     Right shoulder blade and down to med arm. Present for about 9 days. New \"rebounder\" trampoline and thats when pain started. Has been taking 3 600mg ibuprofen per day. Not present at night, starts about 30 mins after waking.       HPI:  Patient is a 80 y.o. female established patient who presents today for evaluation of new right shoulder blade/right deltoid area pain that started approximately 9 days ago. Patient recently started using a rebounder trampoline for exercise and has been holding onto bar for stability. She is right hand dominant and denies recent falls nor trauma to right upper extremity. She has been having muscle pain and tightness starting at level of right shoulder blade and extending down front/side of right arm to level of elbow during daytime hours, and she correlates this to using her new rebounder. She has been applying heat and has taken 600 mg Ibuprofen tabs at home without symptom resolution. She is able to use her right upper extremity without any major limitations and is otherwise doing well. She is aware of overdue screening mammogram and Medicare Annual Wellness visit and will update these matters when she returns from upcoming family  out of state.     Patient Active Problem List    Diagnosis Date Noted   • Epidermal cyst 10/16/2018   • Seborrheic keratosis 10/16/2018   • Microscopic colitis 2017   • Essential hypertension 2015   • Obesity (BMI 30.0-34.9) 2015   • Mixed hyperlipidemia with apolipoprotein E2 variant 2015   • Insulin resistance 2015   • Metabolic syndrome 2015   • Sinus bradycardia 2015   • Eczema 10/14/2014   • Abnormal biliary HIDA scan 10/14/2014   • Osteoporosis, post-menopausal 10/14/2014   • Environmental allergies 2014   • Vitamin D deficiency disease 2013   • Dupuytren's contracture of both hands 2013   • Degenerative joint disease  " "      Past medical, surgical, family, and social history was reviewed and updated in Epic chart by me today.     Medications and allergies reviewed and updated in Epic chart by me today.     ROS:  Pertinent positives listed above in HPI. All other systems have been reviewed and are negative.    PE:   /70 (BP Location: Left arm, Patient Position: Sitting, BP Cuff Size: Adult)   Pulse 65   Temp 36.8 °C (98.2 °F) (Temporal)   Resp 17   Ht 1.638 m (5' 4.5\")   Wt 83 kg (182 lb 15.7 oz)   LMP 2002   SpO2 96%   BMI 30.92 kg/m²   Vital signs reviewed with patient.     Gen: Well developed; well nourished; no acute distress; age appropriate appearance   RUE: no gross deformities/ patient has full ROM of arm and hand/ distinct areas of muscle tightness and spasm noted along medial border of right scapula as well as in central portion of scapula region; horizontal adduction of arm causes patient to feel tightness in right scapula/shoulder/deltoid areas   Lower extremities: No peripheral edema b/l LE extremities/ no clubbing nor cyanosis noted  Skin: Warm and dry; no rashes noted   Neuro: No focal deficits noted   Psych: AAOx4; mood and affect are appropriate    A/P:  1. Trapezius muscle spasm  New condition likely originating from holding onto rebounder trampoline while exercising. Recommend patient avoid rebounder work until condition improves, ok to do gentle stretching (examples provided during visit), use low dose Flexeril for spasm control, ok to continue Ibuprofen use, and keep pending appointment Wednesday for acupuncture and cupping. Patient will have her therapist focus on affected area, and I will make referral to PT at [facility] to help patient resolve this issue. New RX sent to pharmacy, and patient encouraged to follow up with me for her annual visit when she returns from family  out of state.   - cyclobenzaprine (FLEXERIL) 5 mg tablet; Take 1 tablet by mouth 3 times a day as needed " for Muscle Spasms.  Dispense: 30 tablet; Refill: 0  - REFERRAL TO PHYSICAL THERAPY

## 2021-04-06 NOTE — PROGRESS NOTES
Chief Complaint   Patient presents with   • Apnea     1/4/2021       HPI:  Kacie Rubalcava is a 80 y.o. year old female here today for follow-up on SHIRA.  Office visit 1/4/2021 with QUINCY Rebollar.    Previously using CPAP 11 cm.  At last office visit adjusted to auto CPAP 11 to 15 cm.  Mask fit suggested.  Compliance report today 3/7/2021 through 4/5/2021 notes 100% compliance, average nightly 6 hours 10 minutes, minimal mask leak with reduced AHI 3.1/h.  Reviewed finds with patient.  This is much improved.  She understands the benefit of therapy but still has some sleepiness and dozes in a chair for avg of 10min upright in chair but this may not be every day. She denies AM headaches. She notes waking atleast 1x at night to use restroom and falls back asleep under 30min but if she is laying too long she will get up. She is motivated to continue with therapy.  Her partner has been on therapy for >10yrs and does well with it.   Since last OV she did have colitis but not under control. She denies cardiac or respiratory symptoms. She denies GERD. Mild seasonal allergies.     Sleep history:  PSG split night study from 9/22/20 indicated severe obstructive sleep apnea, AHI: 83/h with desaturations to 58% SpO2. Successful CPAP titration at 11 cm of water with reduced AHI of 0 and O2 mean 93%.      Patient received the CPAP in November of 2020.     ROS: As per HPI and otherwise negative if not stated.    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 at [facility]   • DILATION AND CURETTAGE  3/13/2015    Performed by Alexandria Ke[facility]an M.D. at [facility]   • BREAST BIOPSY  11/13/08    Performed by ISRAEL HICKS at [facility] X3   • COLON RESECTION  2000    polyps   • APPENDECTOMY     • OTHER NEUROLOGICAL SURG     • TONSILLECTOMY     • US-NEEDLE CORE BX-BREAST PANEL         Family History   Problem Relation Age of Onset   • Lung Disease Mother    • Cancer Mother         lung cancer   • Cancer Paternal Aunt         breast       Social History     Socioeconomic History   • Marital status: Single     Spouse name: Not on file   • Number of children: Not on file   • Years of education: Not on file   • Highest education level: Not on file   Occupational History   • Not on file   Tobacco Use   • Smoking status: Never Smoker   • Smokeless tobacco: Never Used   Substance and Sexual Activity   • Alcohol use: Yes     Comment: occ   • Drug use: No   • Sexual activity: Yes     Partners: Male   Other Topics Concern   • Not on file   Social History Narrative   • Not on file     Social Determinants of Health     Financial Resource Strain:    • Difficulty of Paying Living Expenses:    Food Insecurity:    • Worried About Running Out of Food in the Last Year:    • Ran Out of Food in the Last Year:    Transportation Needs:    • Lack of Transportation (Medical):    • Lack of Transportation (Non-Medical):    Physical Activity:    • Days of Exercise per Week:    • Minutes of Exercise per Session:    Stress:    • Feeling of Stress :    Social Connections:    • Frequency of Communication with Friends and Family:    • Frequency of Social Gatherings with Friends and Family:    • Attends [employer] Services:    • Active Member of Clubs or Organizations:    • Attends Club or Organization Meetings:    •  "Marital Status:    Intimate Partner Violence:    • Fear of Current or Ex-Partner:    • Emotionally Abused:    • Physically Abused:    • Sexually Abused:        Allergies as of 04/06/2021 - Reviewed 04/06/2021   Allergen Reaction Noted   • Food Anaphylaxis and Vomiting 10/09/2011   • Sulfa drugs Anaphylaxis 11/30/2008   • Ace inhibitors Cough 02/27/2015   • Augmentin Vomiting 05/11/2018   • Nifedipine Swelling 02/27/2015        Vitals:  /76 (BP Location: Left arm, Patient Position: Sitting, BP Cuff Size: Adult)   Pulse (!) 59   Resp 16   Ht 1.638 m (5' 4.5\")   Wt 86.6 kg (191 lb)   SpO2 91%     Current medications as of today   Current Outpatient Medications   Medication Sig Dispense Refill   • cyclobenzaprine (FLEXERIL) 5 mg tablet Take 1 tablet by mouth 3 times a day as needed for Muscle Spasms. 30 tablet 0   • diphenoxylate-atropine (LOMOTIL) 2.5-0.025 MG Tab TAKE 1 TAB BY MOUTH 4 TIMES A DAY AS NEEDED FOR DIARRHEA FOR UP TO 7 DAYS.     • budesonide (ENTOCORT EC) 3 MG Cap DR Particles capsule TAKE 3 CAPS BY MOUTH EVERY MORNING FOR 60 DAYS. 270 capsule 1   • atenolol (TENORMIN) 25 MG Tab TAKE 1 TABLET BY MOUTH EVERY DAY 90 Tab 3   • lovastatin (MEVACOR) 10 MG tablet TAKE 1 TABLET BY MOUTH EVERYDAY AT BEDTIME 90 Tab 3   • losartan (COZAAR) 100 MG Tab Take 1 Tab by mouth every day. 90 Tab 4   • cetirizine (KLS ALLER-AMERICO) 10 MG Tab Take 10 mg by mouth every bedtime.     • Cyanocobalamin (B-12) 1000 MCG Cap Take 1 Tab by mouth every day.     • Loperamide HCl (IMODIUM PO) Take 1 Tab by mouth as needed (For diarrhea).     • Cholecalciferol (VITAMIN D3) 5000 UNITS CAPS Take 2,000 Units by mouth every day.     • Melatonin 5 MG TABS Take 1 Tab by mouth at bedtime as needed.     • Coral Calcium 1000 (390 CA) MG TABS Take 1 Each by mouth every day. 60 Each      No current facility-administered medications for this visit.         Physical Exam:   Gen:           Alert and oriented, No apparent distress. Mood and " affect appropriate, normal interaction with examiner.  Eyes:          PERRL, EOM intact, sclere white, conjunctive moist. Glasses.  Ears:          Not examined.   Hearing:     Grossly intact.  Nose:          Normal, no lesions or deformities.  Dentition:    mask  Oropharynx:   mask  Mallampati Classification: mask  Neck:        Supple, trachea midline, no masses.  Respiratory Effort: No intercostal retractions or use of accessory muscles.   Lung Auscultation:      Clear to auscultation bilaterally; no rales, rhonchi or wheezing.  CV:            Bradycardia. No murmurs, rubs or gallops.  Abd:           Not examined.   Lymphadenopathy: Not examined.  Gait and Station: Normal.  Digits and Nails: No clubbing, cyanosis, petechiae, or nodes.   Cranial Nerves: II-XII grossly intact.  Skin:        No rashes, lesions or ulcers noted.               Ext:           No cyanosis or edema.      Assessment:  1. Obstructive sleep apnea     2. BMI 32.0-32.9,adult  Height And Weight   3. Essential hypertension     4. Nonsmoker         Immunizations:    Flu:10/2020  Pneumovax 23:2010  Prevnar 13:2014  COVID-19: 2/5/21, 1/15/21    Plan:  1. SHIRA is much improved on therapy but she continues to acclimate to therapy. She is comfortable with pressures and nasal mask. She is using a chin strap but will try cpap tape to lips.  Continue APAP nightly.  2. Discussed sleep hygiene and handout provided.  3. Encourage weight loss through diet/exercise  4. F/u in 4 mos for second compliance check, sooner if needed.    Please note that this dictation was created using voice recognition software. I have made every reasonable attempt to correct obvious errors, but it is possible there are errors of grammar and possibly content that I did not discover before finalizing the note.

## 2021-05-05 ENCOUNTER — OFFICE VISIT (OUTPATIENT)
Dept: INTERNAL MEDICINE | Facility: IMAGING CENTER | Age: 81
End: 2021-05-05
Payer: MEDICARE

## 2021-05-05 VITALS
SYSTOLIC BLOOD PRESSURE: 110 MMHG | OXYGEN SATURATION: 94 % | BODY MASS INDEX: 31.82 KG/M2 | WEIGHT: 191 LBS | TEMPERATURE: 98.5 F | HEART RATE: 61 BPM | DIASTOLIC BLOOD PRESSURE: 62 MMHG | RESPIRATION RATE: 17 BRPM | HEIGHT: 65 IN

## 2021-05-05 DIAGNOSIS — E55.9 VITAMIN D DEFICIENCY: ICD-10-CM

## 2021-05-05 DIAGNOSIS — R53.83 OTHER FATIGUE: ICD-10-CM

## 2021-05-05 DIAGNOSIS — J30.2 SEASONAL ALLERGIES: ICD-10-CM

## 2021-05-05 DIAGNOSIS — I10 ESSENTIAL HYPERTENSION: ICD-10-CM

## 2021-05-05 DIAGNOSIS — E78.2 MIXED HYPERLIPIDEMIA WITH APOLIPOPROTEIN E2 VARIANT: ICD-10-CM

## 2021-05-05 DIAGNOSIS — R80.9 MICROALBUMINURIA: ICD-10-CM

## 2021-05-05 DIAGNOSIS — Z00.00 HEALTH CARE MAINTENANCE: ICD-10-CM

## 2021-05-05 DIAGNOSIS — R73.9 HYPERGLYCEMIA: ICD-10-CM

## 2021-05-05 PROCEDURE — 99214 OFFICE O/P EST MOD 30 MIN: CPT | Mod: 25 | Performed by: FAMILY MEDICINE

## 2021-05-05 RX ORDER — CETIRIZINE HYDROCHLORIDE 10 MG/1
10 TABLET ORAL DAILY
COMMUNITY
End: 2021-08-09

## 2021-05-05 RX ORDER — AZELASTINE 1 MG/ML
1 SPRAY, METERED NASAL 2 TIMES DAILY
Qty: 30 ML | Refills: 6 | Status: SHIPPED | OUTPATIENT
Start: 2021-05-05 | End: 2022-05-24

## 2021-05-05 RX ORDER — TRIAMCINOLONE ACETONIDE 40 MG/ML
40 INJECTION, SUSPENSION INTRA-ARTICULAR; INTRAMUSCULAR ONCE
Status: COMPLETED | OUTPATIENT
Start: 2021-05-05 | End: 2021-05-05

## 2021-05-05 RX ORDER — FLUTICASONE PROPIONATE 50 MCG
1 SPRAY, SUSPENSION (ML) NASAL 2 TIMES DAILY
COMMUNITY
End: 2021-08-09

## 2021-05-05 RX ADMIN — TRIAMCINOLONE ACETONIDE 40 MG: 40 INJECTION, SUSPENSION INTRA-ARTICULAR; INTRAMUSCULAR at 15:14

## 2021-05-05 ASSESSMENT — PATIENT HEALTH QUESTIONNAIRE - PHQ9: CLINICAL INTERPRETATION OF PHQ2 SCORE: 0

## 2021-05-05 ASSESSMENT — FIBROSIS 4 INDEX: FIB4 SCORE: 1.15

## 2021-05-05 NOTE — PROGRESS NOTES
Chief Complaint   Patient presents with   • Seasonal Allergies     x2 weeks       HPI:  Patient is a 80 y.o. female established patient who presents today for evaluation of new uncontrolled seasonal allergies present for the past two weeks. She reports sudden onset of uncontrolled clear nasal drainage, itchy eyes, intense sneezing episodes now causing her abdomen to hurt, and general malaise without associated N/V/D/F/bowel or bladder changes. She has been using Zyrtect, Flonase BID, medicated OTC eye drops, and natural antihistamine without relief and is seeking other options. She continues to attend PT and massage/cupping sessions to treat right shoulder/trap pain as well as LLE pain and discomfort - reports both are improving slowly. She is due for annual fasting labs and Medicare Wellness visit when feeling better from her acute seasonal allergy exacerbation.     Patient Active Problem List    Diagnosis Date Noted   • Epidermal cyst 10/16/2018   • Seborrheic keratosis 10/16/2018   • Microscopic colitis 09/05/2017   • Essential hypertension 12/01/2015   • Obesity (BMI 30.0-34.9) 04/09/2015   • Mixed hyperlipidemia with apolipoprotein E2 variant 04/09/2015   • Insulin resistance 04/09/2015   • Metabolic syndrome 04/09/2015   • Sinus bradycardia 03/16/2015   • Eczema 10/14/2014   • Abnormal biliary HIDA scan 10/14/2014   • Osteoporosis, post-menopausal 10/14/2014   • Environmental allergies 04/18/2014   • Vitamin D deficiency disease 11/13/2013   • Dupuytren's contracture of both hands 05/22/2013   • Degenerative joint disease        Past medical, surgical, family, and social history was reviewed and updated in Epic chart by me today.     Medications and allergies reviewed and updated in Epic chart by me today.     ROS:  Pertinent positives listed above in HPI. All other systems have been reviewed and are negative.    PE:   /62 (BP Location: Left arm, Patient Position: Sitting, BP Cuff Size: Adult)   Pulse  "61   Temp 36.9 °C (98.5 °F) (Temporal)   Resp 17   Ht 1.638 m (5' 4.5\")   Wt 86.6 kg (191 lb)   LMP 01/01/2002   SpO2 94%   BMI 32.28 kg/m²   Vital signs reviewed with patient.     Gen: Well developed; well nourished; no acute distress; non toxic appearance   HEENT: Normocephalic; atraumatic; PEERLA b/l; sclera clear b/l; b/l external auditory canals WNL; b/l TM WNL; nares patent; oropharynx clear; mask in place  Neck: No adenopathy; no thyromegaly  CV: Regular rate and rhythm; S1/ S2 present; no murmur, gallop or rub noted  Pulm: No respiratory distress; clear to ascultation b/l; no wheezing or stridor noted b/l   Extremities: No significant new peripheral edema b/l LE extremities/ no clubbing nor cyanosis noted  Skin: Warm and dry; no rashes noted   Neuro: No focal deficits noted   Psych: AAOx4; mood and affect are appropriate    A/P:  1. Seasonal allergies  Uncontrolled despite appropriate OTC medication use. Recommend patient continue daily Zyrtec or similar product use, d/c Flonase use, start Astelin use BID, change to Patanol OTC eye drops, and follow clinical response. Kenalog injection administered at visit today for additional treatment, and patient encouraged to contact my office if condition does not improve with treatment plans detailed above. Paper RX provided to patient per her request.   - azelastine (ASTELIN) 137 MCG/SPRAY nasal spray; Administer 1 Spray into affected nostril(S) 2 times a day.  Dispense: 30 mL; Refill: 6  - triamcinolone acetonide (KENALOG-40) injection 40 mg    2. Health care maintenance  Patient is aware of need for annual fasting labs and Medicare wellness visit once she is feeling better overall.      "

## 2021-05-12 DIAGNOSIS — J30.2 SEASONAL ALLERGIES: ICD-10-CM

## 2021-06-15 ENCOUNTER — HOSPITAL ENCOUNTER (OUTPATIENT)
Facility: MEDICAL CENTER | Age: 81
End: 2021-06-15
Attending: FAMILY MEDICINE
Payer: MEDICARE

## 2021-06-15 ENCOUNTER — NON-PROVIDER VISIT (OUTPATIENT)
Dept: INTERNAL MEDICINE | Facility: IMAGING CENTER | Age: 81
End: 2021-06-15
Payer: MEDICARE

## 2021-06-15 DIAGNOSIS — R53.83 OTHER FATIGUE: ICD-10-CM

## 2021-06-15 DIAGNOSIS — E55.9 VITAMIN D DEFICIENCY: ICD-10-CM

## 2021-06-15 DIAGNOSIS — R80.9 MICROALBUMINURIA: ICD-10-CM

## 2021-06-15 DIAGNOSIS — E78.2 MIXED HYPERLIPIDEMIA WITH APOLIPOPROTEIN E2 VARIANT: ICD-10-CM

## 2021-06-15 DIAGNOSIS — I10 ESSENTIAL HYPERTENSION: ICD-10-CM

## 2021-06-15 DIAGNOSIS — R73.9 HYPERGLYCEMIA: ICD-10-CM

## 2021-06-15 LAB
25(OH)D3 SERPL-MCNC: 35 NG/ML (ref 30–100)
ALBUMIN SERPL BCP-MCNC: 3.7 G/DL (ref 3.2–4.9)
ALBUMIN/GLOB SERPL: 1.3 G/DL
ALP SERPL-CCNC: 80 U/L (ref 30–99)
ALT SERPL-CCNC: 19 U/L (ref 2–50)
ANION GAP SERPL CALC-SCNC: 11 MMOL/L (ref 7–16)
AST SERPL-CCNC: 22 U/L (ref 12–45)
BASOPHILS # BLD AUTO: 0.7 % (ref 0–1.8)
BASOPHILS # BLD: 0.06 K/UL (ref 0–0.12)
BILIRUB SERPL-MCNC: 0.4 MG/DL (ref 0.1–1.5)
BUN SERPL-MCNC: 30 MG/DL (ref 8–22)
CALCIUM SERPL-MCNC: 9 MG/DL (ref 8.5–10.5)
CHLORIDE SERPL-SCNC: 106 MMOL/L (ref 96–112)
CHOLEST SERPL-MCNC: 147 MG/DL (ref 100–199)
CO2 SERPL-SCNC: 24 MMOL/L (ref 20–33)
CREAT SERPL-MCNC: 1.04 MG/DL (ref 0.5–1.4)
CREAT UR-MCNC: 132.13 MG/DL
EOSINOPHIL # BLD AUTO: 0.2 K/UL (ref 0–0.51)
EOSINOPHIL NFR BLD: 2.5 % (ref 0–6.9)
ERYTHROCYTE [DISTWIDTH] IN BLOOD BY AUTOMATED COUNT: 45.2 FL (ref 35.9–50)
EST. AVERAGE GLUCOSE BLD GHB EST-MCNC: 134 MG/DL
GLOBULIN SER CALC-MCNC: 2.9 G/DL (ref 1.9–3.5)
GLUCOSE SERPL-MCNC: 123 MG/DL (ref 65–99)
HBA1C MFR BLD: 6.3 % (ref 4–5.6)
HCT VFR BLD AUTO: 43.4 % (ref 37–47)
HDLC SERPL-MCNC: 54 MG/DL
HGB BLD-MCNC: 14.2 G/DL (ref 12–16)
IMM GRANULOCYTES # BLD AUTO: 0.02 K/UL (ref 0–0.11)
IMM GRANULOCYTES NFR BLD AUTO: 0.2 % (ref 0–0.9)
LDLC SERPL CALC-MCNC: 75 MG/DL
LYMPHOCYTES # BLD AUTO: 2.79 K/UL (ref 1–4.8)
LYMPHOCYTES NFR BLD: 34.7 % (ref 22–41)
MCH RBC QN AUTO: 30.7 PG (ref 27–33)
MCHC RBC AUTO-ENTMCNC: 32.7 G/DL (ref 33.6–35)
MCV RBC AUTO: 93.7 FL (ref 81.4–97.8)
MICROALBUMIN UR-MCNC: 2.7 MG/DL
MICROALBUMIN/CREAT UR: 20 MG/G (ref 0–30)
MONOCYTES # BLD AUTO: 0.54 K/UL (ref 0–0.85)
MONOCYTES NFR BLD AUTO: 6.7 % (ref 0–13.4)
NEUTROPHILS # BLD AUTO: 4.44 K/UL (ref 2–7.15)
NEUTROPHILS NFR BLD: 55.2 % (ref 44–72)
NRBC # BLD AUTO: 0 K/UL
NRBC BLD-RTO: 0 /100 WBC
PLATELET # BLD AUTO: 274 K/UL (ref 164–446)
PMV BLD AUTO: 10.5 FL (ref 9–12.9)
POTASSIUM SERPL-SCNC: 4.1 MMOL/L (ref 3.6–5.5)
PROT SERPL-MCNC: 6.6 G/DL (ref 6–8.2)
RBC # BLD AUTO: 4.63 M/UL (ref 4.2–5.4)
SODIUM SERPL-SCNC: 141 MMOL/L (ref 135–145)
TRIGL SERPL-MCNC: 92 MG/DL (ref 0–149)
TSH SERPL DL<=0.005 MIU/L-ACNC: 1.68 UIU/ML (ref 0.38–5.33)
WBC # BLD AUTO: 8.1 K/UL (ref 4.8–10.8)

## 2021-06-15 PROCEDURE — 80053 COMPREHEN METABOLIC PANEL: CPT

## 2021-06-15 PROCEDURE — 84443 ASSAY THYROID STIM HORMONE: CPT

## 2021-06-15 PROCEDURE — 82043 UR ALBUMIN QUANTITATIVE: CPT

## 2021-06-15 PROCEDURE — 85025 COMPLETE CBC W/AUTO DIFF WBC: CPT

## 2021-06-15 PROCEDURE — 82306 VITAMIN D 25 HYDROXY: CPT

## 2021-06-15 PROCEDURE — 80061 LIPID PANEL: CPT

## 2021-06-15 PROCEDURE — 82570 ASSAY OF URINE CREATININE: CPT

## 2021-06-15 PROCEDURE — 83036 HEMOGLOBIN GLYCOSYLATED A1C: CPT

## 2021-06-21 ENCOUNTER — OFFICE VISIT (OUTPATIENT)
Dept: INTERNAL MEDICINE | Facility: IMAGING CENTER | Age: 81
End: 2021-06-21
Payer: MEDICARE

## 2021-06-21 VITALS
TEMPERATURE: 98.9 F | WEIGHT: 193 LBS | DIASTOLIC BLOOD PRESSURE: 62 MMHG | SYSTOLIC BLOOD PRESSURE: 122 MMHG | BODY MASS INDEX: 32.15 KG/M2 | OXYGEN SATURATION: 95 % | HEART RATE: 55 BPM | RESPIRATION RATE: 17 BRPM | HEIGHT: 65 IN

## 2021-06-21 DIAGNOSIS — J30.2 SEASONAL ALLERGIES: ICD-10-CM

## 2021-06-21 DIAGNOSIS — I10 ESSENTIAL HYPERTENSION: Chronic | ICD-10-CM

## 2021-06-21 DIAGNOSIS — Z00.00 ENCOUNTER FOR MEDICARE ANNUAL WELLNESS EXAM: ICD-10-CM

## 2021-06-21 DIAGNOSIS — E66.9 OBESITY (BMI 30.0-34.9): Chronic | ICD-10-CM

## 2021-06-21 DIAGNOSIS — E78.2 MIXED HYPERLIPIDEMIA WITH APOLIPOPROTEIN E2 VARIANT: Chronic | ICD-10-CM

## 2021-06-21 DIAGNOSIS — E55.9 VITAMIN D DEFICIENCY: Chronic | ICD-10-CM

## 2021-06-21 DIAGNOSIS — Z91.81 RISK FOR FALLS: ICD-10-CM

## 2021-06-21 DIAGNOSIS — E88.819 INSULIN RESISTANCE: Chronic | ICD-10-CM

## 2021-06-21 DIAGNOSIS — Z12.31 ENCOUNTER FOR SCREENING MAMMOGRAM FOR BREAST CANCER: ICD-10-CM

## 2021-06-21 DIAGNOSIS — K52.838 OTHER MICROSCOPIC COLITIS: ICD-10-CM

## 2021-06-21 DIAGNOSIS — R00.1 SINUS BRADYCARDIA: Chronic | ICD-10-CM

## 2021-06-21 DIAGNOSIS — M81.0 OSTEOPOROSIS, POST-MENOPAUSAL: Chronic | ICD-10-CM

## 2021-06-21 DIAGNOSIS — G47.33 OSA (OBSTRUCTIVE SLEEP APNEA): ICD-10-CM

## 2021-06-21 PROCEDURE — G0439 PPPS, SUBSEQ VISIT: HCPCS | Performed by: FAMILY MEDICINE

## 2021-06-21 RX ORDER — BEPOTASTINE BESILATE 15 MG/ML
SOLUTION/ DROPS OPHTHALMIC
COMMUNITY
End: 2021-06-21 | Stop reason: SDUPTHER

## 2021-06-21 RX ORDER — BEPOTASTINE BESILATE 15 MG/ML
1 SOLUTION/ DROPS OPHTHALMIC 2 TIMES DAILY
Qty: 10 ML | Refills: 6 | Status: SHIPPED | OUTPATIENT
Start: 2021-06-21 | End: 2022-05-24

## 2021-06-21 RX ORDER — LOSARTAN POTASSIUM 50 MG/1
TABLET ORAL
Qty: 180 TABLET | Refills: 3 | Status: SHIPPED | OUTPATIENT
Start: 2021-06-21 | End: 2022-06-19

## 2021-06-21 ASSESSMENT — ENCOUNTER SYMPTOMS: GENERAL WELL-BEING: GOOD

## 2021-06-21 ASSESSMENT — ACTIVITIES OF DAILY LIVING (ADL): BATHING_REQUIRES_ASSISTANCE: 0

## 2021-06-21 ASSESSMENT — FIBROSIS 4 INDEX: FIB4 SCORE: 1.49

## 2021-06-21 ASSESSMENT — PATIENT HEALTH QUESTIONNAIRE - PHQ9: CLINICAL INTERPRETATION OF PHQ2 SCORE: 0

## 2021-06-22 DIAGNOSIS — Z91.81 HISTORY OF FALL: ICD-10-CM

## 2021-06-22 DIAGNOSIS — R07.81 RIB PAIN ON RIGHT SIDE: ICD-10-CM

## 2021-06-22 NOTE — PROGRESS NOTES
CC:   Medicare Annual Wellness Visit    HPI:  Kacie is a 80 y.o. female here for her Medicare Annual Wellness Visit and to review labs done 6/15/2021. She has established care with Allergy and Immunology in [place] and has upcoming allergy testing 7/13/2021. She continues to use Astelin nasal spray and Bepreve eye drops for symptom management and has had a very difficult year managing seasonal allergies. She is due for annual screening mammogram and continues to sloan obesity. She has resumed water walking/swimming frequently for exercise and enjoys it greatly. She has a history of microscopic colitis managed by her GI team, and chronic essential HTN managed with ongoing daily medication use. She also has history of chronic vitamin D deficiency with daily vitamin D supplementation. She has chronic metabolic syndrome/insulin resistance without DM diagnosis, and known osteoporosis - overdue for Reclast infusion. She fell last week on maple tree pod while walking her small dog and suffered skin abrasions. She has residual pain under right bra line but denies skin bruising nor trouble breathing. She endorses 100% medication compliance and is in good spirits.     Patient Active Problem List    Diagnosis Date Noted   • Risk for falls 06/21/2021   • Epidermal cyst 10/16/2018   • Seborrheic keratosis 10/16/2018   • Microscopic colitis 09/05/2017   • Essential hypertension 12/01/2015   • Obesity (BMI 30.0-34.9) 04/09/2015   • Mixed hyperlipidemia with apolipoprotein E2 variant 04/09/2015   • Insulin resistance 04/09/2015   • Metabolic syndrome 04/09/2015   • Sinus bradycardia 03/16/2015   • Eczema 10/14/2014   • Abnormal biliary HIDA scan 10/14/2014   • Osteoporosis, post-menopausal 10/14/2014   • Environmental allergies 04/18/2014   • Vitamin D deficiency disease 11/13/2013   • Dupuytren's contracture of both hands 05/22/2013   • Degenerative joint disease      Current Outpatient Medications   Medication Sig Dispense  Refill   • losartan (COZAAR) 50 MG Tab TAKE 2 TABLETS BY MOUTH EVERY  tablet 3   • Bepotastine Besilate (BEPREVE) 1.5 % Solution Administer 1 Drop into affected eye(s) 2 times a day. 10 mL 6   • lovastatin (MEVACOR) 10 MG tablet TAKE 1 TABLET BY MOUTH EVERYDAY AT BEDTIME 90 tablet 0   • azelastine (ASTELIN) 137 MCG/SPRAY nasal spray Administer 1 Spray into affected nostril(S) 2 times a day. 30 mL 6   • cetirizine (ZYRTEC) 10 MG Tab Take 10 mg by mouth every day.     • fluticasone (FLONASE) 50 MCG/ACT nasal spray Administer 1 Spray into affected nostril(S) 2 times a day.     • cyclobenzaprine (FLEXERIL) 5 mg tablet Take 1 tablet by mouth 3 times a day as needed for Muscle Spasms. 30 tablet 0   • diphenoxylate-atropine (LOMOTIL) 2.5-0.025 MG Tab TAKE 1 TAB BY MOUTH 4 TIMES A DAY AS NEEDED FOR DIARRHEA FOR UP TO 7 DAYS.     • atenolol (TENORMIN) 25 MG Tab TAKE 1 TABLET BY MOUTH EVERY DAY 90 Tab 3   • losartan (COZAAR) 100 MG Tab Take 1 Tab by mouth every day. 90 Tab 4   • Cyanocobalamin (B-12) 1000 MCG Cap Take 1 Tab by mouth every day.     • Loperamide HCl (IMODIUM PO) Take 1 Tab by mouth as needed (For diarrhea).     • Cholecalciferol (VITAMIN D3) 5000 UNITS CAPS Take 2,000 Units by mouth every day.     • Melatonin 5 MG TABS Take 1 Tab by mouth at bedtime as needed.     • Coral Calcium 1000 (390 CA) MG TABS Take 1 Each by mouth every day. 60 Each      No current facility-administered medications for this visit.      Current supplements: see MAR  Chronic narcotic pain medicines: no  Allergies: Food, Sulfa drugs, Ace inhibitors, Augmentin, and Nifedipine  Exercise: water walks/ swims  Current social contact/activities: yes  Current mood: good  Advance Directive on file: no    Screening:  Depression Screening    Little interest or pleasure in doing things?  0 - not at all  Feeling down, depressed , or hopeless? 0 - not at all  Patient Health Questionnaire Score: 0     Screening for Cognitive Impairment    Three  Minute Recall (captain, garden, picture) 3/3    Omkar clock face with all 12 numbers and set the hands to show 5 past 8.  Yes    Cognitive concerns identified deferred for follow up unless specifically addressed in assessment and plan.    Fall Risk Assessment    Has the patient had two or more falls in the last year or any fall with injury in the last year?  Yes    Safety Assessment    Throw rugs on floor.  No  Handrails on all stairs.  Yes  Good lighting in all hallways.  Yes  Difficulty hearing.  No  Patient counseled about all safety risks that were identified.    Functional Assessment ADLs    Are there any barriers preventing you from cooking for yourself or meeting nutritional needs?  No.    Are there any barriers preventing you from driving safely or obtaining transportation?  No.    Are there any barriers preventing you from using a telephone or calling for help?  No.    Are there any barriers preventing you from shopping?  No.    Are there any barriers preventing you from taking care of your own finances?  No.    Are there any barriers preventing you from managing your medications?  No.    Are there any barriers preventing you from showering, bathing or dressing yourself?  No.    Are you currently engaging in any exercise or physical activity?  Yes.     What is your perception of your health?  Good.      Health Maintenance Summary                MAMMOGRAM Overdue 12/17/2019      Done 12/17/2018 MA-SCREENING MAMMO BILAT W/TOMOSYNTHESIS W/CAD     Patient has more history with this topic...    Annual Wellness Visit Next Due 6/22/2022      Done 6/21/2021 Visit Dx: Encounter for Medicare annual wellness exam     Patient has more history with this topic...    IMM DTaP/Tdap/Td Vaccine Next Due 5/22/2023      Done 5/22/2013 Imm Admin: Tdap Vaccine    BONE DENSITY Next Due 12/2/2024      Done 12/2/2019 DS-BONE DENSITY STUDY (DEXA)     Patient has more history with this topic...          Patient Care Team:  Ally TOMLINSON  MARIELY Barroso. as PCP - General (Family Medicine)  Preferred home care  as Respiratory Therapist      Social History     Tobacco Use   • Smoking status: Never Smoker   • Smokeless tobacco: Never Used   Vaping Use   • Vaping Use: Never used   Substance Use Topics   • Alcohol use: Yes     Comment: occ   • Drug use: No     Family History   Problem Relation Age of Onset   • Lung Disease Mother    • Cancer Mother         lung cancer   • Cancer Paternal Aunt         breast     She  has a past medical history of Anxiety, Arthritis (03/12/15), Chickenpox, Degenerative joint disease, Epidermal cyst (10/16/2018), Dominican measles, High cholesterol, HTN (hypertension), benign, Hyperlipemia, Hypertension, Microscopic colitis (2010), Neck pain on left side (5/31/2017), Osteoporosis, Other specified symptom associated with female genital organs, Seborrheic keratosis (10/16/2018), and Snoring. She also has no past medical history of Anesthesia, Cold, or Dental disorder.   Past Surgical History:   Procedure Laterality Date   • HYSTEROSCOPY WITH VIDEO DIAGNOSTIC  3/13/2015    Performed by Alexandria Keenan M.D. at SURGERY SAME DAY [facility] ORS   • DILATION AND CURETTAGE  3/13/2015    Performed by Alexandria Keenan M.D. at SURGERY SAME DAY [facility]S   • BREAST BIOPSY  11/13/08    Performed by ISRAEL HICKS at SURGERY SAME DAY [facility] ORS X3   • COLON RESECTION  2000    polyps   • APPENDECTOMY     • OTHER NEUROLOGICAL SURG     • TONSILLECTOMY     • US-NEEDLE CORE BX-BREAST PANEL         ROS:    All positives noted in HPI. All others reviewed and are negative.    Ostomy or other tubes or amputations: no  Chronic oxygen use: uses CPAP  Last eye exam: UTD per pt report  : denies urinary incontinence; does not interfere with ADLs/ sleep  Gait: stable  Problems with balance/ difficulty walking: recent fall  Hearing: adequate  Dentition: adequate     Lab results 6/15/2021 reviewed with patient at visit today.     Exam:   /62 (BP  "Location: Left arm, Patient Position: Sitting, BP Cuff Size: Adult)   Pulse (!) 55   Temp 37.2 °C (98.9 °F) (Temporal)   Resp 17   Ht 1.651 m (5' 5\")   Wt 87.5 kg (193 lb)   SpO2 95%  Body mass index is 32.12 kg/m².    Gen: Well developed; well nourished; no acute distress; age appropriate appearance   HEENT: Normocephalic; atraumatic; PEERLA b/l; sclera clear b/l; b/l external auditory canals WNL; b/l TM WNL; nares patent; oropharynx clear; mask in place  Neck: No adenopathy; no thyromegaly  CV: Regular rate and rhythm; S1/ S2 present; no murmur, gallop or rub noted  Pulm: No respiratory distress; clear to ascultation b/l; no wheezing or stridor noted b/l  Abd: Adequate bowel sounds noted; soft and nontender; no rebound, rigidity, nor distention  Extremities: No peripheral edema b/l LE extremities/ no clubbing nor cyanosis noted; abrasions noted on knees/legs  Skin: Warm and dry; no rashes noted   Neuro: No focal deficits noted; pt is able to get up out of chair unassisted and walk forward  Psych: AAOx4; mood and affect are appropriate    Assessment and Plan:  1. Risk for falls  Refer to HPI for details of recent fall.   - Patient identified as fall risk.  Appropriate orders and counseling given.  - Subsequent Annual Wellness Visit - Includes PPPS ()    2. Encounter for screening mammogram for breast cancer  Patient is due for screening mammogram and will call for imaging appointment in near future.   - MA-SCREENING MAMMO BILAT W/TOMOSYNTHESIS W/CAD; Future  - Subsequent Annual Wellness Visit - Includes PPPS ()    3. Seasonal allergies  Ongoing issue for patient being managed by Allergy and Immunology team. She has formal allergy testing on 7/13/2021 and recommend patient continue daily oral control medication, Astelin and Bepreve use. New RX sent to pharmacy.   - Bepotastine Besilate (BEPREVE) 1.5 % Solution; Administer 1 Drop into affected eye(s) 2 times a day.  Dispense: 10 mL; Refill: 6  - " Subsequent Annual Wellness Visit - Includes PPPS ()    4. Osteoporosis, post-menopausal  Patient is well over due for Reclast infusion, and order will be faxed to [facility] [place]. Due to lack of treatment since 2019, will defer dexa scan to 2022 at the earliest. Patient has no history of related fracture at this time.   - Subsequent Annual Wellness Visit - Includes PPPS ()    5. Vitamin D deficiency disease  Stable/ recommend patient continue current Vitamin D supplementation for maintenance.   - Subsequent Annual Wellness Visit - Includes PPPS ()    6. Sinus bradycardia  Stable/ remains asymptomatic  - Subsequent Annual Wellness Visit - Includes PPPS ()    7. Obesity (BMI 30.0-34.9)  Ongoing issue for patient that she is well aware of. She has resumed regular water walking and swimming for exercise.   - Subsequent Annual Wellness Visit - Includes PPPS ()    8. Mixed hyperlipidemia with apolipoprotein E2 variant  Stable/ recommend patient continue current statin use  - Subsequent Annual Wellness Visit - Includes PPPS ()    9. Insulin resistance  Stable  - Subsequent Annual Wellness Visit - Includes PPPS ()    10. Essential hypertension  Stable/ well controlled with ongoing daily medication use.   - Subsequent Annual Wellness Visit - Includes PPPS ()    11. Other microscopic colitis  Stable/ managed by her GI team   - Subsequent Annual Wellness Visit - Includes PPPS ()    12. Encounter for Medicare annual wellness exam  Patient is stable and remains well informed about chronic medical conditions that need addition attention moving forward.   - Subsequent Annual Wellness Visit - Includes PPPS ()    13. SHIRA (obstructive sleep apnea)  Stable/ CPAP managed by [facility] team.      - Subsequent Annual Wellness Visit - Includes PPPS    ()    Services needed: no new services needed at this time  Health Care Screening: recommendations as per orders if  indicated.  Referrals offered: none  Counseling provided today:  · Prevent falls and reduce trip hazards; Secure or remove rugs if present   · Maintain working fire alarm and carbon monoxide detectors   · Engage in regular physical activity daily and social activities weekly as tolerated

## 2021-06-25 ENCOUNTER — OUTPATIENT INFUSION SERVICES (OUTPATIENT)
Dept: ONCOLOGY | Facility: MEDICAL CENTER | Age: 81
End: 2021-06-25
Attending: FAMILY MEDICINE
Payer: MEDICARE

## 2021-06-25 VITALS
HEIGHT: 64 IN | DIASTOLIC BLOOD PRESSURE: 78 MMHG | RESPIRATION RATE: 18 BRPM | SYSTOLIC BLOOD PRESSURE: 180 MMHG | BODY MASS INDEX: 32.97 KG/M2 | TEMPERATURE: 98.1 F | WEIGHT: 193.12 LBS | HEART RATE: 67 BPM | OXYGEN SATURATION: 98 %

## 2021-06-25 DIAGNOSIS — M81.0 OSTEOPOROSIS, POST-MENOPAUSAL: ICD-10-CM

## 2021-06-25 PROCEDURE — 700111 HCHG RX REV CODE 636 W/ 250 OVERRIDE (IP): Mod: JG | Performed by: FAMILY MEDICINE

## 2021-06-25 PROCEDURE — 96372 THER/PROPH/DIAG INJ SC/IM: CPT

## 2021-06-25 RX ADMIN — DENOSUMAB 60 MG: 60 INJECTION SUBCUTANEOUS at 13:39

## 2021-06-25 ASSESSMENT — FIBROSIS 4 INDEX: FIB4 SCORE: 1.49

## 2021-06-25 NOTE — PROGRESS NOTES
Pt arrived to [facility], ambulatory, for Prolia injection. Pt voices no complaints. Pt denies any recent or upcoming dental surgeries. Labs reviewed from 6/15, pt within parameters to treat today. Prolia injected into the R-back arm with no complications. Pt left [facility] with no s/sx of distress. Follow up appointment confirmed.

## 2021-07-02 ENCOUNTER — HOSPITAL ENCOUNTER (OUTPATIENT)
Dept: RADIOLOGY | Facility: MEDICAL CENTER | Age: 81
End: 2021-07-02
Attending: FAMILY MEDICINE
Payer: MEDICARE

## 2021-07-02 DIAGNOSIS — Z91.81 HISTORY OF FALL: ICD-10-CM

## 2021-07-02 DIAGNOSIS — R07.81 RIB PAIN ON RIGHT SIDE: ICD-10-CM

## 2021-07-02 PROCEDURE — 71101 X-RAY EXAM UNILAT RIBS/CHEST: CPT | Mod: RT

## 2021-07-25 DIAGNOSIS — M51.16 RADICULOPATHY DUE TO LUMBAR INTERVERTEBRAL DISC DISORDER: ICD-10-CM

## 2021-07-31 ENCOUNTER — HOSPITAL ENCOUNTER (OUTPATIENT)
Dept: RADIOLOGY | Facility: MEDICAL CENTER | Age: 81
End: 2021-07-31
Attending: FAMILY MEDICINE
Payer: MEDICARE

## 2021-07-31 DIAGNOSIS — M51.16 RADICULOPATHY DUE TO LUMBAR INTERVERTEBRAL DISC DISORDER: ICD-10-CM

## 2021-07-31 PROCEDURE — 72148 MRI LUMBAR SPINE W/O DYE: CPT | Mod: ME

## 2021-08-02 ENCOUNTER — HOSPITAL ENCOUNTER (OUTPATIENT)
Dept: RADIOLOGY | Facility: MEDICAL CENTER | Age: 81
End: 2021-08-02
Attending: FAMILY MEDICINE
Payer: MEDICARE

## 2021-08-02 DIAGNOSIS — Z12.31 ENCOUNTER FOR SCREENING MAMMOGRAM FOR BREAST CANCER: ICD-10-CM

## 2021-08-02 PROCEDURE — 77063 BREAST TOMOSYNTHESIS BI: CPT

## 2021-08-09 ENCOUNTER — SLEEP CENTER VISIT (OUTPATIENT)
Dept: SLEEP MEDICINE | Facility: MEDICAL CENTER | Age: 81
End: 2021-08-09
Payer: MEDICARE

## 2021-08-09 VITALS
SYSTOLIC BLOOD PRESSURE: 126 MMHG | RESPIRATION RATE: 16 BRPM | WEIGHT: 196 LBS | BODY MASS INDEX: 33.46 KG/M2 | HEIGHT: 64 IN | HEART RATE: 58 BPM | DIASTOLIC BLOOD PRESSURE: 74 MMHG | OXYGEN SATURATION: 96 %

## 2021-08-09 DIAGNOSIS — E78.2 MIXED DYSLIPIDEMIA: ICD-10-CM

## 2021-08-09 DIAGNOSIS — Z78.9 NONSMOKER: ICD-10-CM

## 2021-08-09 DIAGNOSIS — G47.33 OSA (OBSTRUCTIVE SLEEP APNEA): ICD-10-CM

## 2021-08-09 DIAGNOSIS — E66.9 OBESITY (BMI 30.0-34.9): Chronic | ICD-10-CM

## 2021-08-09 PROCEDURE — 99214 OFFICE O/P EST MOD 30 MIN: CPT | Performed by: NURSE PRACTITIONER

## 2021-08-09 RX ORDER — LOVASTATIN 10 MG/1
TABLET ORAL
Qty: 90 TABLET | Refills: 3 | Status: SHIPPED | OUTPATIENT
Start: 2021-08-09 | End: 2022-06-06

## 2021-08-09 ASSESSMENT — FIBROSIS 4 INDEX: FIB4 SCORE: 1.49

## 2021-08-09 NOTE — PROGRESS NOTES
Chief Complaint   Patient presents with   • Apnea     last seen 4/6/2021        HPI:  Kacie Rubalcava is a 80 y.o. year old female here today for follow-up on SHIRA.  Last OV 4/6/21    She is currently using auto CPAP 11 to 15 cm; [facility].  Compliance report 7/10/2021 through 8/8/2021 notes 100 to compliance, average nightly use 5 hours 13 minutes, moderate mask leak with reduced HI 2.0/h.  Reviewed finds with patient.  She tolerates mask and pressure overall denies any rash to her face.  She has noted since resuming CPAP therapy she is having sneezing throughout the day.  She does not feel it is worse when she first puts the device on her wakes up in the morning.  She notes undergoing allergy testing without any significant findings.  She has tried multiple OTC medications including Nasacort, 24-hour antihistamine, etc.  Her spouse is currently doing cleaning her device with what she believes is soap and water.  We reviewed possibly trying water with some white vinegar with good rinsing to see if this makes a change.  She denies any other cardiac or respiratory symptoms.  She notes having an air purifier at home and air conditioning with filters regularly change.  She feels her postnasal drip and sneezing has improved in the last month.  Patient had no sneezing during her exam.      Sleep history:  PSG split night study from 9/22/20 indicated severe obstructive sleep apnea, AHI: 83/h with desaturations to 58% SpO2. Successful CPAP titration at 11 cm of water with reduced AHI of 0 and O2 mean 93%.      Patient received the CPAP in November of 2020.      ROS: As per HPI and otherwise negative if not stated.    Past Medical History:   Diagnosis Date   • Anxiety    • Arthritis 03/12/15    generalized   • Chickenpox    • Degenerative joint disease    • Epidermal cyst 10/16/2018   • [place] measles    • High cholesterol    • HTN (hypertension), benign    • Hyperlipemia    • Hypertension    • Microscopic colitis 2010   •  Neck pain on left side 5/31/2017   • Osteoporosis    • Other specified symptom associated with female genital organs     post menopausal bleeding   • Seborrheic keratosis 10/16/2018   • Snoring        Past Surgical History:   Procedure Laterality Date   • HYSTEROSCOPY WITH VIDEO DIAGNOSTIC  3/13/2015    [facility]   • DILATION AND CURETTAGE  3/13/2015    Performed by Alexandria Keenan M.D. at [facility]   • BREAST BIOPSY  11/13/08    Performed by ISRAEL HICKS at [facility] X3   • COLON RESECTION  2000    polyps   • APPENDECTOMY     • OTHER NEUROLOGICAL SURG     • TONSILLECTOMY     • US-NEEDLE CORE BX-BREAST PANEL         Family History   Problem Relation Age of Onset   • Lung Disease Mother    • Cancer Mother         lung cancer   • Cancer Paternal Aunt         breast       Social History     Socioeconomic History   • Marital status: Single     Spouse name: Not on file   • Number of children: Not on file   • Years of education: Not on file   • Highest education level: Not on file   Occupational History   • Not on file   Tobacco Use   • Smoking status: Never Smoker   • Smokeless tobacco: Never Used   Vaping Use   • Vaping Use: Never used   Substance and Sexual Activity   • Alcohol use: Yes     Comment: occ   • Drug use: No   • Sexual activity: Yes     Partners: Male   Other Topics Concern   • Not on file   Social History Narrative   • Not on file     Social Determinants of Health     Financial Resource Strain:    • Difficulty of Paying Living Expenses:    Food Insecurity:    • Worried About Running Out of Food in the Last Year:    • Ran Out of Food in the Last Year:    Transportation Needs:    • Lack of Transportation (Medical):    • Lack of Transportation (Non-Medical):    Physical Activity:    • Days of Exercise per Week:    • Minutes of Exercise per Session:    Stress:    • Feeling of Stress :    Social Connections:   "  • Frequency of Communication with Friends and Family:    • Frequency of Social Gatherings with Friends and Family:    • Attends [employer]oration[employer] Services:    • Active Member of Clubs or Organizations:    • Attends Club or Organization Meetings:    • Marital Status:    Intimate Partner Violence:    • Fear of Current or Ex-Partner:    • Emotionally Abused:    • Physically Abused:    • Sexually Abused:        Allergies as of 08/09/2021 - Reviewed 08/09/2021   Allergen Reaction Noted   • Food Anaphylaxis and Vomiting 10/09/2011   • Sulfa drugs Anaphylaxis 11/30/2008   • Ace inhibitors Cough 02/27/2015   • Augmentin Vomiting 05/11/2018   • Nifedipine Swelling 02/27/2015        Vitals:  /74 (BP Location: Left arm, Patient Position: Sitting, BP Cuff Size: Adult)   Pulse (!) 58   Resp 16   Ht 1.63 m (5' 4.17\")   Wt 88.9 kg (196 lb)   SpO2 96%     Current medications as of today   Current Outpatient Medications   Medication Sig Dispense Refill   • losartan (COZAAR) 50 MG Tab TAKE 2 TABLETS BY MOUTH EVERY  tablet 3   • Bepotastine Besilate (BEPREVE) 1.5 % Solution Administer 1 Drop into affected eye(s) 2 times a day. 10 mL 6   • lovastatin (MEVACOR) 10 MG tablet TAKE 1 TABLET BY MOUTH EVERYDAY AT BEDTIME 90 tablet 0   • azelastine (ASTELIN) 137 MCG/SPRAY nasal spray Administer 1 Spray into affected nostril(S) 2 times a day. 30 mL 6   • diphenoxylate-atropine (LOMOTIL) 2.5-0.025 MG Tab TAKE 1 TAB BY MOUTH 4 TIMES A DAY AS NEEDED FOR DIARRHEA FOR UP TO 7 DAYS.     • atenolol (TENORMIN) 25 MG Tab TAKE 1 TABLET BY MOUTH EVERY DAY 90 Tab 3   • losartan (COZAAR) 100 MG Tab Take 1 Tab by mouth every day. 90 Tab 4   • Cyanocobalamin (B-12) 1000 MCG Cap Take 1 Tab by mouth every day.     • Loperamide HCl (IMODIUM PO) Take 1 Tab by mouth as needed (For diarrhea).     • Cholecalciferol (VITAMIN D3) 5000 UNITS CAPS Take 2,000 Units by mouth every day.     • Melatonin 5 MG TABS Take 1 Tab by mouth at bedtime as needed.     • " Coral Calcium 1000 (390 CA) MG TABS Take 1 Each by mouth every day. 60 Each      No current facility-administered medications for this visit.         Physical Exam:   Gen:           Alert and oriented, No apparent distress. Mood and affect appropriate, normal interaction with examiner.  Eyes:          PERRL, EOM intact, sclere white, conjunctive moist.  Ears:          Not examined.   Hearing:     Grossly intact.  Nose:          Normal, no lesions or deformities.  Dentition:    mask  Oropharynx:   mask  Mallampati Classification: mask  Neck:        Supple, trachea midline, no masses.  Respiratory Effort: No intercostal retractions or use of accessory muscles.   Lung Auscultation:      Clear to auscultation bilaterally; no rales, rhonchi or wheezing.  CV:            Regular rate and rhythm. No murmurs, rubs or gallops.  Abd:           Not examined.   Lymphadenopathy: Not examined.  Gait and Station: Normal.  Digits and Nails: No clubbing, cyanosis, petechiae, or nodes.   Cranial Nerves: II-XII grossly intact.  Skin:        No rashes, lesions or ulcers noted.               Ext:           No cyanosis or edema.      Assessment:  1. SHIRA (obstructive sleep apnea)  DME Mask and Supplies   2. Obesity (BMI 30.0-34.9)  Height And Weight   3. Nonsmoker         Immunizations:    Flu:recommend in fall  Pneumovax 23:2010  Prevnar 13:2014  COVID-19: 2/5/21, 1/15/21    Plan:  1.  Patient is responding well to SHIRA therapy with good control of sleep apnea but notes ongoing sneezing since reinitiating therapy.  We will continue to monitor this closely and she will review how her supplies and mask are being clean.  She does not appear to be having any skin irritation from use.  DME mask/supplies   2.  Discussed sleep hygiene and the need for consistent use of device every night for the duration of sleep  3.  For primary care for the health concerns  4.  Encourage weight loss through diet and exercise  5.  Follow-up in 1 year with  compliance report, sooner if needed.    Please note that this dictation was created using voice recognition software. I have made every reasonable attempt to correct obvious errors, but it is possible there are errors of grammar and possibly content that I did not discover before finalizing the note.

## 2021-08-23 DIAGNOSIS — R60.0 BILATERAL LOWER EXTREMITY EDEMA: ICD-10-CM

## 2021-09-24 ENCOUNTER — HOSPITAL ENCOUNTER (OUTPATIENT)
Facility: MEDICAL CENTER | Age: 81
End: 2021-09-24
Attending: FAMILY MEDICINE
Payer: MEDICARE

## 2021-09-24 ENCOUNTER — NON-PROVIDER VISIT (OUTPATIENT)
Dept: INTERNAL MEDICINE | Facility: IMAGING CENTER | Age: 81
End: 2021-09-24
Payer: MEDICARE

## 2021-09-24 DIAGNOSIS — Z20.822 ENCOUNTER FOR LABORATORY TESTING FOR COVID-19 VIRUS: ICD-10-CM

## 2021-09-24 PROCEDURE — U0003 INFECTIOUS AGENT DETECTION BY NUCLEIC ACID (DNA OR RNA); SEVERE ACUTE RESPIRATORY SYNDROME CORONAVIRUS 2 (SARS-COV-2) (CORONAVIRUS DISEASE [COVID-19]), AMPLIFIED PROBE TECHNIQUE, MAKING USE OF HIGH THROUGHPUT TECHNOLOGIES AS DESCRIBED BY CMS-2020-01-R: HCPCS

## 2021-09-25 DIAGNOSIS — Z20.822 ENCOUNTER FOR LABORATORY TESTING FOR COVID-19 VIRUS: ICD-10-CM

## 2021-09-26 LAB — COVID ORDER STATUS COVID19: NORMAL

## 2021-09-27 LAB
SARS-COV-2 RNA RESP QL NAA+PROBE: NOTDETECTED
SPECIMEN SOURCE: NORMAL

## 2021-09-30 ENCOUNTER — NON-PROVIDER VISIT (OUTPATIENT)
Dept: INTERNAL MEDICINE | Facility: IMAGING CENTER | Age: 81
End: 2021-09-30
Payer: MEDICARE

## 2021-09-30 ENCOUNTER — HOSPITAL ENCOUNTER (OUTPATIENT)
Facility: MEDICAL CENTER | Age: 81
End: 2021-09-30
Attending: FAMILY MEDICINE
Payer: MEDICARE

## 2021-09-30 DIAGNOSIS — Z20.822 ENCOUNTER FOR LABORATORY TESTING FOR COVID-19 VIRUS: ICD-10-CM

## 2021-09-30 PROCEDURE — U0003 INFECTIOUS AGENT DETECTION BY NUCLEIC ACID (DNA OR RNA); SEVERE ACUTE RESPIRATORY SYNDROME CORONAVIRUS 2 (SARS-COV-2) (CORONAVIRUS DISEASE [COVID-19]), AMPLIFIED PROBE TECHNIQUE, MAKING USE OF HIGH THROUGHPUT TECHNOLOGIES AS DESCRIBED BY CMS-2020-01-R: HCPCS

## 2021-09-30 PROCEDURE — U0005 INFEC AGEN DETEC AMPLI PROBE: HCPCS

## 2021-10-01 LAB
COVID ORDER STATUS COVID19: NORMAL
SARS-COV-2 RNA RESP QL NAA+PROBE: DETECTED
SPECIMEN SOURCE: ABNORMAL

## 2021-10-27 ENCOUNTER — HOSPITAL ENCOUNTER (OUTPATIENT)
Facility: MEDICAL CENTER | Age: 81
End: 2021-10-27
Attending: FAMILY MEDICINE
Payer: MEDICARE

## 2021-10-27 ENCOUNTER — NON-PROVIDER VISIT (OUTPATIENT)
Dept: INTERNAL MEDICINE | Facility: IMAGING CENTER | Age: 81
End: 2021-10-27
Payer: MEDICARE

## 2021-10-27 DIAGNOSIS — Z03.818 ENCOUNTER FOR OBSERVATION FOR SUSPECTED EXPOSURE TO OTHER BIOLOGICAL AGENTS RULED OUT: ICD-10-CM

## 2021-10-27 PROCEDURE — U0005 INFEC AGEN DETEC AMPLI PROBE: HCPCS

## 2021-10-27 PROCEDURE — U0003 INFECTIOUS AGENT DETECTION BY NUCLEIC ACID (DNA OR RNA); SEVERE ACUTE RESPIRATORY SYNDROME CORONAVIRUS 2 (SARS-COV-2) (CORONAVIRUS DISEASE [COVID-19]), AMPLIFIED PROBE TECHNIQUE, MAKING USE OF HIGH THROUGHPUT TECHNOLOGIES AS DESCRIBED BY CMS-2020-01-R: HCPCS

## 2021-10-27 NOTE — NON-PROVIDER
Patient's son recently tested positive for COVID.  Was home and doing better recovering.  Became ill and passed away at home.  The corner suspects COIVD re-infection.

## 2021-11-01 ENCOUNTER — OFFICE VISIT (OUTPATIENT)
Dept: INTERNAL MEDICINE | Facility: IMAGING CENTER | Age: 81
End: 2021-11-01
Payer: MEDICARE

## 2021-11-01 VITALS
DIASTOLIC BLOOD PRESSURE: 62 MMHG | TEMPERATURE: 98.8 F | HEART RATE: 57 BPM | HEIGHT: 65 IN | OXYGEN SATURATION: 99 % | BODY MASS INDEX: 32.65 KG/M2 | SYSTOLIC BLOOD PRESSURE: 126 MMHG | WEIGHT: 195.99 LBS | RESPIRATION RATE: 17 BRPM

## 2021-11-01 DIAGNOSIS — M54.12 CERVICAL RADICULAR PAIN: ICD-10-CM

## 2021-11-01 DIAGNOSIS — G89.29 CHRONIC PAIN OF BOTH KNEES: ICD-10-CM

## 2021-11-01 DIAGNOSIS — M25.562 CHRONIC PAIN OF BOTH KNEES: ICD-10-CM

## 2021-11-01 DIAGNOSIS — F43.21 GRIEF REACTION: ICD-10-CM

## 2021-11-01 DIAGNOSIS — M25.561 CHRONIC PAIN OF BOTH KNEES: ICD-10-CM

## 2021-11-01 DIAGNOSIS — Z23 NEED FOR VACCINATION: ICD-10-CM

## 2021-11-01 PROCEDURE — 90662 IIV NO PRSV INCREASED AG IM: CPT | Performed by: FAMILY MEDICINE

## 2021-11-01 PROCEDURE — 99214 OFFICE O/P EST MOD 30 MIN: CPT | Mod: 25 | Performed by: FAMILY MEDICINE

## 2021-11-01 PROCEDURE — G0008 ADMIN INFLUENZA VIRUS VAC: HCPCS | Performed by: FAMILY MEDICINE

## 2021-11-01 RX ORDER — ALPRAZOLAM 0.25 MG/1
0.25 TABLET ORAL 2 TIMES DAILY PRN
Qty: 6 TABLET | Refills: 0 | Status: SHIPPED | OUTPATIENT
Start: 2021-11-01 | End: 2021-11-04

## 2021-11-01 RX ORDER — ATENOLOL 25 MG/1
TABLET ORAL
Qty: 90 TABLET | Refills: 3 | Status: SHIPPED | OUTPATIENT
Start: 2021-11-01 | End: 2022-09-26

## 2021-11-01 ASSESSMENT — FIBROSIS 4 INDEX: FIB4 SCORE: 1.51

## 2021-11-02 NOTE — PROGRESS NOTES
"Chief Complaint   Patient presents with   • Arm Pain     Right arm that wraps around shoulder blade and up to neck. Has gone back to [facility] PT multiple times.   • Fall     3 months ago. Left knee pain has been present since fall. Left is worse but right also hurts intermittently. Has been taking Tylenol for pains.    • Immunizations     Flu shot   • Medication Refill     Something for anxiety       HPI:  Patient is a 81 y.o. female established patient who presents today to discuss her current health concerns. She sustained a MGLF in June 2021 and feel on both knee caps. She suffered bruising that resolved but she has lingering b/l medial knee joint pain (L>R) that causes her to feel very slow to start walking and when changing positions. She denies knee joint edema, no popping or cracking noises, and is able to walk independently at this time. She has also been experiencing intermittent right trapezius/deltoid/tricep pain (described as constant \"ache\") and muscle spasms over the past few months. She has been attending PT sessions with good relief but the condition continues to recur. She is also grieving the sudden death of her son last week from an enlarged heart and COVID infection. She is seeking a low dose medication to help her manage stress/anxiety related to planning his celebration of life event. She is due for annual flu vaccine today and denies other new concerns at this time.     Patient Active Problem List    Diagnosis Date Noted   • Risk for falls 06/21/2021   • SHIRA (obstructive sleep apnea) 06/21/2021   • Epidermal cyst 10/16/2018   • Seborrheic keratosis 10/16/2018   • Microscopic colitis 09/05/2017   • Essential hypertension 12/01/2015   • Obesity (BMI 30.0-34.9) 04/09/2015   • Mixed hyperlipidemia with apolipoprotein E2 variant 04/09/2015   • Insulin resistance 04/09/2015   • Metabolic syndrome 04/09/2015   • Sinus bradycardia 03/16/2015   • Eczema 10/14/2014   • Abnormal biliary HIDA scan " "10/14/2014   • Osteoporosis, post-menopausal 10/14/2014   • Environmental allergies 04/18/2014   • Vitamin D deficiency disease 11/13/2013   • Dupuytren's contracture of both hands 05/22/2013   • Degenerative joint disease        Past medical, surgical, family, and social history was reviewed and updated in Epic chart by me today.     Medications and allergies reviewed and updated in Epic chart by me today.     ROS:  Pertinent positives listed above in HPI. All other systems have been reviewed and are negative.    PE:   /62 (BP Location: Left arm, Patient Position: Sitting, BP Cuff Size: Adult)   Pulse (!) 57   Temp 37.1 °C (98.8 °F) (Temporal)   Resp 17   Ht 1.651 m (5' 5\")   Wt 88.9 kg (195 lb 15.8 oz)   LMP 01/01/2002   SpO2 99%   BMI 32.61 kg/m²   Vital signs reviewed with patient.     Gen: Well developed; well nourished; no acute distress; age appropriate appearance   C spine: no midline pain but significant right sided paraspinal muscle tightness  RUE: no gross deformity of RUE but patient has significant right trapezius tightness as well as right deltoid/tricep spasm  Lower extremities: No peripheral edema b/l LE extremities/ no clubbing nor cyanosis noted; medial knee joint pain with palpation (L>R) along MCL/medial meniscus distribution; no edema nor ecchymosis present   Skin: Warm and dry; no rashes noted   Neuro: No focal deficits noted   Psych: AAOx4; mood and affect are at her baseline    A/P:  1. Need for vaccination  Vaccine administered at visit today.  - INFLUENZA VACCINE, HIGH DOSE (65+ ONLY)    2. Grief reaction  Patient's son recently passed away due to an enlarged heart and COVID infection. She is suffering from appropriate grief and is seeking something to calm herself during the upcoming days. I feel that low dose Xanax will be appropriate for patient use at this time.  reviewed today and no concerns noted. Pt is well versed in safe use of controlled medication, and benefit of " use outweighs risk at this time. New RX sent to pharmacy.   - ALPRAZolam (XANAX) 0.25 MG Tab; Take 1 Tablet by mouth 2 times a day as needed for Anxiety for up to 3 days.  Dispense: 6 Tablet; Refill: 0    3. Chronic pain of both knees  Patient has been experiencing bilateral medial knee joint pain (L>R) since falling onto her knee in June - refer to hospitals for details. She reports feeling very slow when starting to walk or change positions due to her knee joint pain and is seeking further workup. We discussed condition at visit and recommend starting with bilateral knee joint films.   - DX-KNEE 2- LEFT; Future  - DX-KNEE 2- RIGHT; Future    4. Cervical radicular pain  Patient has been experiencing right trapezius and deltoid spasms intermittently helped with massage and PT sessions. Due to recurrent nature of her symptoms, recommend imaging cervical spine, which I feel is the origin of her issues.   - DX-CERVICAL SPINE-2 OR 3 VIEWS; Future     I will contact patient when xrays have been completed for further medical management.

## 2021-11-05 ENCOUNTER — HOSPITAL ENCOUNTER (OUTPATIENT)
Dept: RADIOLOGY | Facility: MEDICAL CENTER | Age: 81
End: 2021-11-05
Attending: FAMILY MEDICINE
Payer: MEDICARE

## 2021-11-05 DIAGNOSIS — G89.29 CHRONIC PAIN OF BOTH KNEES: ICD-10-CM

## 2021-11-05 DIAGNOSIS — M54.12 CERVICAL RADICULAR PAIN: ICD-10-CM

## 2021-11-05 DIAGNOSIS — M25.562 CHRONIC PAIN OF BOTH KNEES: ICD-10-CM

## 2021-11-05 DIAGNOSIS — M25.561 CHRONIC PAIN OF BOTH KNEES: ICD-10-CM

## 2021-11-05 PROCEDURE — 72040 X-RAY EXAM NECK SPINE 2-3 VW: CPT

## 2021-11-05 PROCEDURE — 73560 X-RAY EXAM OF KNEE 1 OR 2: CPT | Mod: RT

## 2021-11-05 PROCEDURE — 73560 X-RAY EXAM OF KNEE 1 OR 2: CPT | Mod: LT

## 2021-11-08 DIAGNOSIS — M54.12 CERVICAL RADICULAR PAIN: ICD-10-CM

## 2021-11-08 DIAGNOSIS — M50.30 DDD (DEGENERATIVE DISC DISEASE), CERVICAL: ICD-10-CM

## 2021-11-12 DIAGNOSIS — M25.562 CHRONIC PAIN OF BOTH KNEES: ICD-10-CM

## 2021-11-12 DIAGNOSIS — G89.29 CHRONIC PAIN OF BOTH KNEES: ICD-10-CM

## 2021-11-12 DIAGNOSIS — M25.561 CHRONIC PAIN OF BOTH KNEES: ICD-10-CM

## 2021-11-12 DIAGNOSIS — Z91.81 HISTORY OF FALL: ICD-10-CM

## 2021-11-22 DIAGNOSIS — M54.12 CERVICAL RADICULAR PAIN: ICD-10-CM

## 2021-11-22 DIAGNOSIS — M50.30 DDD (DEGENERATIVE DISC DISEASE), CERVICAL: ICD-10-CM

## 2021-11-22 RX ORDER — MELOXICAM 7.5 MG/1
7.5 TABLET ORAL DAILY
Qty: 30 TABLET | Refills: 3 | Status: SHIPPED | OUTPATIENT
Start: 2021-11-22 | End: 2022-07-08

## 2021-12-23 ENCOUNTER — TELEPHONE (OUTPATIENT)
Dept: HEALTH INFORMATION MANAGEMENT | Facility: OTHER | Age: 81
End: 2021-12-23

## 2021-12-23 RX ORDER — ZOLEDRONIC ACID 5 MG/100ML
5 INJECTION, SOLUTION INTRAVENOUS ONCE
Status: CANCELLED | OUTPATIENT
Start: 2021-12-23 | End: 2021-12-23

## 2021-12-27 ENCOUNTER — APPOINTMENT (OUTPATIENT)
Dept: ONCOLOGY | Facility: MEDICAL CENTER | Age: 81
End: 2021-12-27
Attending: FAMILY MEDICINE
Payer: MEDICARE

## 2022-01-05 ENCOUNTER — HOSPITAL ENCOUNTER (OUTPATIENT)
Dept: RADIOLOGY | Facility: MEDICAL CENTER | Age: 82
End: 2022-01-05
Attending: NEUROLOGICAL SURGERY
Payer: MEDICARE

## 2022-01-05 DIAGNOSIS — M54.2 CERVICALGIA: ICD-10-CM

## 2022-01-05 PROCEDURE — 72050 X-RAY EXAM NECK SPINE 4/5VWS: CPT

## 2022-01-05 PROCEDURE — 72141 MRI NECK SPINE W/O DYE: CPT

## 2022-01-06 ENCOUNTER — OUTPATIENT INFUSION SERVICES (OUTPATIENT)
Dept: ONCOLOGY | Facility: MEDICAL CENTER | Age: 82
End: 2022-01-06
Attending: FAMILY MEDICINE
Payer: MEDICARE

## 2022-01-06 VITALS
HEART RATE: 74 BPM | DIASTOLIC BLOOD PRESSURE: 101 MMHG | TEMPERATURE: 98.4 F | WEIGHT: 192.68 LBS | SYSTOLIC BLOOD PRESSURE: 207 MMHG | OXYGEN SATURATION: 98 % | HEIGHT: 65 IN | BODY MASS INDEX: 32.1 KG/M2 | RESPIRATION RATE: 17 BRPM

## 2022-01-06 DIAGNOSIS — M81.0 OSTEOPOROSIS, POST-MENOPAUSAL: ICD-10-CM

## 2022-01-06 LAB
CA-I BLD ISE-SCNC: 1 MMOL/L (ref 1.1–1.3)
CREAT BLD-MCNC: 1.2 MG/DL (ref 0.5–1.4)

## 2022-01-06 PROCEDURE — 82565 ASSAY OF CREATININE: CPT

## 2022-01-06 PROCEDURE — 96365 THER/PROPH/DIAG IV INF INIT: CPT

## 2022-01-06 PROCEDURE — 36415 COLL VENOUS BLD VENIPUNCTURE: CPT

## 2022-01-06 PROCEDURE — 82330 ASSAY OF CALCIUM: CPT

## 2022-01-06 PROCEDURE — 700111 HCHG RX REV CODE 636 W/ 250 OVERRIDE (IP): Performed by: FAMILY MEDICINE

## 2022-01-06 RX ORDER — ZOLEDRONIC ACID 5 MG/100ML
5 INJECTION, SOLUTION INTRAVENOUS ONCE
Status: CANCELLED | OUTPATIENT
Start: 2023-01-07 | End: 2023-01-07

## 2022-01-06 RX ORDER — ZOLEDRONIC ACID 5 MG/100ML
5 INJECTION, SOLUTION INTRAVENOUS ONCE
Status: COMPLETED | OUTPATIENT
Start: 2022-01-06 | End: 2022-01-06

## 2022-01-06 RX ADMIN — ZOLEDRONIC ACID 5 MG: 0.05 INJECTION, SOLUTION INTRAVENOUS at 16:38

## 2022-01-06 ASSESSMENT — FIBROSIS 4 INDEX: FIB4 SCORE: 1.51

## 2022-01-06 ASSESSMENT — PAIN DESCRIPTION - PAIN TYPE: TYPE: CHRONIC PAIN

## 2022-01-07 DIAGNOSIS — M79.2 NEUROPATHIC PAIN: ICD-10-CM

## 2022-01-07 RX ORDER — PREGABALIN 75 MG/1
75 CAPSULE ORAL 2 TIMES DAILY
Qty: 60 CAPSULE | Refills: 2 | Status: SHIPPED | OUTPATIENT
Start: 2022-01-07 | End: 2022-02-06

## 2022-01-07 NOTE — PROGRESS NOTES
Pt presents to [facility] for yearly zoledronic acid infusion. Established PIV in LAC; brisk blood return observed and pt tolerated well. Istat labs drawn and within treatable parameters. Zoledronic acid infused with no s/s of adverse reactions. PIV flushed and brisk blood return observed before removing; gauze/coband dressing applied. Next appointment confirmed and education provided. Pt discharged to self care with all personal belongings and in NAD.

## 2022-01-31 ENCOUNTER — HOSPITAL ENCOUNTER (OUTPATIENT)
Dept: RADIOLOGY | Facility: MEDICAL CENTER | Age: 82
End: 2022-01-31
Attending: STUDENT IN AN ORGANIZED HEALTH CARE EDUCATION/TRAINING PROGRAM
Payer: MEDICARE

## 2022-01-31 DIAGNOSIS — M25.511 RIGHT SHOULDER PAIN, UNSPECIFIED CHRONICITY: ICD-10-CM

## 2022-01-31 PROCEDURE — 73030 X-RAY EXAM OF SHOULDER: CPT | Mod: RT

## 2022-03-29 DIAGNOSIS — L60.8 TOENAIL DEFORMITY: ICD-10-CM

## 2022-05-02 DIAGNOSIS — B35.1 ONYCHOMYCOSIS: ICD-10-CM

## 2022-05-10 DIAGNOSIS — M48.061 SPINAL STENOSIS OF LUMBAR REGION WITHOUT NEUROGENIC CLAUDICATION: ICD-10-CM

## 2022-05-10 DIAGNOSIS — M51.16 RADICULOPATHY DUE TO LUMBAR INTERVERTEBRAL DISC DISORDER: ICD-10-CM

## 2022-05-17 ENCOUNTER — HOSPITAL ENCOUNTER (OUTPATIENT)
Dept: RADIOLOGY | Facility: MEDICAL CENTER | Age: 82
End: 2022-05-17
Attending: FAMILY MEDICINE
Payer: MEDICARE

## 2022-05-17 ENCOUNTER — PATIENT MESSAGE (OUTPATIENT)
Dept: HEALTH INFORMATION MANAGEMENT | Facility: OTHER | Age: 82
End: 2022-05-17

## 2022-05-17 DIAGNOSIS — M51.16 RADICULOPATHY DUE TO LUMBAR INTERVERTEBRAL DISC DISORDER: ICD-10-CM

## 2022-05-17 DIAGNOSIS — M48.061 SPINAL STENOSIS OF LUMBAR REGION WITHOUT NEUROGENIC CLAUDICATION: ICD-10-CM

## 2022-05-17 PROCEDURE — 72148 MRI LUMBAR SPINE W/O DYE: CPT | Mod: ME

## 2022-05-24 ENCOUNTER — OFFICE VISIT (OUTPATIENT)
Dept: INTERNAL MEDICINE | Facility: IMAGING CENTER | Age: 82
End: 2022-05-24
Payer: MEDICARE

## 2022-05-24 VITALS
SYSTOLIC BLOOD PRESSURE: 126 MMHG | OXYGEN SATURATION: 97 % | RESPIRATION RATE: 17 BRPM | WEIGHT: 192.68 LBS | TEMPERATURE: 98.8 F | HEIGHT: 64 IN | DIASTOLIC BLOOD PRESSURE: 68 MMHG | HEART RATE: 54 BPM | BODY MASS INDEX: 32.9 KG/M2

## 2022-05-24 DIAGNOSIS — M54.2 CERVICALGIA: ICD-10-CM

## 2022-05-24 DIAGNOSIS — M25.562 CHRONIC PAIN OF LEFT KNEE: ICD-10-CM

## 2022-05-24 DIAGNOSIS — B35.1 ONYCHOMYCOSIS: ICD-10-CM

## 2022-05-24 DIAGNOSIS — Z00.00 HEALTH CARE MAINTENANCE: ICD-10-CM

## 2022-05-24 DIAGNOSIS — M79.672 PAIN OF LEFT HEEL: ICD-10-CM

## 2022-05-24 DIAGNOSIS — G89.29 CHRONIC PAIN OF LEFT KNEE: ICD-10-CM

## 2022-05-24 DIAGNOSIS — M54.10 RADICULOPATHY AFFECTING UPPER EXTREMITY: ICD-10-CM

## 2022-05-24 PROCEDURE — 99214 OFFICE O/P EST MOD 30 MIN: CPT | Performed by: FAMILY MEDICINE

## 2022-05-24 RX ORDER — NEOMYCIN SULFATE, POLYMYXIN B SULFATE AND DEXAMETHASONE 3.5; 10000; 1 MG/ML; [USP'U]/ML; MG/ML
SUSPENSION/ DROPS OPHTHALMIC
COMMUNITY
Start: 2022-05-06 | End: 2022-07-08

## 2022-05-24 RX ORDER — TRAMADOL HYDROCHLORIDE 50 MG/1
TABLET ORAL
COMMUNITY
Start: 2022-01-13 | End: 2022-07-08

## 2022-05-24 RX ORDER — GABAPENTIN 300 MG/1
600 CAPSULE ORAL
COMMUNITY
Start: 2022-05-19 | End: 2022-11-07

## 2022-05-24 ASSESSMENT — FIBROSIS 4 INDEX: FIB4 SCORE: 1.51

## 2022-05-25 DIAGNOSIS — E78.2 MIXED HYPERLIPIDEMIA WITH APOLIPOPROTEIN E2 VARIANT: Chronic | ICD-10-CM

## 2022-05-25 DIAGNOSIS — M25.50 ARTHRALGIA, UNSPECIFIED JOINT: ICD-10-CM

## 2022-05-25 DIAGNOSIS — G62.9 NEUROPATHY: ICD-10-CM

## 2022-05-25 DIAGNOSIS — I10 ESSENTIAL HYPERTENSION: Chronic | ICD-10-CM

## 2022-05-25 DIAGNOSIS — E55.9 VITAMIN D DEFICIENCY: Chronic | ICD-10-CM

## 2022-05-25 DIAGNOSIS — R73.9 HYPERGLYCEMIA: ICD-10-CM

## 2022-05-25 DIAGNOSIS — R80.8 OTHER PROTEINURIA: ICD-10-CM

## 2022-05-25 DIAGNOSIS — R53.83 OTHER FATIGUE: ICD-10-CM

## 2022-05-25 NOTE — PROGRESS NOTES
Chief Complaint   Patient presents with   • Arm Pain     Right arm pain. She has seen Dr. Vallejo and Dr. Echavarria. 900mg Gabapentin daily. Mild neuropathy in lower forearm d/t pain. She has been having pain that lays her up in bed. Has tried tramadol and flexeril with no relief.   • Knee Pain     Left knee pain still. Has not been able to f/u on referral.    • Arthritis     Left pointer finger arthritis. She was getting a cream for Dr. Echavarria previously.    • Tingling     Left heel started the past few weeks. She thinks it might be new neuropathy. Mild numbness and intermittent tingling.    • Nail Problem     She is curisel - saw Dr. Huitron. Has not been very helpful. The new podiatry referral that was sent to [place] office - that provider has closed their practice.    • Follow-Up     Son that recently passed away and coroners office discovered extensive CAD. They told her she should be checked.        HPI:  Patient is a 81 y.o. female established patient who presents today to discuss her current health concerns. She reports that she remains in constant pain from head to toe from her various ailments despite Neurontin and Gabapentin use. She has chronic left knee pain related to OA that she would like evaluated by Ortho team, and ongoing cervicalgia with right arm radiculopathy managed by Dr. Echavarria (appointment tomorrow) and Dr. Vallejo (upcoming appointment in June). She also has toenail fungus and now has been experiencing left heel pain and would like a new podiatry referral (most recent one was invalid). She also has chronic left pointer finger arthritis pain managed in the past with compounded topical cream prescribed by Dr. Echavarria (encouraged patient to ask him for new RX at appointment tomorrow). She continues to attend [facility] sessions at [facility] and will be due for fasting labs and Medicare Annual Wellness visit next month with me.     Patient Active Problem List    Diagnosis Date Noted   • Risk for falls 06/21/2021   •  "SHIRA (obstructive sleep apnea) 06/21/2021   • Epidermal cyst 10/16/2018   • Seborrheic keratosis 10/16/2018   • Microscopic colitis 09/05/2017   • Essential hypertension 12/01/2015   • Obesity (BMI 30.0-34.9) 04/09/2015   • Mixed hyperlipidemia with apolipoprotein E2 variant 04/09/2015   • Insulin resistance 04/09/2015   • Metabolic syndrome 04/09/2015   • Sinus bradycardia 03/16/2015   • Eczema 10/14/2014   • Abnormal biliary HIDA scan 10/14/2014   • Osteoporosis, post-menopausal 10/14/2014   • Environmental allergies 04/18/2014   • Vitamin D deficiency disease 11/13/2013   • Dupuytren's contracture of both hands 05/22/2013   • Degenerative joint disease        Past medical, surgical, family, and social history was reviewed and updated in Epic chart by me today.     Medications and allergies reviewed and updated in Epic chart by me today.     ROS:  Pertinent positives listed above in HPI. All other systems have been reviewed and are negative.    PE:   /68 (BP Location: Left arm, Patient Position: Sitting, BP Cuff Size: Adult)   Pulse (!) 54   Temp 37.1 °C (98.8 °F) (Temporal)   Resp 17   Ht 1.626 m (5' 4\")   Wt 87.4 kg (192 lb 10.9 oz)   LMP 01/01/2002   SpO2 97%   BMI 33.07 kg/m²   Vital signs reviewed with patient.     Gen: Well developed; well nourished; no acute distress; age appropriate appearance   HEENT: Normocephalic; atraumatic; PEERLA b/l; sclera clear b/l; b/l external auditory canals WNL; b/l TM WNL; nares patent; oropharynx clear; oral mucosa moist; tongue midline; dentition adequate   Neck: No adenopathy; no thyromegaly  CV: Regular rate and rhythm; S1/ S2 present; no murmur, gallop or rub noted  Pulm: No respiratory distress; clear to ascultation b/l; no wheezing or stridor noted b/l  Abd: Adequate bowel sounds noted; soft and nontender; no rebound, rigidity, nor distention; no hepatosplenogmegaly   Extremities: No peripheral edema b/l LE extremities/ no clubbing nor cyanosis " noted  Skin: Warm and dry; no rashes noted   Neuro: No focal deficits noted   Psych: AAOx4; mood and affect are appropriate    A/P:  1. Onychomycosis  Ongoing issue for patient (prior referral to podiatry was invalid). Will refer patient to Dr. Gastelum for evaluation and treatment.   - Referral to Podiatry    2. Pain of left heel  Ongoing issue for patient that she would like to have podiatrist evaluate. Will refer patient to Dr. Gastelum for evaluation and treatment.   - Referral to Podiatry    3. Chronic pain of left knee  Long standing issue for patient and she is now ready for a new Orthopedic evaluation. Referral made to [facility] for evaluation and treatment.   - Referral to Orthopedics    4. Cervicalgia/ Radiculopathy affecting upper extremity  Ongoing issue for patient being managed by Dr. Echavarria and Dr. Vallejo. She has upcoming appointments with both providers and is compliant with all related medication use. She remains very frustrated with her overall pain issues, and I will add ESR/CRP to upcoming lab draw for further evaluation.     6. Health care maintenance  Patient will be due for fasting labs and Medicare Wellness visit next month with me for ongoing medical management.

## 2022-06-06 DIAGNOSIS — E78.2 MIXED DYSLIPIDEMIA: ICD-10-CM

## 2022-06-06 RX ORDER — LOVASTATIN 10 MG/1
TABLET ORAL
Qty: 100 TABLET | Refills: 0 | Status: SHIPPED | OUTPATIENT
Start: 2022-06-06 | End: 2022-07-10 | Stop reason: SDUPTHER

## 2022-06-09 ENCOUNTER — TELEPHONE (OUTPATIENT)
Dept: HEALTH INFORMATION MANAGEMENT | Facility: OTHER | Age: 82
End: 2022-06-09

## 2022-06-15 ENCOUNTER — HOSPITAL ENCOUNTER (OUTPATIENT)
Facility: MEDICAL CENTER | Age: 82
End: 2022-06-15
Attending: FAMILY MEDICINE
Payer: MEDICARE

## 2022-06-15 ENCOUNTER — NON-PROVIDER VISIT (OUTPATIENT)
Dept: INTERNAL MEDICINE | Facility: IMAGING CENTER | Age: 82
End: 2022-06-15
Payer: MEDICARE

## 2022-06-15 DIAGNOSIS — R80.8 OTHER PROTEINURIA: ICD-10-CM

## 2022-06-15 DIAGNOSIS — G62.9 NEUROPATHY: ICD-10-CM

## 2022-06-15 DIAGNOSIS — R73.9 HYPERGLYCEMIA: ICD-10-CM

## 2022-06-15 DIAGNOSIS — R53.83 OTHER FATIGUE: ICD-10-CM

## 2022-06-15 DIAGNOSIS — M25.50 ARTHRALGIA, UNSPECIFIED JOINT: ICD-10-CM

## 2022-06-15 DIAGNOSIS — E55.9 VITAMIN D DEFICIENCY: Chronic | ICD-10-CM

## 2022-06-15 DIAGNOSIS — I10 ESSENTIAL HYPERTENSION: Chronic | ICD-10-CM

## 2022-06-15 DIAGNOSIS — E78.2 MIXED HYPERLIPIDEMIA WITH APOLIPOPROTEIN E2 VARIANT: Chronic | ICD-10-CM

## 2022-06-15 LAB
25(OH)D3 SERPL-MCNC: 45 NG/ML (ref 30–100)
ALBUMIN SERPL BCP-MCNC: 4.2 G/DL (ref 3.2–4.9)
ALBUMIN/GLOB SERPL: 1.6 G/DL
ALP SERPL-CCNC: 78 U/L (ref 30–99)
ALT SERPL-CCNC: 14 U/L (ref 2–50)
ANION GAP SERPL CALC-SCNC: 10 MMOL/L (ref 7–16)
AST SERPL-CCNC: 17 U/L (ref 12–45)
BASOPHILS # BLD AUTO: 0.2 % (ref 0–1.8)
BASOPHILS # BLD: 0.03 K/UL (ref 0–0.12)
BILIRUB SERPL-MCNC: 0.3 MG/DL (ref 0.1–1.5)
BUN SERPL-MCNC: 24 MG/DL (ref 8–22)
CALCIUM SERPL-MCNC: 9.6 MG/DL (ref 8.5–10.5)
CHLORIDE SERPL-SCNC: 102 MMOL/L (ref 96–112)
CHOLEST SERPL-MCNC: 140 MG/DL (ref 100–199)
CO2 SERPL-SCNC: 26 MMOL/L (ref 20–33)
CREAT SERPL-MCNC: 1.21 MG/DL (ref 0.5–1.4)
CREAT UR-MCNC: 111.09 MG/DL
CRP SERPL HS-MCNC: <0.3 MG/DL (ref 0–0.75)
EOSINOPHIL # BLD AUTO: 0.25 K/UL (ref 0–0.51)
EOSINOPHIL NFR BLD: 1.9 % (ref 0–6.9)
ERYTHROCYTE [DISTWIDTH] IN BLOOD BY AUTOMATED COUNT: 44.9 FL (ref 35.9–50)
ERYTHROCYTE [SEDIMENTATION RATE] IN BLOOD BY WESTERGREN METHOD: 22 MM/HOUR (ref 0–25)
EST. AVERAGE GLUCOSE BLD GHB EST-MCNC: 151 MG/DL
FOLATE SERPL-MCNC: 17.1 NG/ML
GFR SERPLBLD CREATININE-BSD FMLA CKD-EPI: 45 ML/MIN/1.73 M 2
GLOBULIN SER CALC-MCNC: 2.7 G/DL (ref 1.9–3.5)
GLUCOSE SERPL-MCNC: 124 MG/DL (ref 65–99)
HBA1C MFR BLD: 6.9 % (ref 4–5.6)
HCT VFR BLD AUTO: 44.1 % (ref 37–47)
HDLC SERPL-MCNC: 49 MG/DL
HGB BLD-MCNC: 14.5 G/DL (ref 12–16)
IMM GRANULOCYTES # BLD AUTO: 0.05 K/UL (ref 0–0.11)
IMM GRANULOCYTES NFR BLD AUTO: 0.4 % (ref 0–0.9)
LDLC SERPL CALC-MCNC: 60 MG/DL
LYMPHOCYTES # BLD AUTO: 2.77 K/UL (ref 1–4.8)
LYMPHOCYTES NFR BLD: 21.1 % (ref 22–41)
MCH RBC QN AUTO: 31 PG (ref 27–33)
MCHC RBC AUTO-ENTMCNC: 32.9 G/DL (ref 33.6–35)
MCV RBC AUTO: 94.4 FL (ref 81.4–97.8)
MICROALBUMIN UR-MCNC: 2.4 MG/DL
MICROALBUMIN/CREAT UR: 22 MG/G (ref 0–30)
MONOCYTES # BLD AUTO: 0.69 K/UL (ref 0–0.85)
MONOCYTES NFR BLD AUTO: 5.3 % (ref 0–13.4)
NEUTROPHILS # BLD AUTO: 9.31 K/UL (ref 2–7.15)
NEUTROPHILS NFR BLD: 71.1 % (ref 44–72)
NRBC # BLD AUTO: 0 K/UL
NRBC BLD-RTO: 0 /100 WBC
PLATELET # BLD AUTO: 279 K/UL (ref 164–446)
PMV BLD AUTO: 10.5 FL (ref 9–12.9)
POTASSIUM SERPL-SCNC: 4 MMOL/L (ref 3.6–5.5)
PROT SERPL-MCNC: 6.9 G/DL (ref 6–8.2)
RBC # BLD AUTO: 4.67 M/UL (ref 4.2–5.4)
SODIUM SERPL-SCNC: 138 MMOL/L (ref 135–145)
TRIGL SERPL-MCNC: 155 MG/DL (ref 0–149)
TSH SERPL DL<=0.005 MIU/L-ACNC: 1.69 UIU/ML (ref 0.38–5.33)
VIT B12 SERPL-MCNC: 1119 PG/ML (ref 211–911)
WBC # BLD AUTO: 13.1 K/UL (ref 4.8–10.8)

## 2022-06-15 PROCEDURE — 80053 COMPREHEN METABOLIC PANEL: CPT

## 2022-06-15 PROCEDURE — 80061 LIPID PANEL: CPT

## 2022-06-15 PROCEDURE — 82746 ASSAY OF FOLIC ACID SERUM: CPT

## 2022-06-15 PROCEDURE — 82306 VITAMIN D 25 HYDROXY: CPT

## 2022-06-15 PROCEDURE — 85025 COMPLETE CBC W/AUTO DIFF WBC: CPT

## 2022-06-15 PROCEDURE — 85652 RBC SED RATE AUTOMATED: CPT

## 2022-06-15 PROCEDURE — 84443 ASSAY THYROID STIM HORMONE: CPT

## 2022-06-15 PROCEDURE — 83036 HEMOGLOBIN GLYCOSYLATED A1C: CPT

## 2022-06-15 PROCEDURE — 86140 C-REACTIVE PROTEIN: CPT

## 2022-06-15 PROCEDURE — 82570 ASSAY OF URINE CREATININE: CPT

## 2022-06-15 PROCEDURE — 82043 UR ALBUMIN QUANTITATIVE: CPT

## 2022-06-15 PROCEDURE — 82607 VITAMIN B-12: CPT

## 2022-06-19 RX ORDER — LOSARTAN POTASSIUM 50 MG/1
TABLET ORAL
Qty: 200 TABLET | Refills: 3 | Status: SHIPPED | OUTPATIENT
Start: 2022-06-19 | End: 2022-07-08

## 2022-07-08 ENCOUNTER — HOSPITAL ENCOUNTER (OUTPATIENT)
Facility: MEDICAL CENTER | Age: 82
End: 2022-07-08
Attending: FAMILY MEDICINE
Payer: MEDICARE

## 2022-07-08 ENCOUNTER — OFFICE VISIT (OUTPATIENT)
Dept: INTERNAL MEDICINE | Facility: IMAGING CENTER | Age: 82
End: 2022-07-08
Payer: MEDICARE

## 2022-07-08 VITALS
BODY MASS INDEX: 32.88 KG/M2 | HEIGHT: 64 IN | SYSTOLIC BLOOD PRESSURE: 118 MMHG | RESPIRATION RATE: 17 BRPM | OXYGEN SATURATION: 95 % | HEART RATE: 57 BPM | WEIGHT: 192.6 LBS | TEMPERATURE: 98.3 F | DIASTOLIC BLOOD PRESSURE: 62 MMHG

## 2022-07-08 DIAGNOSIS — R00.1 SINUS BRADYCARDIA: Chronic | ICD-10-CM

## 2022-07-08 DIAGNOSIS — E88.819 INSULIN RESISTANCE: Chronic | ICD-10-CM

## 2022-07-08 DIAGNOSIS — E55.9 VITAMIN D DEFICIENCY: Chronic | ICD-10-CM

## 2022-07-08 DIAGNOSIS — I10 ESSENTIAL HYPERTENSION: ICD-10-CM

## 2022-07-08 DIAGNOSIS — M81.0 OSTEOPOROSIS, POST-MENOPAUSAL: Chronic | ICD-10-CM

## 2022-07-08 DIAGNOSIS — Z00.00 ENCOUNTER FOR MEDICARE ANNUAL WELLNESS EXAM: ICD-10-CM

## 2022-07-08 DIAGNOSIS — E11.9 TYPE 2 DIABETES MELLITUS WITHOUT COMPLICATION, WITHOUT LONG-TERM CURRENT USE OF INSULIN (HCC): ICD-10-CM

## 2022-07-08 DIAGNOSIS — Z71.85 VACCINE COUNSELING: ICD-10-CM

## 2022-07-08 DIAGNOSIS — N18.31 STAGE 3A CHRONIC KIDNEY DISEASE: ICD-10-CM

## 2022-07-08 DIAGNOSIS — Z00.00 HEALTH CARE MAINTENANCE: ICD-10-CM

## 2022-07-08 DIAGNOSIS — E66.9 OBESITY (BMI 30-39.9): ICD-10-CM

## 2022-07-08 DIAGNOSIS — E78.2 MIXED HYPERLIPIDEMIA WITH APOLIPOPROTEIN E2 VARIANT: Chronic | ICD-10-CM

## 2022-07-08 DIAGNOSIS — K52.838 OTHER MICROSCOPIC COLITIS: ICD-10-CM

## 2022-07-08 DIAGNOSIS — Z12.31 ENCOUNTER FOR SCREENING MAMMOGRAM FOR BREAST CANCER: ICD-10-CM

## 2022-07-08 DIAGNOSIS — L82.1 SEBORRHEIC KERATOSIS: ICD-10-CM

## 2022-07-08 DIAGNOSIS — M15.9 PRIMARY OSTEOARTHRITIS INVOLVING MULTIPLE JOINTS: Chronic | ICD-10-CM

## 2022-07-08 DIAGNOSIS — G47.33 OSA (OBSTRUCTIVE SLEEP APNEA): Chronic | ICD-10-CM

## 2022-07-08 PROCEDURE — 85025 COMPLETE CBC W/AUTO DIFF WBC: CPT

## 2022-07-08 PROCEDURE — G0439 PPPS, SUBSEQ VISIT: HCPCS | Performed by: FAMILY MEDICINE

## 2022-07-08 ASSESSMENT — ENCOUNTER SYMPTOMS: GENERAL WELL-BEING: GOOD

## 2022-07-08 ASSESSMENT — ACTIVITIES OF DAILY LIVING (ADL): BATHING_REQUIRES_ASSISTANCE: 0

## 2022-07-08 ASSESSMENT — PATIENT HEALTH QUESTIONNAIRE - PHQ9: CLINICAL INTERPRETATION OF PHQ2 SCORE: 0

## 2022-07-08 ASSESSMENT — FIBROSIS 4 INDEX: FIB4 SCORE: 1.34

## 2022-07-09 LAB
BASOPHILS # BLD AUTO: 0.5 % (ref 0–1.8)
BASOPHILS # BLD: 0.04 K/UL (ref 0–0.12)
EOSINOPHIL # BLD AUTO: 0.27 K/UL (ref 0–0.51)
EOSINOPHIL NFR BLD: 3.6 % (ref 0–6.9)
ERYTHROCYTE [DISTWIDTH] IN BLOOD BY AUTOMATED COUNT: 45.1 FL (ref 35.9–50)
HCT VFR BLD AUTO: 43.7 % (ref 37–47)
HGB BLD-MCNC: 14 G/DL (ref 12–16)
IMM GRANULOCYTES # BLD AUTO: 0.02 K/UL (ref 0–0.11)
IMM GRANULOCYTES NFR BLD AUTO: 0.3 % (ref 0–0.9)
LYMPHOCYTES # BLD AUTO: 2.96 K/UL (ref 1–4.8)
LYMPHOCYTES NFR BLD: 38.9 % (ref 22–41)
MCH RBC QN AUTO: 30.3 PG (ref 27–33)
MCHC RBC AUTO-ENTMCNC: 32 G/DL (ref 33.6–35)
MCV RBC AUTO: 94.6 FL (ref 81.4–97.8)
MONOCYTES # BLD AUTO: 0.65 K/UL (ref 0–0.85)
MONOCYTES NFR BLD AUTO: 8.6 % (ref 0–13.4)
NEUTROPHILS # BLD AUTO: 3.66 K/UL (ref 2–7.15)
NEUTROPHILS NFR BLD: 48.1 % (ref 44–72)
NRBC # BLD AUTO: 0 K/UL
NRBC BLD-RTO: 0 /100 WBC
PLATELET # BLD AUTO: 273 K/UL (ref 164–446)
PMV BLD AUTO: 10.8 FL (ref 9–12.9)
RBC # BLD AUTO: 4.62 M/UL (ref 4.2–5.4)
WBC # BLD AUTO: 7.6 K/UL (ref 4.8–10.8)

## 2022-07-10 DIAGNOSIS — I10 ESSENTIAL HYPERTENSION: Chronic | ICD-10-CM

## 2022-07-10 DIAGNOSIS — E78.2 MIXED DYSLIPIDEMIA: ICD-10-CM

## 2022-07-10 PROBLEM — N18.31 STAGE 3A CHRONIC KIDNEY DISEASE: Status: ACTIVE | Noted: 2022-07-10

## 2022-07-10 PROBLEM — E11.9 TYPE 2 DIABETES MELLITUS WITHOUT COMPLICATION, WITHOUT LONG-TERM CURRENT USE OF INSULIN (HCC): Status: ACTIVE | Noted: 2022-07-10

## 2022-07-10 PROBLEM — Z91.81 RISK FOR FALLS: Status: RESOLVED | Noted: 2021-06-21 | Resolved: 2022-07-10

## 2022-07-10 RX ORDER — LOSARTAN POTASSIUM 100 MG/1
100 TABLET ORAL DAILY
Qty: 100 TABLET | Refills: 3 | Status: SHIPPED | OUTPATIENT
Start: 2022-07-10 | End: 2022-11-23 | Stop reason: SDUPTHER

## 2022-07-10 RX ORDER — LOVASTATIN 10 MG/1
10 TABLET ORAL
Qty: 100 TABLET | Refills: 0 | Status: SHIPPED | OUTPATIENT
Start: 2022-07-10 | End: 2023-01-03 | Stop reason: SDUPTHER

## 2022-07-10 NOTE — PROGRESS NOTES
CC:   Medicare Annual Wellness Visit    HPI:  Kacie is a 81 y.o. female here for her Medicare Annual Wellness Visit and to review labs done 6/15/22. She reports that her overall health has been stable, and her right arm pain has resolved (starting Gabapentin wean per specialty team). She has chronic seasonal allergies and has  established care with [facility]ology in [place]. She is due for annual screening mammogram and continues to sloan obesity. She has a history of microscopic colitis managed by her GI team, and chronic essential HTN managed with ongoing daily medication use. She also has history of chronic vitamin D deficiency with daily vitamin D supplementation. She has chronic metabolic syndrome/insulin resistance without current DM diagnosis, and known osteoporosis - h/o Reclast infusion use. She has annual exam scheduled in August and is due for skin check at [facility] [place]. She is aware of vaccine eligibility, denies new mental health concerns, and is in good spirits today.     Patient Active Problem List    Diagnosis Date Noted   • Stage 3a chronic kidney disease (HCC) 07/10/2022   • Type 2 diabetes mellitus without complication, without long-term current use of insulin ([facility]) 07/10/2022   • Obesity (BMI 30-39.9) 07/08/2022   • SHIRA (obstructive sleep apnea) 06/21/2021   • Epidermal cyst 10/16/2018   • Seborrheic keratosis 10/16/2018   • Microscopic colitis 09/05/2017   • Essential hypertension 12/01/2015   • Mixed hyperlipidemia with apolipoprotein E2 variant 04/09/2015   • Insulin resistance 04/09/2015   • Metabolic syndrome 04/09/2015   • Sinus bradycardia 03/16/2015   • Eczema 10/14/2014   • Abnormal biliary HIDA scan 10/14/2014   • Osteoporosis, post-menopausal 10/14/2014   • Environmental allergies 04/18/2014   • Vitamin D deficiency disease 11/13/2013   • Dupuytren's contracture of both hands 05/22/2013   • Degenerative joint disease      Current Outpatient  Medications   Medication Sig Dispense Refill   • lovastatin (MEVACOR) 10 MG tablet TAKE 1 TABLET BY MOUTH EVERYDAY AT BEDTIME 100 Tablet 0   • gabapentin (NEURONTIN) 300 MG Cap 600 mg. Indications: She is titrating down - 600 mg x1 week, 300 mg x1 week, and then DC. 7/8/22     • atenolol (TENORMIN) 25 MG Tab TAKE 1 TABLET BY MOUTH EVERY DAY 90 Tablet 3   • losartan (COZAAR) 100 MG Tab Take 1 Tab by mouth every day. 90 Tab 4   • Cyanocobalamin (B-12) 1000 MCG Cap Take 1 Tab by mouth every day.     • Loperamide HCl (IMODIUM PO) Take 1 Tab by mouth as needed (For diarrhea).     • Cholecalciferol (VITAMIN D3) 5000 UNITS CAPS Take 2,000 Units by mouth every day.     • Coral Calcium 1000 (390 CA) MG TABS Take 1 Each by mouth every day. 60 Each      No current facility-administered medications for this visit.      Current supplements: see MAR  Chronic narcotic pain medicines: no  Allergies: Food, Sulfa drugs, Ace inhibitors, Augmentin, and Nifedipine  Exercise: yes  Current social contact/activities: yes  Current mood: good/calm  Advance Directive on file: no    Screening:  Depression Screening  Little interest or pleasure in doing things?  0 - not at all  Feeling down, depressed , or hopeless? 0 - not at all  Patient Health Questionnaire Score: 0     If depressive symptoms identified deferred to follow up visit unless specifically addressed in assessment and plan.    Interpretation of PHQ-9 Total Score   Score Severity   1-4 No Depression   5-9 Mild Depression   10-14 Moderate Depression   15-19 Moderately Severe Depression   20-27 Severe Depression    Screening for Cognitive Impairment  Three Minute Recall (daughter, heaven, mountain) 2/3    Omkar clock face with all 12 numbers and set the hands to show 10 past 11.  No    Cognitive concerns identified deferred for follow up unless specifically addressed in assessment and plan.    Fall Risk Assessment  Has the patient had two or more falls in the last year or any fall with  injury in the last year?  No    Safety Assessment  Throw rugs on floor.  No  Handrails on all stairs.  Yes  Good lighting in all hallways.  Yes  Difficulty hearing.  No  Patient counseled about all safety risks that were identified.    Functional Assessment ADLs  Are there any barriers preventing you from cooking for yourself or meeting nutritional needs?  No.    Are there any barriers preventing you from driving safely or obtaining transportation?  No.    Are there any barriers preventing you from using a telephone or calling for help?  No.    Are there any barriers preventing you from shopping?  No.    Are there any barriers preventing you from taking care of your own finances?  No.    Are there any barriers preventing you from managing your medications?  No.    Are there any barriers preventing you from showering, bathing or dressing yourself?  No.    Are you currently engaging in any exercise or physical activity?  Yes.     What is your perception of your health?  Good.      Health Maintenance Summary          Overdue - COVID-19 Vaccine (4 - Booster for Pfizer series) Overdue since 4/16/2022 12/16/2021  Imm Admin: PFIZER PURPLE CAP SARS-COV-2 VACCINATION (12+)    02/05/2021  Imm Admin: PFIZER PURPLE CAP SARS-COV-2 VACCINATION (12+)    01/15/2021  Imm Admin: PFIZER PURPLE CAP SARS-COV-2 VACCINATION (12+)          Ordered - MAMMOGRAM (Yearly) Ordered on 7/8/2022 08/02/2021  MA-SCREENING MAMMO BILAT W/TOMOSYNTHESIS W/CAD    12/17/2018  MA-SCREENING MAMMO BILAT W/TOMOSYNTHESIS W/CAD    10/31/2017  MA-MAMMO SCREENING BILAT W/DEEPTI W/CAD    03/02/2016  MA-SCREEN MAMMO W/CAD-BILAT    01/29/2015  MA-SCREENING MAMMOGRAM W/ CAD    Only the first 5 history entries have been loaded, but more history exists.          IMM INFLUENZA (1) Next due on 9/1/2022 11/01/2021  Imm Admin: Influenza Vaccine Adult HD    10/02/2020  Imm Admin: Influenza Vaccine Adult HD    09/16/2019  Imm Admin: Influenza Vaccine Adult HD     10/05/2018  Imm Admin: Influenza Vaccine Adult HD    10/11/2017  Imm Admin: Influenza Vaccine Adult HD    Only the first 5 history entries have been loaded, but more history exists.          IMM DTaP/Tdap/Td Vaccine (2 - Td or Tdap) Next due on 5/22/2023 05/22/2013  Imm Admin: Tdap Vaccine          Annual Wellness Visit (Every 366 Days) Next due on 7/11/2023    07/10/2022  Subsequent Annual Wellness Visit - Includes PPPS ()    07/08/2022  Visit Dx: Encounter for Medicare annual wellness exam    06/21/2021  Visit Dx: Encounter for Medicare annual wellness exam    06/21/2021  Subsequent Annual Wellness Visit - Includes PPPS ()    07/01/2019  Subsequent Annual Wellness Visit - Includes PPPS ()    Only the first 5 history entries have been loaded, but more history exists.          BONE DENSITY (Every 5 Years) Next due on 12/2/2024 12/02/2019  DS-BONE DENSITY STUDY (DEXA)    10/22/2014  DS-BONE DENSITY STUDY (DEXA)    10/19/2012  DS-BONE DENSITY STUDY (DEXA)    02/18/2010  DS-BONE DENSITY STUDY (DEXA)    09/30/2008  DS-BONE DENSITY STUDY (DEXA)          IMM PNEUMOCOCCAL VACCINE: 65+ Years (Series Information) Completed    12/08/2014  Imm Admin: Pneumococcal Conjugate Vaccine (Prevnar/PCV-13)    11/13/2010  Imm Admin: Pneumococcal polysaccharide vaccine (PPSV-23)    01/01/2006  Imm Admin: Pneumococcal polysaccharide vaccine (PPSV-23)          IMM ZOSTER VACCINES (Series Information) Completed    11/02/2018  Imm Admin: Zoster Vaccine Recombinant (RZV) (SHINGRIX)    08/08/2018  Imm Admin: Zoster Vaccine Recombinant (RZV) (SHINGRIX)    12/10/2008  Imm Admin: Zoster Vaccine Live (ZVL) (Zostavax) - HISTORICAL DATA    11/13/2007  Imm Admin: Zoster Vaccine Live (ZVL) (Zostavax) - HISTORICAL DATA          IMM HEP B VACCINE (Series Information) Aged Out    No completion history exists for this topic.          IMM MENINGOCOCCAL VACCINE (MCV4) (Series Information) Aged Out    No completion history exists for  this topic.          Discontinued - PAP SMEAR  Discontinued    No completion history exists for this topic.          Discontinued - COLORECTAL CANCER SCREENING  Discontinued    09/07/2010  REFERRAL TO GI FOR COLONOSCOPY    05/22/2010  COLONOSCOPY (Done)                Patient Care Team:  Ally Barroso M.D. as PCP - General (Family Medicine)  [facility]  as [facility]      Social History     Tobacco Use   • Smoking status: Never Smoker   • Smokeless tobacco: Never Used   Vaping Use   • Vaping Use: Never used   Substance Use Topics   • Alcohol use: Yes     Comment: occ   • Drug use: No     Family History   Problem Relation Age of Onset   • Lung Disease Mother    • Cancer Mother         lung cancer   • Cancer Paternal Aunt         breast     She  has a past medical history of Anxiety, Arthritis (03/12/15), Chickenpox, Degenerative joint disease, Epidermal cyst (10/16/2018), Urdu measles, High cholesterol, HTN (hypertension), benign, Hyperlipemia, Hypertension, Microscopic colitis (2010), Neck pain on left side (5/31/2017), Osteoporosis, Other specified symptom associated with female genital organs, Seborrheic keratosis (10/16/2018), and Snoring.    She has no past medical history of Anesthesia, Cold, or Dental disorder.   Past Surgical History:   Procedure Laterality Date   • HYSTEROSCOPY WITH VIDEO DIAGNOSTIC  3/13/2015    Performed by Alexandria Keenan M.D. [facility]   • DILATION AND CURETTAGE  3/13/2015    Performed by Alexandria Keenan M.D. at [facility]   • BREAST BIOPSY  11/13/08    Performed by ISRAEL HICKS at [facility] X[facility]   • COLON RESECTION  2000    polyps   • APPENDECTOMY     • OTHER NEUROLOGICAL SURG     • TONSILLECTOMY     • US-NEEDLE CORE BX-BREAST PANEL         ROS:    All positives noted in HPI. All others reviewed and are negative.    Ostomy or other tubes or amputations: no  Chronic oxygen use: no  Last eye  "exam: appt in August for annual exam  : denies urinary incontinence; does not interfere with ADLs/ sleep  Gait: stable  Problems with balance/ difficulty walking: no  Hearing: adequate/has hearing aides at home  Dentition: adequate    Lab results 6/15/22 reviewed with patient at visit today.     Exam:   /62 (BP Location: Left arm, Patient Position: Sitting, BP Cuff Size: Adult)   Pulse (!) 57   Temp 36.8 °C (98.3 °F) (Temporal)   Resp 17   Ht 1.626 m (5' 4\")   Wt 87.4 kg (192 lb 9.6 oz)   SpO2 95%  Body mass index is 33.06 kg/m².    Gen: Well developed; well nourished; no acute distress; age appropriate appearance   HEENT: Normocephalic; atraumatic; PEERLA b/l; sclera clear b/l; b/l external auditory canals WNL; b/l TM WNL; nares patent; oropharynx clear; mask in place  Neck: No adenopathy; no thyromegaly  CV: Regular rate and rhythm; S1/ S2 present; no murmur, gallop or rub noted  Pulm: No respiratory distress; clear to ascultation b/l; no wheezing or stridor noted b/l  Abd: Adequate bowel sounds noted; soft and nontender; no rebound, rigidity, nor distention  Extremities: chronic peripheral edema b/l LE extremities/ no clubbing nor cyanosis noted  Skin: Warm and dry; no rashes noted   Neuro: No focal deficits noted; pt is able to get up out of chair unassisted and walk forward  Psych: AAOx4; mood and affect are appropriate    Assessment and Plan:  1. Encounter for screening mammogram for breast cancer  Patient is due for screening mammogram for ongoing medical management.   - MA-SCREENING MAMMO BILAT W/TOMOSYNTHESIS W/CAD; Future  - Subsequent Annual Wellness Visit - Includes PPPS ()    2. Obesity (BMI 30-39.9)  Stable  - Patient identified as having weight management issue.  Appropriate orders and counseling given.  - Subsequent Annual Wellness Visit - Includes PPPS ()    3. Essential hypertension  Stable/ recommend patient continue daily medication use and recommend CBC redraw today (I " suspect recent CBC has lab error) for ongoing management.   - CBC WITH DIFFERENTIAL; Future  - Subsequent Annual Wellness Visit - Includes PPPS ()    4. Stage 3a chronic kidney disease (HCC)  Stable/ recommend repeating CMP in 4 months for ongoing surveillance.  - Comp Metabolic Panel; Future  - Subsequent Annual Wellness Visit - Includes PPPS ()    5. Primary osteoarthritis involving multiple joints  Ongoing issue for patient affecting multiple joints in her body.   - Subsequent Annual Wellness Visit - Includes PPPS ()    6. Seborrheic keratosis  Patient is due for annual skin check, and new referral made to [facility] [place].  - Referral to Dermatology  - Subsequent Annual Wellness Visit - Includes PPPS ()    7. Health care maintenance  Refer to #6  - Referral to Dermatology  - Subsequent Annual Wellness Visit - Includes PPPS ()    8. Mixed hyperlipidemia with apolipoprotein E2 variant  Uncontrolled triglyceride level related to uncontrolled BG. We discussed condition at visit today and recommend patient continue current Lovastatin use, make necessary diet changes, and repeat fasting lipid panel in 4 months for ongoing management.   - Lipid Profile; Future  - Subsequent Annual Wellness Visit - Includes PPPS ()    9. Insulin resistance  Uncontrolled/ refer to #10.  - Subsequent Annual Wellness Visit - Includes PPPS ()    10. Type 2 diabetes mellitus without complication, without long-term current use of insulin ([facility])  New diagnosis for patient - HgA1c is now 6.9% and condition discussed at visit. Patient would like to make necessary diet and exercise adjustments and repeat HgA1c in 4 months. If improvement not seen on repeat HgA1c testing, will consider medication initiation. She has upcoming eye exam appointment in August, and I will do monofilament exam at next visit with patient.   - HEMOGLOBIN A1C; Future  - Comp Metabolic Panel; Future  - Subsequent Annual  Wellness Visit - Includes PPPS ()    11. Vitamin D deficiency disease  Stable/ recommend patient continue current Vitamin D supplementation for maintenance.   - Subsequent Annual Wellness Visit - Includes PPPS ()    12. Osteoporosis, post-menopausal  Stable/ h/o Reclast treatments   - Subsequent Annual Wellness Visit - Includes PPPS ()    13. Sinus bradycardia  Stable/ asymptomatic  - Subsequent Annual Wellness Visit - Includes PPPS ()    14. SHIRA (obstructive sleep apnea)  Stable per report  - Subsequent Annual Wellness Visit - Includes PPPS ()    15. Other microscopic colitis  Stable without recent exacerbation   - Subsequent Annual Wellness Visit - Includes PPPS ()    16. Vaccine counseling  Patient is aware of vaccine eligibility.   - Subsequent Annual Wellness Visit - Includes PPPS ()    17. Encounter for Medicare annual wellness exam  Patient is stable and remains very engaged about medical conditions that need additional attention moving forward.   - Subsequent Annual Wellness Visit - Includes PPPS ()       Services needed: no new services needed at this time  Health Care Screening: recommendations as per orders if indicated.  Referrals offered: Dermatology   Counseling provided today:  · Prevent falls and reduce trip hazards; Secure or remove rugs if present   · Maintain working fire alarm and carbon monoxide detectors   · Engage in regular physical activity daily and social activities weekly as tolerated

## 2022-08-25 ENCOUNTER — OFFICE VISIT (OUTPATIENT)
Dept: SLEEP MEDICINE | Facility: MEDICAL CENTER | Age: 82
End: 2022-08-25
Payer: MEDICARE

## 2022-08-25 VITALS
HEART RATE: 61 BPM | DIASTOLIC BLOOD PRESSURE: 74 MMHG | OXYGEN SATURATION: 96 % | SYSTOLIC BLOOD PRESSURE: 128 MMHG | HEIGHT: 64 IN | RESPIRATION RATE: 16 BRPM | BODY MASS INDEX: 30.9 KG/M2 | WEIGHT: 181 LBS

## 2022-08-25 DIAGNOSIS — Z78.9 NONSMOKER: ICD-10-CM

## 2022-08-25 DIAGNOSIS — G47.33 OBSTRUCTIVE SLEEP APNEA: ICD-10-CM

## 2022-08-25 PROCEDURE — 99214 OFFICE O/P EST MOD 30 MIN: CPT | Performed by: NURSE PRACTITIONER

## 2022-08-25 ASSESSMENT — FIBROSIS 4 INDEX: FIB4 SCORE: 1.36

## 2022-08-25 NOTE — PROGRESS NOTES
Chief Complaint   Patient presents with    Apnea       last seen 8/9/2021        HPI:  Kacie Rubalcava is a 81 y.o. year old female here today for follow-up on SHIRA.    Last OV 8/9/21     Currently using APAP @ 11-15cm H20 nightly; RESMED; device obtained 2020.  Compliance report 7/26/2022 through 8/24/2022 indicates 93% compliance, average nightly use 5 hours 23 minutes, mean pressure 13.3 cm, minimal to moderate mask leak with an overall AHI of 1.4/h.  Reviewed with patient.  Her main complaint is ongoing significant dry mouth.  She is a nasal cup that is pushing on the upper bridge of her nose but also pushing down on her right lip causing her gums to go over her front teeth.  Her dentist is concerned.  She tried CPAP tape that did not stay in place and chinstrap but also slipped.  She previously used a head scarf but noticed the headgear to slip off with use of that.  She denies morning headaches.  Sometimes she will take off her device during the night and be unaware of it.  She tolerates the pressure.  She understands the ongoing benefit of therapy.  She like to have a mask fit performed with a possible full facemask.  She notes the air to not be warm would like her humidifier increase.  She denies cardiac or respiratory symptoms today.  PCP is continue to monitor her A1c level due to risk of diabetes type 2.     Sleep history:  PSG split night study from 9/22/20 indicated severe obstructive sleep apnea, AHI: 83/h with desaturations to 58% SpO2. Successful CPAP titration at 11 cm of water with reduced AHI of 0 and O2 mean 93%.      Patient received the CPAP in November of 2020.      ROS: As per HPI and otherwise negative if not stated.    Past Medical History:   Diagnosis Date    Anxiety     Arthritis 03/12/15    generalized    Chickenpox     Degenerative joint disease     Epidermal cyst 10/16/2018    Pashto measles     High cholesterol     HTN (hypertension), benign     Hyperlipemia     Hypertension      Microscopic colitis 2010    Neck pain on left side 5/31/2017    Osteoporosis     Other specified symptom associated with female genital organs     post menopausal bleeding    Seborrheic keratosis 10/16/2018    Snoring        Past Surgical History:   Procedure Laterality Date    HYSTEROSCOPY WITH VIDEO DIAGNOSTIC  3/13/2015    Performed by Alexandria Keenan M.D. [facility]    DILATION AND CURETTAGE  3/13/2015    Performed by Alexandria Keenan M.D. at [facility]    BREAST BIOPSY  11/13/08    Performed by ISRAEL HICKS at [facility] X[facility]    COLON RESECTION  2000    polyps    APPENDECTOMY      OTHER NEUROLOGICAL SURG      TONSILLECTOMY      US-NEEDLE CORE BX-BREAST PANEL         Family History   Problem Relation Age of Onset    Lung Disease Mother     Cancer Mother         lung cancer    Cancer Paternal Aunt         breast       Social History     Socioeconomic History    Marital status: Single     Spouse name: Not on file    Number of children: Not on file    Years of education: Not on file    Highest education level: Not on file   Occupational History    Not on file   Tobacco Use    Smoking status: Never    Smokeless tobacco: Never   Vaping Use    Vaping Use: Never used   Substance and Sexual Activity    Alcohol use: Yes     Comment: occ    Drug use: No    Sexual activity: Yes     Partners: Male   Other Topics Concern    Not on file   Social History Narrative    Not on file     Social Determinants of Health     Financial Resource Strain: Not on file   Food Insecurity: Not on file   Transportation Needs: Not on file   Physical Activity: Not on file   Stress: Not on file   Social Connections: Not on file   Intimate Partner Violence: Not on file   Housing Stability: Not on file       Allergies as of 08/25/2022 - Reviewed 08/25/2022   Allergen Reaction Noted    Food Anaphylaxis and Vomiting 10/09/2011    Sulfa drugs Anaphylaxis 11/30/2008    Ace inhibitors Cough  "02/27/2015    Augmentin Vomiting 05/11/2018    Nifedipine Swelling 02/27/2015        Vitals:  /74 (BP Location: Left arm, Patient Position: Sitting, BP Cuff Size: Small adult)   Pulse 61   Resp 16   Ht 1.626 m (5' 4\")   Wt 82.1 kg (181 lb)   SpO2 96%     Current medications as of today   Current Outpatient Medications   Medication Sig Dispense Refill    lovastatin (MEVACOR) 10 MG tablet Take 1 Tablet by mouth at bedtime. 100 Tablet 0    losartan (COZAAR) 100 MG Tab Take 1 Tablet by mouth every day. 100 Tablet 3    gabapentin (NEURONTIN) 300 MG Cap 600 mg. Indications: She is titrating down - 600 mg x1 week, 300 mg x1 week, and then DC. 7/8/22      atenolol (TENORMIN) 25 MG Tab TAKE 1 TABLET BY MOUTH EVERY DAY 90 Tablet 3    Cyanocobalamin (B-12) 1000 MCG Cap Take 1 Tab by mouth every day.      Loperamide HCl (IMODIUM PO) Take 1 Tab by mouth as needed (For diarrhea).      Cholecalciferol (VITAMIN D3) 5000 UNITS CAPS Take 2,000 Units by mouth every day.      Coral Calcium 1000 (390 CA) MG TABS Take 1 Each by mouth every day. 60 Each      No current facility-administered medications for this visit.         Physical Exam:   Gen:           Alert and oriented, No apparent distress. Mood and affect appropriate, normal interaction with examiner.  Eyes:          PERRL, EOM intact, sclere white, conjunctive moist.  Ears:          Not examined.   Hearing:     Grossly intact.  Nose:          Normal, no lesions or deformities.  Dentition:    mask  Oropharynx:   mask  Mallampati Classification: mask  Neck:        Supple, trachea midline, no masses.  Respiratory Effort: No intercostal retractions or use of accessory muscles.   Lung Auscultation:      Clear to auscultation bilaterally; no rales, rhonchi or wheezing.  CV:            Regular rate and rhythm. No murmurs, rubs or gallops.  Abd:           Not examined.   Lymphadenopathy: Not examined.  Gait and Station: Normal.  Digits and Nails: No clubbing, cyanosis, " petechiae, or nodes.   Cranial Nerves: II-XII grossly intact.  Skin:        No rashes, lesions or ulcers noted.               Ext:           No cyanosis or edema.      Assessment:  1. Obstructive sleep apnea        2. BMI 31.0-31.9,adult  HEIGHT AND WEIGHT      3. Nonsmoker            Immunizations:    Flu:recommend in the fall  Pneumovax 23:2010  Prevnar 13:2014  PCV 20: not due  COVID-19: 12/16/21, 2/25/21, 1/15/21    Plan:  SHIRA is well controlled on therapy.  She will continue auto CPAP 11 to 15 cm.  She does have ongoing mask issues with significant mouth dryness.  Reviewed remedies and recommend fullface mask fitting.  We also increased her humidifier setting.  She will contact me via [employer] with any further concerns.  DME mask/supplies; mask fit now.  Customer rep Maurizio Barahona information provided patient to contact him to set up this appointment.  Discussed sleep hygiene  Encourage weight loss through healthy eating and regular activity  Follow-up primary care further health concerns including A1c monitoring  Follow-up in 1 year's compliance poor, sooner if needed.    Please note that this dictation was created using voice recognition software. I have made every reasonable attempt to correct obvious errors, but it is possible there are errors of grammar and possibly content that I did not discover before finalizing the note.

## 2022-08-25 NOTE — PATIENT INSTRUCTIONS
Mask fitting at  [facility] : [facility] Phone. [phone] Fax. [phone]    For dry mouth; may start using biotene toothpaste/spray/mouthwash, flynn xyledults

## 2022-08-26 ENCOUNTER — PATIENT MESSAGE (OUTPATIENT)
Dept: SLEEP MEDICINE | Facility: MEDICAL CENTER | Age: 82
End: 2022-08-26
Payer: MEDICARE

## 2022-09-26 RX ORDER — ATENOLOL 25 MG/1
TABLET ORAL
Qty: 90 TABLET | Refills: 3 | Status: SHIPPED | OUTPATIENT
Start: 2022-09-26 | End: 2023-01-18

## 2022-09-30 ENCOUNTER — APPOINTMENT (RX ONLY)
Dept: URBAN - METROPOLITAN AREA CLINIC 22 | Facility: CLINIC | Age: 82
Setting detail: DERMATOLOGY
End: 2022-09-30

## 2022-09-30 DIAGNOSIS — Z71.89 OTHER SPECIFIED COUNSELING: ICD-10-CM

## 2022-09-30 DIAGNOSIS — L29.8 OTHER PRURITUS: ICD-10-CM

## 2022-09-30 DIAGNOSIS — D18.0 HEMANGIOMA: ICD-10-CM

## 2022-09-30 DIAGNOSIS — L57.0 ACTINIC KERATOSIS: ICD-10-CM

## 2022-09-30 DIAGNOSIS — L81.4 OTHER MELANIN HYPERPIGMENTATION: ICD-10-CM

## 2022-09-30 DIAGNOSIS — L29.89 OTHER PRURITUS: ICD-10-CM

## 2022-09-30 DIAGNOSIS — D22 MELANOCYTIC NEVI: ICD-10-CM

## 2022-09-30 DIAGNOSIS — L82.1 OTHER SEBORRHEIC KERATOSIS: ICD-10-CM

## 2022-09-30 PROBLEM — D18.01 HEMANGIOMA OF SKIN AND SUBCUTANEOUS TISSUE: Status: ACTIVE | Noted: 2022-09-30

## 2022-09-30 PROBLEM — D48.5 NEOPLASM OF UNCERTAIN BEHAVIOR OF SKIN: Status: ACTIVE | Noted: 2022-09-30

## 2022-09-30 PROBLEM — D22.5 MELANOCYTIC NEVI OF TRUNK: Status: ACTIVE | Noted: 2022-09-30

## 2022-09-30 PROCEDURE — ? ADDITIONAL NOTES

## 2022-09-30 PROCEDURE — ? BIOPSY BY SHAVE METHOD

## 2022-09-30 PROCEDURE — 11102 TANGNTL BX SKIN SINGLE LES: CPT

## 2022-09-30 PROCEDURE — ? LIQUID NITROGEN

## 2022-09-30 PROCEDURE — ? COUNSELING

## 2022-09-30 PROCEDURE — 17003 DESTRUCT PREMALG LES 2-14: CPT

## 2022-09-30 PROCEDURE — 99203 OFFICE O/P NEW LOW 30 MIN: CPT | Mod: 25

## 2022-09-30 PROCEDURE — ? SUNSCREEN RECOMMENDATIONS

## 2022-09-30 PROCEDURE — 17000 DESTRUCT PREMALG LESION: CPT | Mod: 59

## 2022-09-30 ASSESSMENT — LOCATION SIMPLE DESCRIPTION DERM
LOCATION SIMPLE: ABDOMEN
LOCATION SIMPLE: NOSE
LOCATION SIMPLE: LEFT UPPER BACK
LOCATION SIMPLE: LEFT CHEEK
LOCATION SIMPLE: LEFT FOREARM
LOCATION SIMPLE: RIGHT UPPER BACK

## 2022-09-30 ASSESSMENT — LOCATION DETAILED DESCRIPTION DERM
LOCATION DETAILED: RIGHT MID-UPPER BACK
LOCATION DETAILED: LEFT PROXIMAL DORSAL FOREARM
LOCATION DETAILED: NASAL DORSUM
LOCATION DETAILED: LEFT INFERIOR UPPER BACK
LOCATION DETAILED: NASAL SUPRATIP
LOCATION DETAILED: RIGHT NASAL DORSUM
LOCATION DETAILED: LEFT INFERIOR CENTRAL MALAR CHEEK
LOCATION DETAILED: LEFT LATERAL ABDOMEN

## 2022-09-30 ASSESSMENT — LOCATION ZONE DERM
LOCATION ZONE: FACE
LOCATION ZONE: TRUNK
LOCATION ZONE: ARM
LOCATION ZONE: NOSE

## 2022-09-30 NOTE — PROCEDURE: LIQUID NITROGEN
Detail Level: Detailed
Render Post-Care Instructions In Note?: no
Number Of Freeze-Thaw Cycles: 2 freeze-thaw cycles
Duration Of Freeze Thaw-Cycle (Seconds): 3
Post-Care Instructions: I reviewed with the patient in detail post-care instructions. Patient is to wear sunprotection, and avoid picking at any of the treated lesions. Pt may apply Vaseline to crusted or scabbing areas.
Show Applicator Variable?: Yes
Consent: The patient's consent was obtained including but not limited to risks of crusting, scabbing, blistering, scarring, darker or lighter pigmentary change, recurrence, incomplete removal and infection.

## 2022-09-30 NOTE — PROCEDURE: ADDITIONAL NOTES
Detail Level: Simple
Render Risk Assessment In Note?: no
Additional Notes: There is no rash, dry skin or any other dermatological findings.  Itching is bilateral mid back to front, flexural forearms and palms.   Intermittent.   No pattern to when itching starting.    Benadryl did help her.  Suggest trial of Zyrtec.  She does have hx of arthritis throughout spine She’s already has scheduled orders for blood work to figure out where itching is coming from. Will continue to monitor

## 2022-10-05 DIAGNOSIS — R19.7 DIARRHEA, UNSPECIFIED TYPE: ICD-10-CM

## 2022-10-05 RX ORDER — DIPHENOXYLATE HYDROCHLORIDE AND ATROPINE SULFATE 2.5; .025 MG/1; MG/1
1 TABLET ORAL 4 TIMES DAILY PRN
Qty: 28 TABLET | Refills: 3 | Status: SHIPPED | OUTPATIENT
Start: 2022-10-05 | End: 2022-10-12

## 2022-10-07 ENCOUNTER — HOSPITAL ENCOUNTER (OUTPATIENT)
Dept: RADIOLOGY | Facility: MEDICAL CENTER | Age: 82
End: 2022-10-07
Attending: FAMILY MEDICINE
Payer: MEDICARE

## 2022-10-07 DIAGNOSIS — Z12.31 ENCOUNTER FOR SCREENING MAMMOGRAM FOR BREAST CANCER: ICD-10-CM

## 2022-10-07 PROCEDURE — 77063 BREAST TOMOSYNTHESIS BI: CPT

## 2022-10-14 DIAGNOSIS — K52.838 OTHER MICROSCOPIC COLITIS: ICD-10-CM

## 2022-10-14 RX ORDER — BUDESONIDE 3 MG/1
9 CAPSULE, COATED PELLETS ORAL EVERY MORNING
Qty: 180 CAPSULE | Refills: 0 | Status: SHIPPED | OUTPATIENT
Start: 2022-10-14 | End: 2022-12-13 | Stop reason: SDUPTHER

## 2022-10-17 DIAGNOSIS — K52.838 OTHER MICROSCOPIC COLITIS: ICD-10-CM

## 2022-10-17 DIAGNOSIS — R10.9 CHRONIC ABDOMINAL PAIN: ICD-10-CM

## 2022-10-17 DIAGNOSIS — G89.29 CHRONIC ABDOMINAL PAIN: ICD-10-CM

## 2022-10-17 DIAGNOSIS — R19.7 DIARRHEA, UNSPECIFIED TYPE: ICD-10-CM

## 2022-11-02 ENCOUNTER — DOCUMENTATION (OUTPATIENT)
Dept: HEALTH INFORMATION MANAGEMENT | Facility: OTHER | Age: 82
End: 2022-11-02
Payer: MEDICARE

## 2022-11-07 ENCOUNTER — APPOINTMENT (OUTPATIENT)
Dept: RADIOLOGY | Facility: MEDICAL CENTER | Age: 82
DRG: 312 | End: 2022-11-07
Attending: EMERGENCY MEDICINE
Payer: MEDICARE

## 2022-11-07 ENCOUNTER — HOSPITAL ENCOUNTER (INPATIENT)
Facility: MEDICAL CENTER | Age: 82
LOS: 1 days | DRG: 312 | End: 2022-11-08
Attending: EMERGENCY MEDICINE | Admitting: STUDENT IN AN ORGANIZED HEALTH CARE EDUCATION/TRAINING PROGRAM
Payer: MEDICARE

## 2022-11-07 DIAGNOSIS — I10 HYPERTENSION, UNSPECIFIED TYPE: ICD-10-CM

## 2022-11-07 PROBLEM — I95.9 HYPOTENSION: Status: ACTIVE | Noted: 2022-11-07

## 2022-11-07 LAB
ALBUMIN SERPL BCP-MCNC: 4 G/DL (ref 3.2–4.9)
ALBUMIN/GLOB SERPL: 1.5 G/DL
ALP SERPL-CCNC: 64 U/L (ref 30–99)
ALT SERPL-CCNC: 15 U/L (ref 2–50)
ANION GAP SERPL CALC-SCNC: 11 MMOL/L (ref 7–16)
AST SERPL-CCNC: 17 U/L (ref 12–45)
BASOPHILS # BLD AUTO: 0.4 % (ref 0–1.8)
BASOPHILS # BLD: 0.04 K/UL (ref 0–0.12)
BILIRUB SERPL-MCNC: 0.8 MG/DL (ref 0.1–1.5)
BUN SERPL-MCNC: 18 MG/DL (ref 8–22)
CALCIUM SERPL-MCNC: 8.9 MG/DL (ref 8.5–10.5)
CHLORIDE SERPL-SCNC: 103 MMOL/L (ref 96–112)
CO2 SERPL-SCNC: 27 MMOL/L (ref 20–33)
CREAT SERPL-MCNC: 1.01 MG/DL (ref 0.5–1.4)
EKG IMPRESSION: NORMAL
EOSINOPHIL # BLD AUTO: 0.23 K/UL (ref 0–0.51)
EOSINOPHIL NFR BLD: 2.5 % (ref 0–6.9)
ERYTHROCYTE [DISTWIDTH] IN BLOOD BY AUTOMATED COUNT: 44 FL (ref 35.9–50)
GFR SERPLBLD CREATININE-BSD FMLA CKD-EPI: 55 ML/MIN/1.73 M 2
GLOBULIN SER CALC-MCNC: 2.7 G/DL (ref 1.9–3.5)
GLUCOSE SERPL-MCNC: 87 MG/DL (ref 65–99)
HCT VFR BLD AUTO: 45.2 % (ref 37–47)
HGB BLD-MCNC: 14.9 G/DL (ref 12–16)
IMM GRANULOCYTES # BLD AUTO: 0.02 K/UL (ref 0–0.11)
IMM GRANULOCYTES NFR BLD AUTO: 0.2 % (ref 0–0.9)
LYMPHOCYTES # BLD AUTO: 3.84 K/UL (ref 1–4.8)
LYMPHOCYTES NFR BLD: 42.2 % (ref 22–41)
MAGNESIUM SERPL-MCNC: 2.2 MG/DL (ref 1.5–2.5)
MAGNESIUM SERPL-MCNC: 2.4 MG/DL (ref 1.5–2.5)
MCH RBC QN AUTO: 30.4 PG (ref 27–33)
MCHC RBC AUTO-ENTMCNC: 33 G/DL (ref 33.6–35)
MCV RBC AUTO: 92.2 FL (ref 81.4–97.8)
MONOCYTES # BLD AUTO: 0.71 K/UL (ref 0–0.85)
MONOCYTES NFR BLD AUTO: 7.8 % (ref 0–13.4)
NEUTROPHILS # BLD AUTO: 4.26 K/UL (ref 2–7.15)
NEUTROPHILS NFR BLD: 46.9 % (ref 44–72)
NRBC # BLD AUTO: 0 K/UL
NRBC BLD-RTO: 0 /100 WBC
PHOSPHATE SERPL-MCNC: 3 MG/DL (ref 2.5–4.5)
PHOSPHATE SERPL-MCNC: 3.3 MG/DL (ref 2.5–4.5)
PLATELET # BLD AUTO: 256 K/UL (ref 164–446)
PMV BLD AUTO: 10 FL (ref 9–12.9)
POTASSIUM SERPL-SCNC: 3.6 MMOL/L (ref 3.6–5.5)
PROT SERPL-MCNC: 6.7 G/DL (ref 6–8.2)
RBC # BLD AUTO: 4.9 M/UL (ref 4.2–5.4)
SODIUM SERPL-SCNC: 141 MMOL/L (ref 135–145)
TROPONIN T SERPL-MCNC: 19 NG/L (ref 6–19)
TROPONIN T SERPL-MCNC: 32 NG/L (ref 6–19)
WBC # BLD AUTO: 9.1 K/UL (ref 4.8–10.8)

## 2022-11-07 PROCEDURE — 700105 HCHG RX REV CODE 258: Performed by: EMERGENCY MEDICINE

## 2022-11-07 PROCEDURE — 71045 X-RAY EXAM CHEST 1 VIEW: CPT

## 2022-11-07 PROCEDURE — 84100 ASSAY OF PHOSPHORUS: CPT

## 2022-11-07 PROCEDURE — 36415 COLL VENOUS BLD VENIPUNCTURE: CPT

## 2022-11-07 PROCEDURE — 99285 EMERGENCY DEPT VISIT HI MDM: CPT

## 2022-11-07 PROCEDURE — 93005 ELECTROCARDIOGRAM TRACING: CPT | Performed by: EMERGENCY MEDICINE

## 2022-11-07 PROCEDURE — 700102 HCHG RX REV CODE 250 W/ 637 OVERRIDE(OP): Performed by: EMERGENCY MEDICINE

## 2022-11-07 PROCEDURE — 96372 THER/PROPH/DIAG INJ SC/IM: CPT

## 2022-11-07 PROCEDURE — 700111 HCHG RX REV CODE 636 W/ 250 OVERRIDE (IP): Performed by: EMERGENCY MEDICINE

## 2022-11-07 PROCEDURE — 99223 1ST HOSP IP/OBS HIGH 75: CPT | Mod: AI | Performed by: STUDENT IN AN ORGANIZED HEALTH CARE EDUCATION/TRAINING PROGRAM

## 2022-11-07 PROCEDURE — A9270 NON-COVERED ITEM OR SERVICE: HCPCS | Performed by: EMERGENCY MEDICINE

## 2022-11-07 PROCEDURE — 84484 ASSAY OF TROPONIN QUANT: CPT

## 2022-11-07 PROCEDURE — 93005 ELECTROCARDIOGRAM TRACING: CPT

## 2022-11-07 PROCEDURE — A9270 NON-COVERED ITEM OR SERVICE: HCPCS | Performed by: STUDENT IN AN ORGANIZED HEALTH CARE EDUCATION/TRAINING PROGRAM

## 2022-11-07 PROCEDURE — 83735 ASSAY OF MAGNESIUM: CPT

## 2022-11-07 PROCEDURE — 96374 THER/PROPH/DIAG INJ IV PUSH: CPT

## 2022-11-07 PROCEDURE — 85025 COMPLETE CBC W/AUTO DIFF WBC: CPT

## 2022-11-07 PROCEDURE — 80053 COMPREHEN METABOLIC PANEL: CPT

## 2022-11-07 PROCEDURE — 700102 HCHG RX REV CODE 250 W/ 637 OVERRIDE(OP): Performed by: STUDENT IN AN ORGANIZED HEALTH CARE EDUCATION/TRAINING PROGRAM

## 2022-11-07 PROCEDURE — 770020 HCHG ROOM/CARE - TELE (206)

## 2022-11-07 PROCEDURE — 700111 HCHG RX REV CODE 636 W/ 250 OVERRIDE (IP): Performed by: STUDENT IN AN ORGANIZED HEALTH CARE EDUCATION/TRAINING PROGRAM

## 2022-11-07 RX ORDER — ASPIRIN 81 MG/1
324 TABLET, CHEWABLE ORAL DAILY
Status: DISCONTINUED | OUTPATIENT
Start: 2022-11-07 | End: 2022-11-08

## 2022-11-07 RX ORDER — LOVASTATIN 20 MG/1
10 TABLET ORAL
Status: DISCONTINUED | OUTPATIENT
Start: 2022-11-07 | End: 2022-11-08 | Stop reason: HOSPADM

## 2022-11-07 RX ORDER — BUDESONIDE 3 MG/1
9 CAPSULE, COATED PELLETS ORAL EVERY MORNING
Status: DISCONTINUED | OUTPATIENT
Start: 2022-11-08 | End: 2022-11-08 | Stop reason: HOSPADM

## 2022-11-07 RX ORDER — ENOXAPARIN SODIUM 100 MG/ML
40 INJECTION SUBCUTANEOUS DAILY
Status: DISCONTINUED | OUTPATIENT
Start: 2022-11-07 | End: 2022-11-08 | Stop reason: HOSPADM

## 2022-11-07 RX ORDER — ASPIRIN 300 MG/1
300 SUPPOSITORY RECTAL DAILY
Status: DISCONTINUED | OUTPATIENT
Start: 2022-11-07 | End: 2022-11-08

## 2022-11-07 RX ORDER — SODIUM CHLORIDE 9 MG/ML
1000 INJECTION, SOLUTION INTRAVENOUS ONCE
Status: COMPLETED | OUTPATIENT
Start: 2022-11-07 | End: 2022-11-07

## 2022-11-07 RX ORDER — ASPIRIN 81 MG/1
324 TABLET, CHEWABLE ORAL ONCE
Status: COMPLETED | OUTPATIENT
Start: 2022-11-07 | End: 2022-11-07

## 2022-11-07 RX ORDER — HYDRALAZINE HYDROCHLORIDE 20 MG/ML
20 INJECTION INTRAMUSCULAR; INTRAVENOUS ONCE
Status: COMPLETED | OUTPATIENT
Start: 2022-11-07 | End: 2022-11-07

## 2022-11-07 RX ORDER — ACETAMINOPHEN 325 MG/1
650 TABLET ORAL EVERY 6 HOURS PRN
Status: DISCONTINUED | OUTPATIENT
Start: 2022-11-07 | End: 2022-11-08 | Stop reason: HOSPADM

## 2022-11-07 RX ORDER — ASPIRIN 325 MG
325 TABLET ORAL DAILY
Status: DISCONTINUED | OUTPATIENT
Start: 2022-11-07 | End: 2022-11-08

## 2022-11-07 RX ADMIN — ENOXAPARIN SODIUM 40 MG: 40 INJECTION SUBCUTANEOUS at 21:09

## 2022-11-07 RX ADMIN — ASPIRIN 81 MG 324 MG: 81 TABLET ORAL at 19:47

## 2022-11-07 RX ADMIN — SODIUM CHLORIDE 1000 ML: 9 INJECTION, SOLUTION INTRAVENOUS at 15:08

## 2022-11-07 RX ADMIN — HYDRALAZINE HYDROCHLORIDE 20 MG: 20 INJECTION INTRAMUSCULAR; INTRAVENOUS at 14:24

## 2022-11-07 RX ADMIN — LOVASTATIN 10 MG: 20 TABLET ORAL at 21:09

## 2022-11-07 ASSESSMENT — FIBROSIS 4 INDEX: FIB4 SCORE: 1.38

## 2022-11-07 NOTE — ED TRIAGE NOTES
Chief Complaint   Patient presents with   • Blood Pressure Problem     Pt presents by [facility] from [facility] for hypertension following colonoscopy. Pt has been experiencing non bloody diarrhea for several months and had procedure to further investigate. Pt has history of HTN and took her BP meds last night. Pt received propofol 100 mg IV and Versed 1 mg IV for procedure. Pt was given 5mg IV metoprolol for HTN. Pt also had some bradycardia as low as 41.    Please see some of the BP readings from procedure:  267/94  268/88  274/77  283/88

## 2022-11-07 NOTE — ED PROVIDER NOTES
ED Provider Note    CHIEF COMPLAINT  Chief Complaint   Patient presents with    Blood Pressure Problem       HPI  Kacie Rubalcava is a 82 y.o. female who presents with hypertension.  Patient was undergoing [facility] at [facility] for subacute diarrhea, following the procedure patient developed significant hypertension, she was given IV metoprolol and her hypertension persisted but her heart rate dropped to around 48.  Patient has a longstanding history of bradycardia.  Patient denies any associated chest pain, shortness of breath, palpitations, back pain, neck pain nausea, vomiting, headache weakness or numbness now, prior to the procedure or after the procedure.  Patient has no complaints at all.  She reports she has similar spike in her blood pressure at her last colonoscopy.    REVIEW OF SYSTEMS  ROS    See HPI for further details. All other systems are negative.     PAST MEDICAL HISTORY   has a past medical history of Anxiety, Arthritis (03/12/15), Chickenpox, Degenerative joint disease, Epidermal cyst (10/16/2018), [place] measles, High cholesterol, HTN (hypertension), benign, Hyperlipemia, Hypertension, Microscopic colitis (2010), Neck pain on left side (5/31/2017), Osteoporosis, Other specified symptom associated with female genital organs, Seborrheic keratosis (10/16/2018), and Snoring.    SOCIAL HISTORY  Social History     Tobacco Use    Smoking status: Never    Smokeless tobacco: Never   Vaping Use    Vaping Use: Never used   Substance and Sexual Activity    Alcohol use: Yes     Comment: occ    Drug use: No    Sexual activity: Yes     Partners: Male       SURGICAL HISTORY   has a past surgical history that includes tonsillectomy; us-needle core bx-breast panel; breast biopsy (11/13/08); hysteroscopy with video diagnostic (3/13/2015); dilation and curettage (3/13/2015); appendectomy; colon resection (2000); and other neurological surg.    CURRENT MEDICATIONS  Home Medications       Reviewed by  Kaya Augustine R.N. (Registered Nurse) on 11/07/22 at 1250  Med List Status: <None>     Medication Last Dose Status   atenolol (TENORMIN) 25 MG Tab  Active   budesonide (ENTOCORT EC) 3 MG Cap DR Particles capsule  Active   Cholecalciferol (VITAMIN D3) 5000 UNITS CAPS  Active   Coral Calcium 1000 (390 CA) MG TABS  Active   Cyanocobalamin (B-12) 1000 MCG Cap  Active   gabapentin (NEURONTIN) 300 MG Cap  Active   Loperamide HCl (IMODIUM PO)  Active   losartan (COZAAR) 100 MG Tab  Active   lovastatin (MEVACOR) 10 MG tablet  Active                    ALLERGIES  Allergies   Allergen Reactions    Food Anaphylaxis and Vomiting     mussels    Sulfa Drugs Anaphylaxis    Ace Inhibitors Cough    Augmentin Vomiting    Nifedipine Swelling     Leg edema       PHYSICAL EXAM  Vitals:    11/07/22 1247   BP: (!) 207/81   Pulse: (!) 50   Resp: 16   Temp: 36.3 °C (97.3 °F)   SpO2: 95%       Physical Exam  Constitutional:       Appearance: She is well-developed.   HENT:      Head: Normocephalic and atraumatic.   Eyes:      Conjunctiva/sclera: Conjunctivae normal.   Cardiovascular:      Rate and Rhythm: Normal rate and regular rhythm.   Pulmonary:      Effort: Pulmonary effort is normal.      Breath sounds: Normal breath sounds.   Abdominal:      General: Bowel sounds are normal. There is no distension.      Palpations: Abdomen is soft.      Tenderness: There is no abdominal tenderness. There is no rebound.   Musculoskeletal:      Cervical back: Normal range of motion and neck supple.   Skin:     General: Skin is warm and dry.      Findings: No rash.   Neurological:      Mental Status: She is alert and oriented to person, place, and time.   Psychiatric:         Behavior: Behavior normal.         DIAGNOSTIC STUDIES / PROCEDURES    EKG  See below    LABS  Results for orders placed or performed during the hospital encounter of 11/07/22   CBC WITH DIFFERENTIAL   Result Value Ref Range    WBC 9.1 4.8 - 10.8 K/uL    RBC 4.90 4.20 - 5.40 M/uL     Hemoglobin 14.9 12.0 - 16.0 g/dL    Hematocrit 45.2 37.0 - 47.0 %    MCV 92.2 81.4 - 97.8 fL    MCH 30.4 27.0 - 33.0 pg    MCHC 33.0 (L) 33.6 - 35.0 g/dL    RDW 44.0 35.9 - 50.0 fL    Platelet Count 256 164 - 446 K/uL    MPV 10.0 9.0 - 12.9 fL    Neutrophils-Polys 46.90 44.00 - 72.00 %    Lymphocytes 42.20 (H) 22.00 - 41.00 %    Monocytes 7.80 0.00 - 13.40 %    Eosinophils 2.50 0.00 - 6.90 %    Basophils 0.40 0.00 - 1.80 %    Immature Granulocytes 0.20 0.00 - 0.90 %    Nucleated RBC 0.00 /100 WBC    Neutrophils (Absolute) 4.26 2.00 - 7.15 K/uL    Lymphs (Absolute) 3.84 1.00 - 4.80 K/uL    Monos (Absolute) 0.71 0.00 - 0.85 K/uL    Eos (Absolute) 0.23 0.00 - 0.51 K/uL    Baso (Absolute) 0.04 0.00 - 0.12 K/uL    Immature Granulocytes (abs) 0.02 0.00 - 0.11 K/uL    NRBC (Absolute) 0.00 K/uL   CMP   Result Value Ref Range    Sodium 141 135 - 145 mmol/L    Potassium 3.6 3.6 - 5.5 mmol/L    Chloride 103 96 - 112 mmol/L    Co2 27 20 - 33 mmol/L    Anion Gap 11.0 7.0 - 16.0    Glucose 87 65 - 99 mg/dL    Bun 18 8 - 22 mg/dL    Creatinine 1.01 0.50 - 1.40 mg/dL    Calcium 8.9 8.5 - 10.5 mg/dL    AST(SGOT) 17 12 - 45 U/L    ALT(SGPT) 15 2 - 50 U/L    Alkaline Phosphatase 64 30 - 99 U/L    Total Bilirubin 0.8 0.1 - 1.5 mg/dL    Albumin 4.0 3.2 - 4.9 g/dL    Total Protein 6.7 6.0 - 8.2 g/dL    Globulin 2.7 1.9 - 3.5 g/dL    A-G Ratio 1.5 g/dL   TROPONIN   Result Value Ref Range    Troponin T 19 6 - 19 ng/L   MAGNESIUM   Result Value Ref Range    Magnesium 2.4 1.5 - 2.5 mg/dL   PHOSPHORUS   Result Value Ref Range    Phosphorus 3.3 2.5 - 4.5 mg/dL   ESTIMATED GFR   Result Value Ref Range    GFR (CKD-EPI) 55 (A) >60 mL/min/1.73 m 2   TROPONIN   Result Value Ref Range    Troponin T 32 (H) 6 - 19 ng/L   EKG   Result Value Ref Range    Report       [facility] Emergency Dept.    Test Date:  2022-11-07  Pt Name:    LA NENA SOLO                Department: ER  MRN:        [id number]                      Room:         07  Gender:     Female                       Technician: [id number]  :        [id number]                   Requested By:ER TRIAGE PROTOCOL  Order #:    [id number]                    Reading MD: Toshia Ayoub MD    Measurements  Intervals                                Axis  Rate:       50                           P:          24  WV:         189                          QRS:        -38  QRSD:       97                           T:          16  QT:         501  QTc:        457    Interpretive Statements  EKG is sinus bradycardia, normal axis normal intervals no ST changes  concerning  for acute regional ischemia  Electronically Signed On 2022 14:01:30 PST by Toshia Ayoub MD           RADIOLOGY  DX-CHEST-PORTABLE (1 VIEW)   Final Result      Bibasilar atelectasis, less likely pneumonia              COURSE & MEDICAL DECISION MAKING  Pertinent Labs & Imaging studies reviewed. (See chart for details)    Patient here, asymptomatic with significantly elevated blood pressure.  Patient's blood pressure was elevated to 260 upon arrival.  Though she is asymptomatic the incredibly elevated blood pressure is concerning and therefore I do believe that urgent management is necessary.  I have given patient hydralazine.  I believe patient's bradycardia is secondary to patient's longstanding history of bradycardia which was exacerbated by the IV metoprolol.  We will check basic labs for possible electrolyte abnormality, or evidence of endorgan failure, will also check troponin.  Patient significantly hypotensive following hydralazine.  She reports having some chest tightness following this.  Repeat EKG reveals ongoing bradycardia with some new PVCs and no evidence of ST elevation.  Will repeat troponin.  Troponin is significantly elevated from prior.  Patient will be given aspirin.  X-ray was checked, she does not have a overtly widened mediastinum or significant change from her prior x-ray.  Patient with equal pulses in all 4  extremities.  She denies any associated back pain.  I have a low suspicion that patient has a dissection.  We will continue to trend troponin  I discussed the case with hospitalist who has agreed to admit.      FINAL IMPRESSION    1. Hypertension, unspecified type        Chest pain, elevated troponin      Electronically signed by: Mega Pope M.D., 11/7/2022 1:02 PM

## 2022-11-08 ENCOUNTER — APPOINTMENT (OUTPATIENT)
Dept: CARDIOLOGY | Facility: MEDICAL CENTER | Age: 82
DRG: 312 | End: 2022-11-08
Attending: STUDENT IN AN ORGANIZED HEALTH CARE EDUCATION/TRAINING PROGRAM
Payer: MEDICARE

## 2022-11-08 VITALS
HEIGHT: 64 IN | BODY MASS INDEX: 28.68 KG/M2 | SYSTOLIC BLOOD PRESSURE: 155 MMHG | DIASTOLIC BLOOD PRESSURE: 65 MMHG | TEMPERATURE: 98.4 F | HEART RATE: 83 BPM | RESPIRATION RATE: 18 BRPM | WEIGHT: 167.99 LBS | OXYGEN SATURATION: 93 %

## 2022-11-08 LAB
LV EJECT FRACT  99904: 65
LV EJECT FRACT MOD 2C 99903: 46.85
LV EJECT FRACT MOD 4C 99902: 76.34
LV EJECT FRACT MOD BP 99901: 61.85
TROPONIN T SERPL-MCNC: 59 NG/L (ref 6–19)

## 2022-11-08 PROCEDURE — 93306 TTE W/DOPPLER COMPLETE: CPT | Mod: 26 | Performed by: INTERNAL MEDICINE

## 2022-11-08 PROCEDURE — A9270 NON-COVERED ITEM OR SERVICE: HCPCS | Performed by: STUDENT IN AN ORGANIZED HEALTH CARE EDUCATION/TRAINING PROGRAM

## 2022-11-08 PROCEDURE — 93306 TTE W/DOPPLER COMPLETE: CPT

## 2022-11-08 PROCEDURE — 700102 HCHG RX REV CODE 250 W/ 637 OVERRIDE(OP): Performed by: STUDENT IN AN ORGANIZED HEALTH CARE EDUCATION/TRAINING PROGRAM

## 2022-11-08 PROCEDURE — 99239 HOSP IP/OBS DSCHRG MGMT >30: CPT | Performed by: STUDENT IN AN ORGANIZED HEALTH CARE EDUCATION/TRAINING PROGRAM

## 2022-11-08 PROCEDURE — 84484 ASSAY OF TROPONIN QUANT: CPT

## 2022-11-08 RX ORDER — ATENOLOL 25 MG/1
25 TABLET ORAL
Status: DISCONTINUED | OUTPATIENT
Start: 2022-11-08 | End: 2022-11-08 | Stop reason: HOSPADM

## 2022-11-08 RX ORDER — ATENOLOL 25 MG/1
25 TABLET ORAL
Status: DISCONTINUED | OUTPATIENT
Start: 2022-11-08 | End: 2022-11-08

## 2022-11-08 RX ADMIN — BUDESONIDE 9 MG: 3 CAPSULE, GELATIN COATED ORAL at 05:44

## 2022-11-08 RX ADMIN — ATENOLOL 25 MG: 25 TABLET ORAL at 09:46

## 2022-11-08 ASSESSMENT — ENCOUNTER SYMPTOMS
SPUTUM PRODUCTION: 0
SENSORY CHANGE: 0
BLURRED VISION: 0
SINUS PAIN: 0
SPEECH CHANGE: 0
CHILLS: 0
DOUBLE VISION: 0
DIARRHEA: 0
FEVER: 0
NAUSEA: 0
FALLS: 0
FOCAL WEAKNESS: 0
COUGH: 0
PALPITATIONS: 0
ABDOMINAL PAIN: 0
SHORTNESS OF BREATH: 0
BACK PAIN: 0
NERVOUS/ANXIOUS: 0
VOMITING: 0

## 2022-11-08 ASSESSMENT — PATIENT HEALTH QUESTIONNAIRE - PHQ9
1. LITTLE INTEREST OR PLEASURE IN DOING THINGS: NOT AT ALL
SUM OF ALL RESPONSES TO PHQ9 QUESTIONS 1 AND 2: 0
2. FEELING DOWN, DEPRESSED, IRRITABLE, OR HOPELESS: NOT AT ALL

## 2022-11-08 ASSESSMENT — COGNITIVE AND FUNCTIONAL STATUS - GENERAL
DAILY ACTIVITIY SCORE: 24
MOBILITY SCORE: 24
SUGGESTED CMS G CODE MODIFIER MOBILITY: CH
SUGGESTED CMS G CODE MODIFIER DAILY ACTIVITY: CH

## 2022-11-08 ASSESSMENT — LIFESTYLE VARIABLES
CONSUMPTION TOTAL: NEGATIVE
HAVE PEOPLE ANNOYED YOU BY CRITICIZING YOUR DRINKING: NO
HOW MANY TIMES IN THE PAST YEAR HAVE YOU HAD 5 OR MORE DRINKS IN A DAY: 0
ALCOHOL_USE: NO
ON A TYPICAL DAY WHEN YOU DRINK ALCOHOL HOW MANY DRINKS DO YOU HAVE: 0
SUBSTANCE_ABUSE: 0
TOTAL SCORE: 0
HAVE YOU EVER FELT YOU SHOULD CUT DOWN ON YOUR DRINKING: NO
TOTAL SCORE: 0
AVERAGE NUMBER OF DAYS PER WEEK YOU HAVE A DRINK CONTAINING ALCOHOL: 0
TOTAL SCORE: 0
EVER HAD A DRINK FIRST THING IN THE MORNING TO STEADY YOUR NERVES TO GET RID OF A HANGOVER: NO
EVER FELT BAD OR GUILTY ABOUT YOUR DRINKING: NO

## 2022-11-08 ASSESSMENT — FIBROSIS 4 INDEX: FIB4 SCORE: 1.42

## 2022-11-08 NOTE — PROGRESS NOTES
4 Eyes Skin Assessment Completed by Ruel RN and BRITTANEY Kaur.    Head WDL  Ears WDL  Nose WDL  Mouth WDL  Neck WDL  Breast/Chest WDL  Shoulder Blades WDL  Spine WDL  (R) Arm/Elbow/Hand WDL  (L) Arm/Elbow/Hand WDL  Abdomen WDL  Groin WDL  Scrotum/Coccyx/Buttocks WDL  (R) Leg Edema  (L) Leg Edema  (R) Heel/Foot/Toe WDL  (L) Heel/Foot/Toe WDL          Devices In Places Tele Box, Blood Pressure Cuff, and Pulse Ox      Interventions In Place N/A    Possible Skin Injury No    Pictures Uploaded Into Epic N/A  Wound Consult Placed N/A  RN Wound Prevention Protocol Ordered No

## 2022-11-08 NOTE — ASSESSMENT & PLAN NOTE
Home regimen losartan 100 mg daily, atenolol 25 mg daily.  With history of asymptomatic bradycardia, received metoprolol today heart rate down to 30s, her systolic blood pressures ranged from .  Will hold off any antihypertensives at this time, she has demonstrated significant blood pressure lability and I favor monitoring every 4 hours for now and watching what her trend does.  Would be very cautious with beta-blockers given demonstrated bradycardia into the 30s, also cautious with hydralazine given she went from 260 down to 70 systolic with one 20 mg dose.

## 2022-11-08 NOTE — CARE PLAN
The patient is Stable - Low risk of patient condition declining or worsening         Progress made toward(s) clinical / shift goals:    Problem: Knowledge Deficit - Standard  Goal: Patient and family/care givers will demonstrate understanding of plan of care, disease process/condition, diagnostic tests and medications  Outcome: Progressing     Problem: Respiratory  Goal: Patient will achieve/maintain optimum respiratory ventilation and gas exchange  Outcome: Progressing     Problem: Fluid Volume  Goal: Fluid volume balance will be maintained  Outcome: Progressing       Patient is not progressing towards the following goals:

## 2022-11-08 NOTE — ED NOTES
Paramedic ambulated patient. Pt stated she felt weak with standing and walking but had steady gait. Dr. Pope aware. Pt complained of some discomfort in her chest/throat area. EKG repeated and troponin sent. CXR to be performed. Pending results.

## 2022-11-08 NOTE — ASSESSMENT & PLAN NOTE
Longstanding problem normally asymptomatic.  Received IV metoprolol which dropped heart rate down to the 30s.  Avoid AV nory blocking medications.  No longer bradycardic.  Monitor on telemetry  Fall precautions

## 2022-11-08 NOTE — ASSESSMENT & PLAN NOTE
Iatrogenic, following hydralazine 20 mg IV brought blood pressure from 260s down to 70 systolic.  Responded to IV fluids.  Currently systolic blood pressure in the 150s, will hold off on her losartan and atenolol at this time and favor every 4 hours vitals and watching the trend given her BPs demonstrated lability.

## 2022-11-08 NOTE — DISCHARGE PLANNING
[facility] TCN chart review completed. Collaborated with discharge planning team, JLUIS Huang prior to meeting with the patient. Noted patient is an anticipated discharge today.     The most current review of medical record, knowledge of pt's PLOF and social support, LACE+ score of 64, 6 clicks scores of 24 ADL and 24 mobility were considered.      TCN met with patient at bedside. Introduced self and TCN program. Provided education regarding post acute levels of care. Discussed [facility] plan benefits (Meds to Beds, medical uber and [facility] transitional care). Patient verbalized understanding.    Patient endorsed she is at her baseline level of function, stating independence in ADL, IADL and driving. Patient noted she is hoping to establish care through [facility], requesting a Gerontologist at [facility].  TCN provided assistance [phone] with appointment scheduled for tomorrow, Wednesday, November 9th, 2022 at 8:25 (check-in) 8:45 am appointment. Information provided to patient at bedside.  Patient noted she may not be able to attend appointment as scheduled. Number provided to patient to allow for rescheduling if necessary.     Given patient is at her baseline level of function, no provider consults or choice indicated at this time. Referral sent to [facility], in the setting of patient potential need for transitional care should she not be able to attend primary care appointment as scheduled.    As patient is an anticipated discharge today, TCN remains available to further collaborate should needs arise necessitating TCN involvement prior to patient dc. Thank you.     Completed today:  Choice obtained: none; patient endorsed she is at her baseline level of function stating no concerns regarding functional mobility  [facility] referral sent 11/8/2022

## 2022-11-08 NOTE — H&P
Hospital Medicine History & Physical Note    Date of Service  11/7/2022    Primary Care Physician  Ally Barroso M.D.    Consultants  None    Code Status  Full Code    Chief Complaint  Chief Complaint   Patient presents with    Blood Pressure Problem       History of Presenting Illness  Kacie Rubalcava is a 82 y.o. female history of hypertension, hyperlipidemia, anxiety, chronic diarrhea secondary to microscopic colitis, DJD, osteoporosis, asymptomatic resting bradycardia, who presented 11/7/2022 with heart rate and blood pressure abnormalities.    Patient went for colonoscopy today and she notes prior to that she was quite hypertensive up to the 200s, persisted following procedure.  She was given IV metoprolol and her heart rate dropped to the 30s, she was persistently hypertensive.  There were no chest pain dyspnea palpitations nausea vomiting abdominal pain headache weakness or numbness prior to or after the procedure.  No complaints on presentation to the ED.  She has been in her usual state of health prior to today's events.    Arrival to the ED patient's blood pressure elevated to 260, asymptomatic.  20 mg IV hydralazine given which dropped patient's blood pressure down to the 70s with mild reported chest pressure with hypotension.  Repeat EKG showed sinus bradycardia axis normal intervals, PVCs, no ST elevation.  She was given IV fluid and blood pressure responded to the 150s.  Her initial troponin was 19, repeat 32.  Otherwise labs CBC is unremarkable, CMP is unremarkable, she has normal room air oxygen saturation and respiratory rate.  Heart rate is ranged from 40s to 80s while here.    Due to very wide fluctuations of blood pressure, episode of hypotension with chest discomfort and elevated troponin, patient definitely referred to hospitalist for admission.    I discussed the plan of care with patient, family, bedside RN, and pharmacy.    Review of Systems  Review of Systems   Constitutional:   Negative for chills and fever.   HENT:  Negative for congestion and sinus pain.    Eyes:  Negative for blurred vision and double vision.   Respiratory:  Negative for cough, sputum production and shortness of breath.    Cardiovascular:  Positive for chest pain. Negative for palpitations and leg swelling.   Gastrointestinal:  Negative for abdominal pain, diarrhea, nausea and vomiting.   Genitourinary:  Negative for dysuria, frequency and urgency.   Musculoskeletal:  Negative for back pain and falls.   Neurological:  Negative for sensory change, speech change and focal weakness.   Psychiatric/Behavioral:  Negative for substance abuse. The patient is not nervous/anxious.      Past Medical History   has a past medical history of Anxiety, Arthritis (03/12/15), Chickenpox, Degenerative joint disease, Epidermal cyst (10/16/2018), [place] measles, High cholesterol, HTN (hypertension), benign, Hyperlipemia, Hypertension, Microscopic colitis (2010), Neck pain on left side (5/31/2017), Osteoporosis, Other specified symptom associated with female genital organs, Seborrheic keratosis (10/16/2018), and Snoring.    Surgical History   has a past surgical history that includes tonsillectomy; us-needle core bx-breast panel; breast biopsy (11/13/08); hysteroscopy with video diagnostic (3/13/2015); dilation and curettage (3/13/2015); appendectomy; colon resection (2000); and other neurological surg.     Family History  family history includes Cancer in her mother and paternal aunt; Lung Disease in her mother.       Social History   reports that she has never smoked. She has never used smokeless tobacco. She reports that she does not currently use alcohol. She reports that she does not use drugs.    Allergies  Allergies   Allergen Reactions    Food Anaphylaxis and Vomiting     mussels    Sulfa Drugs Anaphylaxis    Augmentin Vomiting    Nifedipine Swelling     Leg edema       Medications  Prior to Admission Medications   Prescriptions Last  Dose Informant Patient Reported? Taking?   Cholecalciferol (VITAMIN D3) 5000 UNITS CAPS 11/4/2022 at PM Patient Yes No   Sig: Take 2,000 Units by mouth every day.   Coral Calcium 1000 (390 CA) MG TABS 11/4/2022 at PM Patient Yes No   Sig: Take 1 Each by mouth every day.   Loperamide HCl (IMODIUM PO) PRN at PRN Patient Yes No   Sig: Take 1 Tab by mouth as needed (For diarrhea).   atenolol (TENORMIN) 25 MG Tab 11/6/2022 at PM Patient No No   Sig: TAKE 1 TABLET BY MOUTH EVERY DAY   budesonide (ENTOCORT EC) 3 MG Cap DR Particles capsule 11/6/2022 at AM Patient No No   Sig: Take 3 Capsules by mouth every morning for 60 days.   losartan (COZAAR) 100 MG Tab 11/6/2022 at PM Patient No No   Sig: Take 1 Tablet by mouth every day.   lovastatin (MEVACOR) 10 MG tablet 11/6/2022 at PM Patient No No   Sig: Take 1 Tablet by mouth at bedtime.      Facility-Administered Medications: None       Physical Exam  Temp:  [36.3 °C (97.3 °F)-36.5 °C (97.7 °F)] 36.5 °C (97.7 °F)  Pulse:  [45-89] 89  Resp:  [12-30] 16  BP: ()/(35-95) 151/79  SpO2:  [91 %-97 %] 91 %  Blood Pressure : (!) 151/79   Temperature: 36.5 °C (97.7 °F)   Pulse: 89   Respiration: 16   Pulse Oximetry: 91 %       Physical Exam  Vitals and nursing note reviewed. Exam conducted with a chaperone present.   Constitutional:       General: She is not in acute distress.     Appearance: She is not toxic-appearing.      Comments: 82-year-old female appears stated age alert and conversant able to speak full sentences no acute distress nontoxic-appearing pleasant mood   HENT:      Head: Normocephalic and atraumatic.      Nose: Nose normal. No rhinorrhea.      Mouth/Throat:      Mouth: Mucous membranes are moist.      Pharynx: Oropharynx is clear.   Eyes:      General: No scleral icterus.     Extraocular Movements: Extraocular movements intact.      Conjunctiva/sclera: Conjunctivae normal.      Pupils: Pupils are equal, round, and reactive to light.   Cardiovascular:      Rate  and Rhythm: Normal rate and regular rhythm.      Pulses: Normal pulses.   Pulmonary:      Effort: Pulmonary effort is normal. No respiratory distress.      Breath sounds: Normal breath sounds. No wheezing, rhonchi or rales.   Abdominal:      Palpations: Abdomen is soft.      Tenderness: There is no abdominal tenderness. There is no guarding or rebound.   Musculoskeletal:         General: No tenderness or deformity. Normal range of motion.      Cervical back: Normal range of motion and neck supple. No rigidity or tenderness.   Skin:     General: Skin is warm and dry.      Capillary Refill: Capillary refill takes less than 2 seconds.      Findings: No erythema or rash.   Neurological:      General: No focal deficit present.      Mental Status: She is alert and oriented to person, place, and time. Mental status is at baseline.      Cranial Nerves: No cranial nerve deficit.      Sensory: No sensory deficit.      Motor: No weakness.      Coordination: Coordination normal.   Psychiatric:         Mood and Affect: Mood normal.         Behavior: Behavior normal.         Thought Content: Thought content normal.         Judgment: Judgment normal.       Laboratory:  Recent Labs     11/07/22  1300   WBC 9.1   RBC 4.90   HEMOGLOBIN 14.9   HEMATOCRIT 45.2   MCV 92.2   MCH 30.4   MCHC 33.0*   RDW 44.0   PLATELETCT 256   MPV 10.0     Recent Labs     11/07/22  1300   SODIUM 141   POTASSIUM 3.6   CHLORIDE 103   CO2 27   GLUCOSE 87   BUN 18   CREATININE 1.01   CALCIUM 8.9     Recent Labs     11/07/22  1300   ALTSGPT 15   ASTSGOT 17   ALKPHOSPHAT 64   TBILIRUBIN 0.8   GLUCOSE 87         No results for input(s): NTPROBNP in the last 72 hours.      Recent Labs     11/07/22  1300 11/07/22  1745   TROPONINT 19 32*       Imaging:  DX-CHEST-PORTABLE (1 VIEW)   Final Result      Bibasilar atelectasis, less likely pneumonia      EC-ECHOCARDIOGRAM COMPLETE W/O CONT    (Results Pending)       EKG:  I have personally reviewed the images and  compared with prior images. and My impression is: EKG showed sinus bradycardia axis normal intervals, PVCs, no ST elevation    Assessment/Plan:  Justification for Admission Status  I anticipate this patient will require at least two midnights for appropriate medical management, necessitating inpatient admission because hypotension      * Hypotension- (present on admission)  Assessment & Plan  Iatrogenic, following hydralazine 20 mg IV brought blood pressure from 260s down to 70 systolic.  Responded to IV fluids.  Currently systolic blood pressure in the 150s, will hold off on her losartan and atenolol at this time and favor every 4 hours vitals and watching the trend given her BPs demonstrated lability.    Essential hypertension- (present on admission)  Assessment & Plan  Home regimen losartan 100 mg daily, atenolol 25 mg daily.  With history of asymptomatic bradycardia, received metoprolol today heart rate down to 30s, her systolic blood pressures ranged from .  Will hold off any antihypertensives at this time, she has demonstrated significant blood pressure lability and I favor monitoring every 4 hours for now and watching what her trend does.  Would be very cautious with beta-blockers given demonstrated bradycardia into the 30s, also cautious with hydralazine given she went from 260 down to 70 systolic with one 20 mg dose.    Mixed hyperlipidemia with apolipoprotein E2 variant- (present on admission)  Assessment & Plan  Continue statin    Sinus bradycardia- (present on admission)  Assessment & Plan  Longstanding problem normally asymptomatic.  Received IV metoprolol which dropped heart rate down to the 30s.  Avoid AV nory blocking medications.  No longer bradycardic.  Monitor on telemetry  Fall precautions      VTE prophylaxis: SCDs/TEDs and enoxaparin ppx

## 2022-11-09 NOTE — HOSPITAL COURSE
Patient went for colonoscopy today and she notes prior to that she was quite hypertensive up to the 200s, persisted following procedure.  She was given IV metoprolol and her heart rate dropped to the 30s, she was persistently hypertensive.  There were no chest pain dyspnea palpitations nausea vomiting abdominal pain headache weakness or numbness prior to or after the procedure.  No complaints on presentation to the ED.  She has been in her usual state of health prior to today's events.     Arrival to the ED patient's blood pressure elevated to 260, asymptomatic.  20 mg IV hydralazine given which dropped patient's blood pressure down to the 70s with mild reported chest pressure with hypotension.  Repeat EKG showed sinus bradycardia axis normal intervals, PVCs, no ST elevation.  She was given IV fluid and blood pressure responded to the 150s.  Her initial troponin was 19, repeat 32.  Otherwise labs CBC is unremarkable, CMP is unremarkable, she has normal room air oxygen saturation and respiratory rate.  Heart rate is ranged from 40s to 80s while here.     Due to very wide fluctuations of blood pressure, episode of hypotension with chest discomfort and elevated troponin, patient definitely referred to hospitalist for admission.

## 2022-11-09 NOTE — DISCHARGE SUMMARY
Discharge Summary    CHIEF COMPLAINT ON ADMISSION  Chief Complaint   Patient presents with    Blood Pressure Problem       Reason for Admission  EMS     Admission Date  11/7/2022    CODE STATUS  Prior    HPI & HOSPITAL COURSE  This is a 82 y.o. female here with postprocedural hypotension  Patient went for colonoscopy today and she notes prior to that she was quite hypertensive up to the 200s, persisted following procedure.  She was given IV metoprolol and her heart rate dropped to the 30s, she was persistently hypertensive.  There were no chest pain dyspnea palpitations nausea vomiting abdominal pain headache weakness or numbness prior to or after the procedure.  No complaints on presentation to the ED.  She has been in her usual state of health prior to today's events.     Arrival to the ED patient's blood pressure elevated to 260, asymptomatic.  20 mg IV hydralazine given which dropped patient's blood pressure down to the 70s with mild reported chest pressure with hypotension.  Repeat EKG showed sinus bradycardia axis normal intervals, PVCs, no ST elevation.  She was given IV fluid and blood pressure responded to the 150s.  Her initial troponin was 19, repeat 32.  Otherwise labs CBC is unremarkable, CMP is unremarkable, she has normal room air oxygen saturation and respiratory rate.  Heart rate is ranged from 40s to 80s while here.     Due to very wide fluctuations of blood pressure, episode of hypotension with chest discomfort and elevated troponin, patient definitely referred to hospitalist for admission.    Patient was monitored on telemetry over the course of the evening had heart rate within acceptable ranges with completely asymptomatic patient also has somewhat labile blood pressure however improved since previous power on further medication review patient does take atenolol 10 mg in addition to losartan 100 mg nightly.  Patient has close follow-up with her primary care provider.  On day of discharge  patient had elevated blood pressure at approximately 180s/80 patient was given her home dose of atenolol except at 25 mg and this reduced her blood pressure to 150s over 80s.  Upon entering further interview with the patient patient did endorse she had had similar episodes involving previous procedures she has had in the past at least 2 times recently.  Patient does not keep a blood pressure log at home.  Patient told me that blood pressure devices that she has obtained consistently give her error readings.  Not sure how to interpret these error readings.  Regardless I counseled patient that on day of discharge her blood pressure was within acceptable limits her echocardiogram revealed no acute abnormalities.  I explained the patient is imperative that she keep a blood pressure log for least 7 days for which she takes her blood pressure at the same time on that day.  Patient explained to me that she would have her daughter-in-law who is a CNA come over and take her blood pressure at that specified time.  I counseled her to take this blood pressure log to her primary care provider for further review and medication adjustment if needed.    Furthermore patient asked me questions about course of action in the future with regards to procedures and managing his blood pressure I asked patient if she had anxiety regarding procedures and she endorsed that she did have anxiety regarding procedures I counseled her that she should have a discussion with her primary care provider Dr. Barroso regarding the possibility of anxiolytic, mild, prior to procedures in the future.  Patient is in agreement with this plan.  Therefore, she is discharged in good and stable condition to home with close outpatient follow-up.    The patient recovered much more quickly than anticipated on admission.    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 *      FOLLOW UP    Ally Barroso M.D.  [address] [facility]  [address]  [place] [address]  [phone]    Schedule an appointment as soon as possible for a visit       MEDICATIONS ON DISCHARGE     Medication List        CONTINUE taking these medications        Instructions   atenolol 25 MG Tabs  Commonly known as: TENORMIN   Doctor's comments: PT REQUEST MORE FILLS  TAKE 1 TABLET BY MOUTH EVERY DAY     budesonide 3 MG Cpep capsule  Commonly known as: ENTOCORT EC   Take 3 Capsules by mouth every morning for 60 days.  Dose: 9 mg     Coral Calcium 1000 (390 Ca) MG Tabs   Take 1 Each by mouth every day.  Dose: 1 Each     IMODIUM PO   Take 1 Tab by mouth as needed (For diarrhea).  Dose: 1 Tablet     losartan 100 MG Tabs  Commonly known as: COZAAR   Take 1 Tablet by mouth every day.  Dose: 100 mg     lovastatin 10 MG tablet  Commonly known as: MEVACOR   Take 1 Tablet by mouth at bedtime.  Dose: 10 mg     vitamin D3 125 MCG (5000 UT) Caps   Take 2,000 Units by mouth every day.  Dose: 2,000 Units              Allergies  Allergies   Allergen Reactions    Food Anaphylaxis and Vomiting     mussels    Sulfa Drugs Anaphylaxis    Augmentin Vomiting    Nifedipine Swelling     Leg edema       DIET  No orders of the defined types were placed in this encounter.      ACTIVITY  As tolerated.  Weight bearing as tolerated    CONSULTATIONS  None    PROCEDURES  None    LABORATORY  Lab Results   Component Value Date    SODIUM 141 11/07/2022    POTASSIUM 3.6 11/07/2022    CHLORIDE 103 11/07/2022    CO2 27 11/07/2022    GLUCOSE 87 11/07/2022    BUN 18 11/07/2022    CREATININE 1.01 11/07/2022    CREATININE 0.87 06/23/2009    GLOMRATE >59  06/23/2009        Lab Results   Component Value Date    WBC 9.1 11/07/2022    HEMOGLOBIN 14.9 11/07/2022    HEMATOCRIT 45.2 11/07/2022    PLATELETCT 256 11/07/2022      Please note that this dictation was created using voice recognition software. I have made every reasonable attempt to correct obvious errors, but I expect that there are errors of grammar and possibly context that I did not discover before finalizing the note.    Total time of the discharge process exceeds 35 minutes.

## 2022-11-10 DIAGNOSIS — I16.0 HYPERTENSIVE URGENCY: ICD-10-CM

## 2022-11-10 DIAGNOSIS — Z09 HOSPITAL DISCHARGE FOLLOW-UP: ICD-10-CM

## 2022-11-11 ENCOUNTER — HOSPITAL ENCOUNTER (OUTPATIENT)
Facility: MEDICAL CENTER | Age: 82
End: 2022-11-11
Attending: FAMILY MEDICINE
Payer: MEDICARE

## 2022-11-11 ENCOUNTER — NON-PROVIDER VISIT (OUTPATIENT)
Dept: INTERNAL MEDICINE | Facility: IMAGING CENTER | Age: 82
End: 2022-11-11
Payer: MEDICARE

## 2022-11-11 DIAGNOSIS — E11.9 TYPE 2 DIABETES MELLITUS WITHOUT COMPLICATION, WITHOUT LONG-TERM CURRENT USE OF INSULIN (HCC): ICD-10-CM

## 2022-11-11 DIAGNOSIS — N18.31 STAGE 3A CHRONIC KIDNEY DISEASE: ICD-10-CM

## 2022-11-11 DIAGNOSIS — E78.2 MIXED HYPERLIPIDEMIA WITH APOLIPOPROTEIN E2 VARIANT: Chronic | ICD-10-CM

## 2022-11-11 LAB
ALBUMIN SERPL BCP-MCNC: 3.9 G/DL (ref 3.2–4.9)
ALBUMIN/GLOB SERPL: 1.3 G/DL
ALP SERPL-CCNC: 69 U/L (ref 30–99)
ALT SERPL-CCNC: 24 U/L (ref 2–50)
ANION GAP SERPL CALC-SCNC: 9 MMOL/L (ref 7–16)
AST SERPL-CCNC: 20 U/L (ref 12–45)
BILIRUB SERPL-MCNC: 0.4 MG/DL (ref 0.1–1.5)
BUN SERPL-MCNC: 18 MG/DL (ref 8–22)
CALCIUM SERPL-MCNC: 9.1 MG/DL (ref 8.5–10.5)
CHLORIDE SERPL-SCNC: 104 MMOL/L (ref 96–112)
CHOLEST SERPL-MCNC: 146 MG/DL (ref 100–199)
CO2 SERPL-SCNC: 28 MMOL/L (ref 20–33)
CREAT SERPL-MCNC: 1 MG/DL (ref 0.5–1.4)
EST. AVERAGE GLUCOSE BLD GHB EST-MCNC: 128 MG/DL
GFR SERPLBLD CREATININE-BSD FMLA CKD-EPI: 56 ML/MIN/1.73 M 2
GLOBULIN SER CALC-MCNC: 2.9 G/DL (ref 1.9–3.5)
GLUCOSE SERPL-MCNC: 92 MG/DL (ref 65–99)
HBA1C MFR BLD: 6.1 % (ref 4–5.6)
HDLC SERPL-MCNC: 55 MG/DL
LDLC SERPL CALC-MCNC: 60 MG/DL
POTASSIUM SERPL-SCNC: 3.5 MMOL/L (ref 3.6–5.5)
PROT SERPL-MCNC: 6.8 G/DL (ref 6–8.2)
SODIUM SERPL-SCNC: 141 MMOL/L (ref 135–145)
TRIGL SERPL-MCNC: 155 MG/DL (ref 0–149)

## 2022-11-11 PROCEDURE — 80053 COMPREHEN METABOLIC PANEL: CPT

## 2022-11-11 PROCEDURE — 80061 LIPID PANEL: CPT

## 2022-11-11 PROCEDURE — 83036 HEMOGLOBIN GLYCOSYLATED A1C: CPT

## 2022-11-17 ENCOUNTER — OFFICE VISIT (OUTPATIENT)
Dept: INTERNAL MEDICINE | Facility: IMAGING CENTER | Age: 82
End: 2022-11-17
Payer: MEDICARE

## 2022-11-17 VITALS
HEART RATE: 50 BPM | WEIGHT: 174 LBS | DIASTOLIC BLOOD PRESSURE: 72 MMHG | OXYGEN SATURATION: 96 % | BODY MASS INDEX: 29.71 KG/M2 | RESPIRATION RATE: 17 BRPM | SYSTOLIC BLOOD PRESSURE: 160 MMHG | HEIGHT: 64 IN | TEMPERATURE: 99 F

## 2022-11-17 DIAGNOSIS — I10 ESSENTIAL HYPERTENSION: ICD-10-CM

## 2022-11-17 DIAGNOSIS — Z09 HOSPITAL DISCHARGE FOLLOW-UP: ICD-10-CM

## 2022-11-17 PROCEDURE — 99214 OFFICE O/P EST MOD 30 MIN: CPT | Performed by: FAMILY MEDICINE

## 2022-11-17 RX ORDER — HYDRALAZINE HYDROCHLORIDE 10 MG/1
10 TABLET, FILM COATED ORAL 3 TIMES DAILY PRN
Qty: 30 TABLET | Refills: 0 | Status: SHIPPED | OUTPATIENT
Start: 2022-11-17 | End: 2022-11-29 | Stop reason: SDUPTHER

## 2022-11-17 ASSESSMENT — FIBROSIS 4 INDEX: FIB4 SCORE: 1.32

## 2022-11-22 PROBLEM — E66.9 OBESITY (BMI 30-39.9): Status: RESOLVED | Noted: 2022-07-08 | Resolved: 2022-11-22

## 2022-11-22 PROBLEM — I95.9 HYPOTENSION: Status: RESOLVED | Noted: 2022-11-07 | Resolved: 2022-11-22

## 2022-11-22 NOTE — PROGRESS NOTES
Chief Complaint   Patient presents with    Hospital Follow-up     Blood Pressure follow up. BP has been 150s-160s/80s-100s.        HPI:  Patient is a 82 y.o. female established patient who presents today for a hospital follow up appointment. She underwent a screening colonoscopy at [facility] on 11/7/22 and was noted to have significant hypertension pre and post procedure. IV Metoprolol was administered and patient's heart rate dropped to the 30s with persistent hypertension. She was transported via [facility] to the [facility] ER for further evaluation and SBP was noted to be in high 200s. IV medications were given and she then became hypotensive with associated chest pressure. IV fluids corrected hypotension, and she was admitted for further work up. Patient did well during her stay and she remained otherwise asymptomatic. She was discharged home 11/8/22 with recommended close outpatient follow up. Patient has been stable at home and home BP averages are 150s-160s/80s-90s/ HR: 50s since being discharged.    Patient Active Problem List    Diagnosis Date Noted    Stage 3a chronic kidney disease (HCC) 07/10/2022    Type 2 diabetes mellitus without complication, without long-term current use of insulin (HCC) 07/10/2022    SHIRA (obstructive sleep apnea) 06/21/2021    Epidermal cyst 10/16/2018    Seborrheic keratosis 10/16/2018    Microscopic colitis 09/05/2017    Essential hypertension 12/01/2015    Mixed hyperlipidemia with apolipoprotein E2 variant 04/09/2015    Insulin resistance 04/09/2015    Metabolic syndrome 04/09/2015    Sinus bradycardia 03/16/2015    Eczema 10/14/2014    Osteoporosis, post-menopausal 10/14/2014    Environmental allergies 04/18/2014    Vitamin D deficiency disease 11/13/2013    Dupuytren's contracture of both hands 05/22/2013    Degenerative joint disease        Past medical, surgical, family, and social history was reviewed and updated in Epic chart by me today.     Medications and allergies reviewed and  "updated in Epic chart by me today.     ROS:  Pertinent positives listed above in HPI. All other systems have been reviewed and are negative.    PE:   BP (!) 160/72 (BP Location: Left arm, Patient Position: Sitting, BP Cuff Size: Adult)   Pulse (!) 50   Temp 37.2 °C (99 °F) (Temporal)   Resp 17   Ht 1.626 m (5' 4\")   Wt 78.9 kg (174 lb)   LMP 01/01/2002   SpO2 96%   BMI 29.87 kg/m²   Vital signs reviewed with patient.     Gen: Well developed; well nourished; no acute distress; age appropriate appearance   CV: Regular rate and rhythm; S1/ S2 present; no murmur, gallop or rub noted  Pulm: No respiratory distress; clear to ascultation b/l; no wheezing or stridor noted b/l  Extremities: No new peripheral edema b/l LE extremities/ no clubbing nor cyanosis noted  Skin: Warm and dry; no rashes noted   Neuro: No focal deficits noted   Psych: AAOx4; mood and affect are appropriate    A/P:  1. Hospital discharge follow-up  Patient was admitted to [facility] 11/7-11/8/22 for evaluation and treatment, and I have reviewed all pertinent records prior to our visit today.     2. Essential hypertension  Patient is currently experiencing elevated blood pressure readings after having significant post colonoscopy hypotension 11/7/22. Patient has resumed daily home blood pressure medication use and blood pressure cuff readings correlated at visit today by Josefina QUISPE. Recommend patient add PRN Hydralazine use, continue current daily medication use, continue home blood pressure/ heart rate monitoring qam and qhs, and follow clinical response. New RX sent to pharmacy, and I would like to see patient at office for follow up appointment in two weeks for ongoing management. She has a follow up appointment with  To 1/18/23 and is otherwise asymptomatic at this time.   - hydrALAZINE (APRESOLINE) 10 MG Tab; Take 1 Tablet by mouth 3 times a day as needed (SBP>160/ DBP/80).  Dispense: 30 Tablet; Refill: 0     Time spent: 30 minutes    "

## 2022-11-23 DIAGNOSIS — I10 ESSENTIAL HYPERTENSION: Chronic | ICD-10-CM

## 2022-11-23 RX ORDER — LOSARTAN POTASSIUM 100 MG/1
100 TABLET ORAL DAILY
Qty: 100 TABLET | Refills: 3 | Status: SHIPPED | OUTPATIENT
Start: 2022-11-23 | End: 2023-01-18

## 2022-11-29 ENCOUNTER — OFFICE VISIT (OUTPATIENT)
Dept: INTERNAL MEDICINE | Facility: IMAGING CENTER | Age: 82
End: 2022-11-29
Payer: MEDICARE

## 2022-11-29 VITALS
WEIGHT: 173.94 LBS | RESPIRATION RATE: 17 BRPM | SYSTOLIC BLOOD PRESSURE: 146 MMHG | TEMPERATURE: 98.5 F | HEIGHT: 64 IN | BODY MASS INDEX: 29.7 KG/M2 | OXYGEN SATURATION: 98 % | DIASTOLIC BLOOD PRESSURE: 78 MMHG | HEART RATE: 51 BPM

## 2022-11-29 DIAGNOSIS — I10 ESSENTIAL HYPERTENSION: ICD-10-CM

## 2022-11-29 PROCEDURE — 99213 OFFICE O/P EST LOW 20 MIN: CPT | Performed by: FAMILY MEDICINE

## 2022-11-29 RX ORDER — HYDRALAZINE HYDROCHLORIDE 10 MG/1
10 TABLET, FILM COATED ORAL 3 TIMES DAILY PRN
Qty: 30 TABLET | Refills: 3 | Status: SHIPPED | OUTPATIENT
Start: 2022-11-29 | End: 2022-12-14 | Stop reason: SDUPTHER

## 2022-11-29 ASSESSMENT — FIBROSIS 4 INDEX: FIB4 SCORE: 1.32

## 2022-12-04 NOTE — PROGRESS NOTES
"Chief Complaint   Patient presents with    Hypertension Follow-up     Bps variable.        HPI:  Patient is a 82 y.o. female established patient who presents today for a blood pressure follow up visit. She has been feeling well since our last visit and continues to take both schedule daily and PRN hydralazine for blood pressure management. Her home BP averages have been variable but office monitoring has demonstrated improvements. She has a follow up appointment with Dr. Joseph 1/18/23 for ongoing management.      Patient Active Problem List    Diagnosis Date Noted    Stage 3a chronic kidney disease (HCC) 07/10/2022    Type 2 diabetes mellitus without complication, without long-term current use of insulin (HCC) 07/10/2022    SHIRA (obstructive sleep apnea) 06/21/2021    Epidermal cyst 10/16/2018    Seborrheic keratosis 10/16/2018    Microscopic colitis 09/05/2017    Essential hypertension 12/01/2015    Mixed hyperlipidemia with apolipoprotein E2 variant 04/09/2015    Insulin resistance 04/09/2015    Metabolic syndrome 04/09/2015    Sinus bradycardia 03/16/2015    Eczema 10/14/2014    Osteoporosis, post-menopausal 10/14/2014    Environmental allergies 04/18/2014    Vitamin D deficiency disease 11/13/2013    Dupuytren's contracture of both hands 05/22/2013    Degenerative joint disease        Past medical, surgical, family, and social history was reviewed and updated in Epic chart by me today.     Medications and allergies reviewed and updated in Epic chart by me today.     ROS:  Pertinent positives listed above in HPI. All other systems have been reviewed and are negative.    PE:   BP (!) 146/78 (BP Location: Left arm, Patient Position: Sitting, BP Cuff Size: Adult)   Pulse (!) 51   Temp 36.9 °C (98.5 °F) (Temporal)   Resp 17   Ht 1.626 m (5' 4\")   Wt 78.9 kg (173 lb 15.1 oz)   LMP 01/01/2002   SpO2 98%   BMI 29.86 kg/m²   Vital signs reviewed with patient.     Gen: Well developed; well nourished; no acute " distress; age appropriate appearance   Skin: Warm and dry; no rashes noted   Neuro: No focal deficits noted   Psych: AAOx4; mood and affect are appropriate    A/P:  1. Essential hypertension  Improved as compared to recent hospitalization blood pressure readings. Recommend patient continue current scheduled daily and PRN medication use, and new RX sent to pharmacy. Patient will continue to monitor vital signs and update me accordingly.   - hydrALAZINE (APRESOLINE) 10 MG Tab; Take 1 Tablet by mouth 3 times a day as needed (SBP>160/ DBP/80).  Dispense: 30 Tablet; Refill: 3

## 2022-12-13 ENCOUNTER — NON-PROVIDER VISIT (OUTPATIENT)
Dept: INTERNAL MEDICINE | Facility: IMAGING CENTER | Age: 82
End: 2022-12-13
Payer: MEDICARE

## 2022-12-13 VITALS
TEMPERATURE: 99.3 F | OXYGEN SATURATION: 95 % | DIASTOLIC BLOOD PRESSURE: 76 MMHG | SYSTOLIC BLOOD PRESSURE: 186 MMHG | HEART RATE: 53 BPM | RESPIRATION RATE: 14 BRPM

## 2022-12-13 DIAGNOSIS — K52.838 OTHER MICROSCOPIC COLITIS: ICD-10-CM

## 2022-12-13 DIAGNOSIS — I10 ESSENTIAL HYPERTENSION: ICD-10-CM

## 2022-12-13 DIAGNOSIS — I10 ACCELERATED HYPERTENSION: ICD-10-CM

## 2022-12-13 RX ORDER — BUDESONIDE 3 MG/1
CAPSULE, COATED PELLETS ORAL
Qty: 35 CAPSULE | Refills: 0 | Status: SHIPPED | OUTPATIENT
Start: 2022-12-13 | End: 2023-01-03

## 2022-12-13 NOTE — NON-PROVIDER
Patient reports that she has 5 home BP monitors that all produce very variable results.  12/12/2022 @ home 148-202/60-80; HR 50's.  Case discussed with Dr Barroso.  Imaging & Labs ordered.  Okay to increase hydralazine to 20 mg TID prn SBP > 160.  Patient informed and agrees.

## 2022-12-14 ENCOUNTER — NON-PROVIDER VISIT (OUTPATIENT)
Dept: INTERNAL MEDICINE | Facility: IMAGING CENTER | Age: 82
End: 2022-12-14
Payer: MEDICARE

## 2022-12-14 ENCOUNTER — HOSPITAL ENCOUNTER (OUTPATIENT)
Facility: MEDICAL CENTER | Age: 82
End: 2022-12-14
Attending: FAMILY MEDICINE
Payer: MEDICARE

## 2022-12-14 VITALS
HEART RATE: 60 BPM | RESPIRATION RATE: 14 BRPM | SYSTOLIC BLOOD PRESSURE: 168 MMHG | OXYGEN SATURATION: 95 % | DIASTOLIC BLOOD PRESSURE: 70 MMHG

## 2022-12-14 DIAGNOSIS — I10 ESSENTIAL HYPERTENSION: ICD-10-CM

## 2022-12-14 DIAGNOSIS — I10 ACCELERATED HYPERTENSION: ICD-10-CM

## 2022-12-14 DIAGNOSIS — Z01.89 ENCOUNTER FOR ROUTINE LABORATORY TESTING: ICD-10-CM

## 2022-12-14 LAB
APPEARANCE UR: CLEAR
BACTERIA #/AREA URNS HPF: ABNORMAL /HPF
BILIRUB UR QL STRIP.AUTO: NEGATIVE
COLOR UR: YELLOW
EPI CELLS #/AREA URNS HPF: ABNORMAL /HPF
GLUCOSE UR STRIP.AUTO-MCNC: >=1000 MG/DL
HYALINE CASTS #/AREA URNS LPF: ABNORMAL /LPF
KETONES UR STRIP.AUTO-MCNC: NEGATIVE MG/DL
LEUKOCYTE ESTERASE UR QL STRIP.AUTO: ABNORMAL
MICRO URNS: ABNORMAL
NITRITE UR QL STRIP.AUTO: NEGATIVE
PH UR STRIP.AUTO: 6.5 [PH] (ref 5–8)
PROT UR QL STRIP: NEGATIVE MG/DL
RBC # URNS HPF: ABNORMAL /HPF
RBC UR QL AUTO: NEGATIVE
SP GR UR STRIP.AUTO: 1.02
UROBILINOGEN UR STRIP.AUTO-MCNC: 1 MG/DL
WBC #/AREA URNS HPF: ABNORMAL /HPF

## 2022-12-14 PROCEDURE — 81001 URINALYSIS AUTO W/SCOPE: CPT

## 2022-12-14 PROCEDURE — 87077 CULTURE AEROBIC IDENTIFY: CPT

## 2022-12-14 PROCEDURE — 80053 COMPREHEN METABOLIC PANEL: CPT

## 2022-12-14 PROCEDURE — 87186 SC STD MICRODIL/AGAR DIL: CPT

## 2022-12-14 PROCEDURE — 87086 URINE CULTURE/COLONY COUNT: CPT

## 2022-12-14 RX ORDER — HYDRALAZINE HYDROCHLORIDE 10 MG/1
20 TABLET, FILM COATED ORAL 3 TIMES DAILY PRN
Qty: 60 TABLET | Refills: 3 | Status: SHIPPED
Start: 2022-12-14 | End: 2023-01-18

## 2022-12-15 LAB
ALBUMIN SERPL BCP-MCNC: 3.8 G/DL (ref 3.2–4.9)
ALBUMIN/GLOB SERPL: 1.5 G/DL
ALP SERPL-CCNC: 75 U/L (ref 30–99)
ALT SERPL-CCNC: 20 U/L (ref 2–50)
ANION GAP SERPL CALC-SCNC: 12 MMOL/L (ref 7–16)
AST SERPL-CCNC: 20 U/L (ref 12–45)
BILIRUB SERPL-MCNC: 0.3 MG/DL (ref 0.1–1.5)
BUN SERPL-MCNC: 28 MG/DL (ref 8–22)
CALCIUM ALBUM COR SERPL-MCNC: 9.3 MG/DL (ref 8.5–10.5)
CALCIUM SERPL-MCNC: 9.1 MG/DL (ref 8.5–10.5)
CHLORIDE SERPL-SCNC: 103 MMOL/L (ref 96–112)
CO2 SERPL-SCNC: 22 MMOL/L (ref 20–33)
CREAT SERPL-MCNC: 1.14 MG/DL (ref 0.5–1.4)
GFR SERPLBLD CREATININE-BSD FMLA CKD-EPI: 48 ML/MIN/1.73 M 2
GLOBULIN SER CALC-MCNC: 2.6 G/DL (ref 1.9–3.5)
GLUCOSE SERPL-MCNC: 265 MG/DL (ref 65–99)
POTASSIUM SERPL-SCNC: 4.3 MMOL/L (ref 3.6–5.5)
PROT SERPL-MCNC: 6.4 G/DL (ref 6–8.2)
SODIUM SERPL-SCNC: 137 MMOL/L (ref 135–145)

## 2022-12-17 DIAGNOSIS — N39.0 RECURRENT UTI: ICD-10-CM

## 2022-12-17 RX ORDER — CIPROFLOXACIN 250 MG/1
250 TABLET, FILM COATED ORAL 2 TIMES DAILY
Qty: 10 TABLET | Refills: 0 | Status: SHIPPED | OUTPATIENT
Start: 2022-12-17 | End: 2022-12-22

## 2022-12-29 ENCOUNTER — NON-PROVIDER VISIT (OUTPATIENT)
Dept: INTERNAL MEDICINE | Facility: IMAGING CENTER | Age: 82
End: 2022-12-29
Payer: MEDICARE

## 2022-12-29 VITALS — SYSTOLIC BLOOD PRESSURE: 140 MMHG | DIASTOLIC BLOOD PRESSURE: 72 MMHG

## 2023-01-03 DIAGNOSIS — E78.2 MIXED DYSLIPIDEMIA: ICD-10-CM

## 2023-01-03 RX ORDER — LOVASTATIN 10 MG/1
10 TABLET ORAL
Qty: 100 TABLET | Refills: 2 | Status: SHIPPED
Start: 2023-01-03 | End: 2023-03-16

## 2023-01-09 ENCOUNTER — HOSPITAL ENCOUNTER (OUTPATIENT)
Dept: RADIOLOGY | Facility: MEDICAL CENTER | Age: 83
End: 2023-01-09
Attending: FAMILY MEDICINE
Payer: MEDICARE

## 2023-01-09 DIAGNOSIS — I10 ACCELERATED HYPERTENSION: ICD-10-CM

## 2023-01-09 PROCEDURE — 93975 VASCULAR STUDY: CPT

## 2023-01-13 ENCOUNTER — TELEPHONE (OUTPATIENT)
Dept: CARDIOLOGY | Facility: MEDICAL CENTER | Age: 83
End: 2023-01-13
Payer: MEDICARE

## 2023-01-13 NOTE — TELEPHONE ENCOUNTER
Spoke to patient in regards to records for NP appointment with Dr. CRAWFORD. Patient has never been treated by a cardiologist, all recent records in epic including blood work, cardiac testing, and EKG. Confirmed appt date, time and location.

## 2023-01-18 ENCOUNTER — OFFICE VISIT (OUTPATIENT)
Dept: CARDIOLOGY | Facility: MEDICAL CENTER | Age: 83
End: 2023-01-18
Attending: FAMILY MEDICINE
Payer: MEDICARE

## 2023-01-18 VITALS
HEART RATE: 59 BPM | RESPIRATION RATE: 18 BRPM | OXYGEN SATURATION: 95 % | DIASTOLIC BLOOD PRESSURE: 82 MMHG | BODY MASS INDEX: 31.76 KG/M2 | HEIGHT: 64 IN | SYSTOLIC BLOOD PRESSURE: 184 MMHG | WEIGHT: 186 LBS

## 2023-01-18 DIAGNOSIS — H02.401 PTOSIS OF RIGHT EYELID: ICD-10-CM

## 2023-01-18 DIAGNOSIS — N18.31 STAGE 3A CHRONIC KIDNEY DISEASE: ICD-10-CM

## 2023-01-18 DIAGNOSIS — I16.0 HYPERTENSIVE URGENCY: ICD-10-CM

## 2023-01-18 PROCEDURE — 99204 OFFICE O/P NEW MOD 45 MIN: CPT | Performed by: INTERNAL MEDICINE

## 2023-01-18 RX ORDER — NEBIVOLOL 10 MG/1
10 TABLET ORAL DAILY
Qty: 90 TABLET | Refills: 4 | Status: SHIPPED | OUTPATIENT
Start: 2023-01-18 | End: 2023-03-16 | Stop reason: SDUPTHER

## 2023-01-18 RX ORDER — VALSARTAN 320 MG/1
320 TABLET ORAL DAILY
Qty: 90 TABLET | Refills: 3 | Status: SHIPPED | OUTPATIENT
Start: 2023-01-18 | End: 2023-03-16 | Stop reason: SDUPTHER

## 2023-01-18 ASSESSMENT — ENCOUNTER SYMPTOMS
NAUSEA: 0
HEADACHES: 0
COUGH: 0
SENSORY CHANGE: 0
PALPITATIONS: 0
WEIGHT LOSS: 0
BRUISES/BLEEDS EASILY: 0
DEPRESSION: 0
BLURRED VISION: 0
ORTHOPNEA: 0
HALLUCINATIONS: 0
EYE PAIN: 0
FEVER: 0
ABDOMINAL PAIN: 0
CLAUDICATION: 0
DIZZINESS: 0
DOUBLE VISION: 0
VOMITING: 0
BLOOD IN STOOL: 0
SPEECH CHANGE: 0
MYALGIAS: 0
CHILLS: 0
EYE DISCHARGE: 0
SHORTNESS OF BREATH: 0
LOSS OF CONSCIOUSNESS: 0
FALLS: 0
PND: 0

## 2023-01-18 ASSESSMENT — FIBROSIS 4 INDEX: FIB4 SCORE: 1.45

## 2023-01-18 NOTE — PROGRESS NOTES
Chief Complaint   Patient presents with    Hypertension     NP Dx: Hypertensive urgency     Hyperlipidemia     F/V Dx: Mixed hyperlipidemia with apolipoprotein E2 variant         Subjective     Kacie Rubalcava is a 82 y.o. female who presents today for uncontrolled HTN. Been happening more erratic after her colonoscopy.    Kacie Rubalcava does not have any history of heart attack arrhythmias in the past. she never had transthoracic echocardiogram, cardiac catheterization or ablations procedure in the past. At this time, she denies having chest pain shortness of breath presyncopal syncopal episodes. she is able to exercise with walking for one to 2 miles without having problems of chest pain or shortness of breath. Patient is also able to climb up at least 2 flights of stairs without having symptoms.    I have independently interpreted and reviewed echocardiogram's actual images with patient which showed normal left ventricular systolic function. No wall motion abnormality. No evidence of pulmonary hypertension. No significant valvular disease.    I have personally interpreted EKG today with patient, there is no evidence of acute coronary syndrome, no evidence of prior infarct, normal ID and QT interval, no significant conduction disease. Sinus bradycardia.    I have independently interpreted and reviewed blood tests results with patient in clinic which shows normal LDL level 60, triglycerides 155, GFR of 48, K of 4.3.        Past Medical History:   Diagnosis Date    Anxiety     Arthritis 03/12/15    generalized    Chickenpox     Degenerative joint disease     Epidermal cyst 10/16/2018    [place] measles     High cholesterol     HTN (hypertension), benign     Hyperlipemia     Hypertension     Microscopic colitis 2010    Neck pain on left side 5/31/2017    Osteoporosis     Other specified symptom associated with female genital organs     post menopausal bleeding    Seborrheic keratosis 10/16/2018    Snoring       Past Surgical History:   Procedure Laterality Date    HYSTEROSCOPY WITH VIDEO DIAGNOSTIC  3/13/2015    [facility]    DILATION AND CURETTAGE  3/13/2015    [facility]    BREAST BIOPSY  11/13/08    Performed by ISRAEL HICKS at [facility]    COLON RESECTION  2000    polyps    APPENDECTOMY      OTHER NEUROLOGICAL SURG      TONSILLECTOMY      US-NEEDLE CORE BX-BREAST PANEL       Family History   Problem Relation Age of Onset    Lung Disease Mother     Cancer Mother         lung cancer    Cancer Paternal Aunt         breast     Social History     Socioeconomic History    Marital status: Single     Spouse name: Not on file    Number of children: Not on file    Years of education: Not on file    Highest education level: Not on file   Occupational History    Not on file   Tobacco Use    Smoking status: Never    Smokeless tobacco: Never   Vaping Use    Vaping Use: Never used   Substance and Sexual Activity    Alcohol use: Yes     Comment: Occ    Drug use: No    Sexual activity: Yes     Partners: Male   Other Topics Concern    Not on file   Social History Narrative    Not on file     Social Determinants of Health     Financial Resource Strain: Not on file   Food Insecurity: Not on file   Transportation Needs: Not on file   Physical Activity: Not on file   Stress: Not on file   Social Connections: Not on file   Intimate Partner Violence: Not on file   Housing Stability: Not on file     Allergies   Allergen Reactions    Food Anaphylaxis and Vomiting     mussels    Sulfa Drugs Anaphylaxis    Augmentin Vomiting    Nifedipine Swelling     Leg edema     Outpatient Encounter Medications as of 1/18/2023   Medication Sig Dispense Refill    nebivolol (BYSTOLIC) 10 MG Tab Take 1 Tablet by mouth every day. 90 Tablet 4    lovastatin (MEVACOR) 10 MG tablet Take 1 Tablet by mouth at bedtime. 100  "Tablet 2    losartan (COZAAR) 100 MG Tab Take 1 Tablet by mouth every day. 100 Tablet 3    Cholecalciferol (VITAMIN D3) 5000 UNITS CAPS Take 2,000 Units by mouth every day.      Coral Calcium 1000 (390 CA) MG TABS Take 1 Each by mouth every day. 60 Each     [DISCONTINUED] hydrALAZINE (APRESOLINE) 10 MG Tab Take 2 Tablets by mouth 3 times a day as needed (SBP>160/ DBP/80). 60 Tablet 3    [DISCONTINUED] atenolol (TENORMIN) 25 MG Tab TAKE 1 TABLET BY MOUTH EVERY DAY 90 Tablet 3    Loperamide HCl (IMODIUM PO) Take 1 Tab by mouth as needed (For diarrhea). (Patient not taking: Reported on 1/18/2023)       No facility-administered encounter medications on file as of 1/18/2023.     Review of Systems   Constitutional:  Negative for chills, fever, malaise/fatigue and weight loss.   HENT:  Negative for ear discharge, ear pain, hearing loss and nosebleeds.    Eyes:  Negative for blurred vision, double vision, pain and discharge.   Respiratory:  Negative for cough and shortness of breath.    Cardiovascular:  Negative for chest pain, palpitations, orthopnea, claudication, leg swelling and PND.   Gastrointestinal:  Negative for abdominal pain, blood in stool, melena, nausea and vomiting.   Genitourinary:  Negative for dysuria and hematuria.   Musculoskeletal:  Negative for falls, joint pain and myalgias.   Skin:  Negative for itching and rash.   Neurological:  Negative for dizziness, sensory change, speech change, loss of consciousness and headaches.   Endo/Heme/Allergies:  Negative for environmental allergies. Does not bruise/bleed easily.   Psychiatric/Behavioral:  Negative for depression, hallucinations and suicidal ideas.             Objective     BP (!) 184/82 (BP Location: Left arm, Patient Position: Sitting, BP Cuff Size: Adult)   Pulse (!) 59   Resp 18   Ht 1.626 m (5' 4\")   Wt 84.4 kg (186 lb)   LMP 01/01/2002   SpO2 95%   BMI 31.93 kg/m²     Physical Exam  Vitals and nursing note reviewed.   Constitutional:       " General: She is not in acute distress.     Appearance: She is not diaphoretic.   HENT:      Head: Normocephalic and atraumatic.      Right Ear: External ear normal.      Left Ear: External ear normal.      Nose: No congestion or rhinorrhea.   Eyes:      General:         Right eye: No discharge.         Left eye: No discharge.   Neck:      Thyroid: No thyromegaly.      Vascular: No JVD.   Cardiovascular:      Rate and Rhythm: Normal rate and regular rhythm.      Pulses: Normal pulses.   Pulmonary:      Effort: No respiratory distress.   Abdominal:      General: There is no distension.      Tenderness: There is no abdominal tenderness.   Musculoskeletal:         General: No swelling or tenderness.      Right lower leg: No edema.      Left lower leg: No edema.   Skin:     General: Skin is warm and dry.   Neurological:      Mental Status: She is alert and oriented to person, place, and time.      Cranial Nerves: No cranial nerve deficit.   Psychiatric:         Behavior: Behavior normal.              Assessment & Plan     1. Hypertensive urgency  nebivolol (BYSTOLIC) 10 MG Tab      2. Stage 3a chronic kidney disease (HCC)            Medical Decision Making: Today's Assessment/Status/Plan:   Blood pressure is extremely elevated today. No TIA or stroke at this time. Patient is asymptomatic, thus, no need for ER visit nor hospitalization. I suspect there is some desensitization to her current HTN medications as she has been taking them for many years.    Will stop Atenolol.    Will start Bystolic 10 mg daily.    Will stop Hydralazine PRN.    Will stop Losartan 100 mg daily.    Will start Valsartan 320 mg daily.    Will continue to monitor CKD, avoid dehydration.

## 2023-01-18 NOTE — PATIENT INSTRUCTIONS
Will stop Atenolol.    Will start Bystolic 10 mg daily.    Will stop Hydralazine PRN.    Will stop Losartan 100 mg daily.    Will start Valsartan 320 mg daily.

## 2023-02-07 ENCOUNTER — OFFICE VISIT (OUTPATIENT)
Dept: INTERNAL MEDICINE | Facility: IMAGING CENTER | Age: 83
End: 2023-02-07
Payer: MEDICARE

## 2023-02-07 VITALS
RESPIRATION RATE: 17 BRPM | DIASTOLIC BLOOD PRESSURE: 80 MMHG | HEART RATE: 54 BPM | SYSTOLIC BLOOD PRESSURE: 142 MMHG | OXYGEN SATURATION: 95 % | BODY MASS INDEX: 31.77 KG/M2 | HEIGHT: 64 IN | TEMPERATURE: 99.7 F | WEIGHT: 186.07 LBS

## 2023-02-07 DIAGNOSIS — R09.82 POST-NASAL DRIP: ICD-10-CM

## 2023-02-07 DIAGNOSIS — M48.061 LUMBAR FORAMINAL STENOSIS: ICD-10-CM

## 2023-02-07 DIAGNOSIS — I10 ESSENTIAL HYPERTENSION: Chronic | ICD-10-CM

## 2023-02-07 DIAGNOSIS — R73.09 ELEVATED HEMOGLOBIN A1C: ICD-10-CM

## 2023-02-07 DIAGNOSIS — M54.50 CHRONIC BILATERAL LOW BACK PAIN WITHOUT SCIATICA: ICD-10-CM

## 2023-02-07 DIAGNOSIS — K52.838 OTHER MICROSCOPIC COLITIS: ICD-10-CM

## 2023-02-07 DIAGNOSIS — N32.81 OAB (OVERACTIVE BLADDER): ICD-10-CM

## 2023-02-07 DIAGNOSIS — G89.29 CHRONIC BILATERAL LOW BACK PAIN WITHOUT SCIATICA: ICD-10-CM

## 2023-02-07 PROBLEM — M54.9 CHRONIC BACK PAIN: Status: ACTIVE | Noted: 2023-02-07

## 2023-02-07 PROBLEM — E11.9 TYPE 2 DIABETES MELLITUS WITHOUT COMPLICATION, WITHOUT LONG-TERM CURRENT USE OF INSULIN (HCC): Status: RESOLVED | Noted: 2022-07-10 | Resolved: 2023-02-07

## 2023-02-07 PROCEDURE — 99214 OFFICE O/P EST MOD 30 MIN: CPT | Performed by: FAMILY MEDICINE

## 2023-02-07 RX ORDER — DIPHENOXYLATE HYDROCHLORIDE AND ATROPINE SULFATE 2.5; .025 MG/1; MG/1
1 TABLET ORAL 4 TIMES DAILY PRN
Qty: 28 TABLET | Refills: 3 | Status: SHIPPED | OUTPATIENT
Start: 2023-02-07 | End: 2023-02-14

## 2023-02-07 ASSESSMENT — FIBROSIS 4 INDEX: FIB4 SCORE: 1.45

## 2023-02-07 ASSESSMENT — PATIENT HEALTH QUESTIONNAIRE - PHQ9: CLINICAL INTERPRETATION OF PHQ2 SCORE: 0

## 2023-02-08 NOTE — PROGRESS NOTES
Chief Complaint   Patient presents with    Follow-Up     From cardiology visit.        HPI:  Patient is a 82 y.o. female established patient who presents today to discuss her current health concerns.   She has chronic, worsening OAB with prior medication use, and she would like a referral to urology for specialty evaluation. She suffers from chronic post nasal drip and congestion refractory to prior medication use and is seeking additional treatment options. She also reports worsening low back pain/stiffness, especially noticeable when standing while cooking in the kitchen and with starting to walk. She also endorses inability to climb staircases unless absolutely required and cannot walk for long periods of time due to pain. She has chronic microscopic colitis history and uses Lomotil PRN for diarrhea management. She continues to benefit from medication use and is requesting new prescription to be sent to [facility] pharmacy. She also has chronic essential HTN and recently has had very elevated blood pressure readings post screening colonoscopy at [facility] 11/7/22. Patient was subsequently hospitalized for blood pressure management and established with  To 1/18/23. He made medication changes at that visit, and patient has a follow up appointment with him 3/16/23. She also has questions about CKD and pre diabetes diagnoses, and she endorses 100% medication compliance.     Patient Active Problem List    Diagnosis Date Noted    OAB (overactive bladder) 02/07/2023    Post-nasal drip 02/07/2023    Lumbar foraminal stenosis 02/07/2023    Chronic back pain 02/07/2023    Elevated hemoglobin A1c 02/07/2023    Stage 3a chronic kidney disease (HCC) 07/10/2022    SHIRA (obstructive sleep apnea) 06/21/2021    Epidermal cyst 10/16/2018    Seborrheic keratosis 10/16/2018    Microscopic colitis 09/05/2017    Essential hypertension 12/01/2015    Mixed hyperlipidemia with apolipoprotein E2 variant 04/09/2015    Insulin resistance 04/09/2015  "   Metabolic syndrome 04/09/2015    Sinus bradycardia 03/16/2015    Eczema 10/14/2014    Osteoporosis, post-menopausal 10/14/2014    Environmental allergies 04/18/2014    Vitamin D deficiency disease 11/13/2013    Dupuytren's contracture of both hands 05/22/2013    Degenerative joint disease        Past medical, surgical, family, and social history was reviewed and updated in Epic chart by me today.     Medications and allergies reviewed and updated in Epic chart by me today.     ROS:  Pertinent positives listed above in HPI. All other systems have been reviewed and are negative.    PE:   BP (!) 142/80 (BP Location: Left arm, Patient Position: Sitting, BP Cuff Size: Small adult)   Pulse (!) 54   Temp 37.6 °C (99.7 °F) (Temporal)   Resp 17   Ht 1.626 m (5' 4\")   Wt 84.4 kg (186 lb 1.1 oz)   LMP 01/01/2002   SpO2 95%   BMI 31.94 kg/m²   Vital signs reviewed with patient.     Gen: Well developed; well nourished; no acute distress; age appropriate appearance   CV: Regular rate and rhythm; S1/ S2 present; no murmur, gallop or rub noted  Pulm: No respiratory distress; clear to ascultation b/l; no wheezing or stridor noted b/l  Extremities: No new peripheral edema b/l LE extremities/ no clubbing nor cyanosis noted  Skin: Warm and dry; no rashes noted   Neuro: No focal deficits noted   Psych: AAOx4; mood and affect are appropriate    A/P:  1. Other microscopic colitis  Stable at this time/ patient can continue PRN Lomotil use and  reviewed today and no concerns noted. Pt is well versed in safe use of controlled medication, and benefit of use outweighs risk at this time. New RX sent to pharmacy.   - diphenoxylate-atropine (LOMOTIL) 2.5-0.025 MG Tab; Take 1 Tablet by mouth 4 times a day as needed for Diarrhea for up to 7 days.  Dispense: 28 Tablet; Refill: 3    2. OAB (overactive bladder)  Chronic, ongoing condition for patient as described above in HPI. She has tried appropriate medication use without success in " the past and is seeking alternative management options. Referral made to Dr. Prajapati in [place] for evaluation and treatment (patient does not want referral to [facility] [place]).   - Referral to Urology    3. Post-nasal drip  Chronic, ongoing condition for patient refractory to medication use. Patient is seeking further management options, and referral made to [facility] for specialty evaluation and treatment.   - Referral to ENT    4. Lumbar foraminal stenosis/ Chronic bilateral low back pain without sciatica  Worsening condition for patient as described above in HPI. Patient has known spinal stenosis on prior imaging, and I suspect current pain/stiffness complaints are directly related. Patient has seen Dr. Vallejo in the past, and new referral made to him for specialty evaluation and treatment.   - Referral to Neurosurgery    5. Elevated hemoglobin A1c  HgA1c 11/11/22 6.1% and recommend patient repeat HgA1c in March for ongoing management.     6. Essential hypertension  Chronic condition managed by Dr. Joseph (refer to HPI for details). Patient endorses medication compliance and denies new side effects from recent medication changes.

## 2023-02-12 RX ORDER — ZOLEDRONIC ACID 5 MG/100ML
5 INJECTION, SOLUTION INTRAVENOUS ONCE
OUTPATIENT
Start: 2023-02-12 | End: 2023-02-12

## 2023-02-27 DIAGNOSIS — M48.061 LUMBAR FORAMINAL STENOSIS: ICD-10-CM

## 2023-02-27 DIAGNOSIS — G89.29 CHRONIC BILATERAL LOW BACK PAIN WITHOUT SCIATICA: ICD-10-CM

## 2023-02-27 DIAGNOSIS — M54.50 CHRONIC BILATERAL LOW BACK PAIN WITHOUT SCIATICA: ICD-10-CM

## 2023-03-09 DIAGNOSIS — M79.671 RIGHT FOOT PAIN: ICD-10-CM

## 2023-03-16 ENCOUNTER — OFFICE VISIT (OUTPATIENT)
Dept: CARDIOLOGY | Facility: MEDICAL CENTER | Age: 83
End: 2023-03-16
Payer: MEDICARE

## 2023-03-16 VITALS
HEART RATE: 55 BPM | BODY MASS INDEX: 31.69 KG/M2 | WEIGHT: 185.6 LBS | OXYGEN SATURATION: 94 % | SYSTOLIC BLOOD PRESSURE: 128 MMHG | DIASTOLIC BLOOD PRESSURE: 80 MMHG | HEIGHT: 64 IN | RESPIRATION RATE: 16 BRPM

## 2023-03-16 DIAGNOSIS — N18.31 STAGE 3A CHRONIC KIDNEY DISEASE: ICD-10-CM

## 2023-03-16 DIAGNOSIS — E78.5 DYSLIPIDEMIA: ICD-10-CM

## 2023-03-16 DIAGNOSIS — I16.0 HYPERTENSIVE URGENCY: ICD-10-CM

## 2023-03-16 PROCEDURE — 99214 OFFICE O/P EST MOD 30 MIN: CPT | Performed by: INTERNAL MEDICINE

## 2023-03-16 RX ORDER — NEBIVOLOL 10 MG/1
10 TABLET ORAL DAILY
Qty: 90 TABLET | Refills: 4 | Status: SHIPPED
Start: 2023-03-16 | End: 2024-03-04

## 2023-03-16 RX ORDER — VALSARTAN 320 MG/1
320 TABLET ORAL DAILY
Qty: 90 TABLET | Refills: 4 | Status: SHIPPED | OUTPATIENT
Start: 2023-03-16 | End: 2024-03-04 | Stop reason: SDUPTHER

## 2023-03-16 RX ORDER — ROSUVASTATIN CALCIUM 10 MG/1
10 TABLET, COATED ORAL EVERY EVENING
Qty: 100 TABLET | Refills: 4 | Status: SHIPPED | OUTPATIENT
Start: 2023-03-16 | End: 2024-03-04 | Stop reason: SDUPTHER

## 2023-03-16 ASSESSMENT — ENCOUNTER SYMPTOMS
DIZZINESS: 0
BRUISES/BLEEDS EASILY: 0
COUGH: 0
EYE PAIN: 0
CHILLS: 0
LOSS OF CONSCIOUSNESS: 0
HEADACHES: 0
DEPRESSION: 0
PND: 0
NAUSEA: 0
BLOOD IN STOOL: 0
VOMITING: 0
WEIGHT LOSS: 0
ORTHOPNEA: 0
SHORTNESS OF BREATH: 0
FEVER: 0
EYE DISCHARGE: 0
ABDOMINAL PAIN: 0
PALPITATIONS: 0
SPEECH CHANGE: 0
HALLUCINATIONS: 0
DOUBLE VISION: 0
CLAUDICATION: 0
BLURRED VISION: 0
SENSORY CHANGE: 0
MYALGIAS: 0
FALLS: 0

## 2023-03-16 ASSESSMENT — FIBROSIS 4 INDEX: FIB4 SCORE: 1.45

## 2023-03-16 NOTE — PATIENT INSTRUCTIONS
Patient is not in acute coronary syndrome presentation, is not having any active TIA or stroke symptoms, as not having decompensated heart failure, is not symptomatic in terms of presyncope pain or syncope. No evidence of significant valvular disease.    I explained to patient that further cardiac testing or procedures will not lower her overall cardiovascular risk for the surgery.  Patient remains low to moderate cardiovascular risk for her intended surgery of eyelids and dental works.    Cardiologist,  Ita Joseph M.D.

## 2023-03-16 NOTE — PROGRESS NOTES
Chief Complaint   Patient presents with    Bradycardia     F/V DX: Sinus bradycardia       Subjective     Kacie Rubalcava is a 82 y.o. female who presents today for uncontrolled HTN. Been happening more erratic after her colonoscopy.    Kacie Rubalcava does not have any history of heart attack arrhythmias in the past. she never had transthoracic echocardiogram, cardiac catheterization or ablations procedure in the past. At this time, she denies having chest pain shortness of breath presyncopal syncopal episodes. she is able to exercise with walking for one to 2 miles without having problems of chest pain or shortness of breath. Patient is also able to climb up at least 2 flights of stairs without having symptoms.    I have independently interpreted and reviewed echocardiogram's actual images with patient which showed normal left ventricular systolic function. No wall motion abnormality. No evidence of pulmonary hypertension. No significant valvular disease.    I have personally interpreted EKG today with patient, there is no evidence of acute coronary syndrome, no evidence of prior infarct, normal MT and QT interval, no significant conduction disease. Sinus bradycardia.    I have independently interpreted and reviewed blood tests results with patient in clinic which shows normal LDL level 60, triglycerides 155, GFR of 48, K of 4.3.        Past Medical History:   Diagnosis Date    Anxiety     Arthritis 03/12/15    generalized    Chickenpox     Degenerative joint disease     Epidermal cyst 10/16/2018    [place] measles     High cholesterol     HTN (hypertension), benign     Hyperlipemia     Hypertension     Microscopic colitis 2010    Neck pain on left side 5/31/2017    Osteoporosis     Other specified symptom associated with female genital organs     post menopausal bleeding    Seborrheic keratosis 10/16/2018    Snoring      Past Surgical History:   Procedure Laterality Date    HYSTEROSCOPY WITH VIDEO  DIAGNOSTIC  3/13/2015    Performed by Alexandria Keenan M.D. at SURGERY SAME DAY [facility] ORS    DILATION AND CURETTAGE  3/13/2015    Performed by Alexandria Keenan M.D. at SURGERY SAME DAY [facility] ORS    BREAST BIOPSY  11/13/08    Performed by ISRAEL HICKS at SURGERY SAME DAY [facility] ORS X3    COLON RESECTION  2000    polyps    APPENDECTOMY      OTHER NEUROLOGICAL SURG      TONSILLECTOMY      US-NEEDLE CORE BX-BREAST PANEL       Family History   Problem Relation Age of Onset    Lung Disease Mother     Cancer Mother         lung cancer    Cancer Paternal Aunt         breast     Social History     Socioeconomic History    Marital status: Single     Spouse name: Not on file    Number of children: Not on file    Years of education: Not on file    Highest education level: Not on file   Occupational History    Not on file   Tobacco Use    Smoking status: Never    Smokeless tobacco: Never   Vaping Use    Vaping Use: Never used   Substance and Sexual Activity    Alcohol use: Yes     Comment: Occ    Drug use: No    Sexual activity: Yes     Partners: Male   Other Topics Concern    Not on file   Social History Narrative    Not on file     Social Determinants of Health     Financial Resource Strain: Not on file   Food Insecurity: Not on file   Transportation Needs: Not on file   Physical Activity: Not on file   Stress: Not on file   Social Connections: Not on file   Intimate Partner Violence: Not on file   Housing Stability: Not on file     Allergies   Allergen Reactions    Food Anaphylaxis and Vomiting     mussels    Sulfa Drugs Anaphylaxis    Augmentin Vomiting    Nifedipine Swelling     Leg edema     Outpatient Encounter Medications as of 3/16/2023   Medication Sig Dispense Refill    valsartan (DIOVAN) 320 MG tablet Take 1 Tablet by mouth every day. 90 Tablet 4    nebivolol (BYSTOLIC) 10 MG Tab Take 1 Tablet by mouth every day. 90 Tablet 4    rosuvastatin (CRESTOR) 10 MG Tab Take 1 Tablet by mouth every evening. 100  "Tablet 4    Cholecalciferol (VITAMIN D3) 5000 UNITS CAPS Take 2,000 Units by mouth every day.      Coral Calcium 1000 (390 CA) MG TABS Take 1 Each by mouth every day. 60 Each     [DISCONTINUED] nebivolol (BYSTOLIC) 10 MG Tab Take 1 Tablet by mouth every day. 90 Tablet 4    [DISCONTINUED] valsartan (DIOVAN) 320 MG tablet Take 1 Tablet by mouth every day. 90 Tablet 3    [DISCONTINUED] lovastatin (MEVACOR) 10 MG tablet Take 1 Tablet by mouth at bedtime. 100 Tablet 2     No facility-administered encounter medications on file as of 3/16/2023.     Review of Systems   Constitutional:  Negative for chills, fever, malaise/fatigue and weight loss.   HENT:  Negative for ear discharge, ear pain, hearing loss and nosebleeds.    Eyes:  Negative for blurred vision, double vision, pain and discharge.   Respiratory:  Negative for cough and shortness of breath.    Cardiovascular:  Negative for chest pain, palpitations, orthopnea, claudication, leg swelling and PND.   Gastrointestinal:  Negative for abdominal pain, blood in stool, melena, nausea and vomiting.   Genitourinary:  Negative for dysuria and hematuria.   Musculoskeletal:  Negative for falls, joint pain and myalgias.   Skin:  Negative for itching and rash.   Neurological:  Negative for dizziness, sensory change, speech change, loss of consciousness and headaches.   Endo/Heme/Allergies:  Negative for environmental allergies. Does not bruise/bleed easily.   Psychiatric/Behavioral:  Negative for depression, hallucinations and suicidal ideas.             Objective     /80 (BP Location: Left arm, Patient Position: Sitting, BP Cuff Size: Adult)   Pulse (!) 55   Resp 16   Ht 1.626 m (5' 4\")   Wt 84.2 kg (185 lb 9.6 oz)   LMP 01/01/2002   SpO2 94%   BMI 31.86 kg/m²     Physical Exam  Vitals and nursing note reviewed.   Constitutional:       General: She is not in acute distress.     Appearance: She is not diaphoretic.   HENT:      Head: Normocephalic and atraumatic.      " Right Ear: External ear normal.      Left Ear: External ear normal.      Nose: No congestion or rhinorrhea.   Eyes:      General:         Right eye: No discharge.         Left eye: No discharge.   Neck:      Thyroid: No thyromegaly.      Vascular: No JVD.   Cardiovascular:      Rate and Rhythm: Normal rate and regular rhythm.      Pulses: Normal pulses.   Pulmonary:      Effort: No respiratory distress.   Abdominal:      General: There is no distension.      Tenderness: There is no abdominal tenderness.   Musculoskeletal:         General: No swelling or tenderness.      Right lower leg: No edema.      Left lower leg: No edema.   Skin:     General: Skin is warm and dry.   Neurological:      Mental Status: She is alert and oriented to person, place, and time.      Cranial Nerves: No cranial nerve deficit.   Psychiatric:         Behavior: Behavior normal.              Assessment & Plan     1. Hypertensive urgency  valsartan (DIOVAN) 320 MG tablet    nebivolol (BYSTOLIC) 10 MG Tab    Basic Metabolic Panel    LIPID PANEL      2. Stage 3a chronic kidney disease (HCC)  Basic Metabolic Panel    LIPID PANEL      3. Dyslipidemia            Medical Decision Making: Today's Assessment/Status/Plan:   Blood pressure is extremely elevated today. No TIA or stroke at this time. Patient is asymptomatic, thus, no need for ER visit nor hospitalization. I suspect there is some desensitization to her current HTN medications as she has been taking them for many years.    Will continue Bystolic 10 mg daily, Valsartan 320 mg daily.    Will change Lovastatin to Rosuvastain 10 mg daily.     Patient is not in acute coronary syndrome presentation, is not having any active TIA or stroke symptoms, as not having decompensated heart failure, is not symptomatic in terms of presyncope pain or syncope. No evidence of significant valvular disease.    I explained to patient that further cardiac testing or procedures will not lower her overall  cardiovascular risk for the surgery.  Patient remains low to moderate cardiovascular risk for her intended surgery of eyelids and dental works.      Ita Joseph M.D.

## 2023-06-21 ENCOUNTER — OFFICE VISIT (OUTPATIENT)
Dept: INTERNAL MEDICINE | Facility: IMAGING CENTER | Age: 83
End: 2023-06-21
Payer: MEDICARE

## 2023-06-21 VITALS
HEART RATE: 53 BPM | SYSTOLIC BLOOD PRESSURE: 132 MMHG | BODY MASS INDEX: 31.69 KG/M2 | WEIGHT: 185.63 LBS | OXYGEN SATURATION: 100 % | TEMPERATURE: 97.9 F | DIASTOLIC BLOOD PRESSURE: 62 MMHG | RESPIRATION RATE: 17 BRPM | HEIGHT: 64 IN

## 2023-06-21 DIAGNOSIS — N18.31 STAGE 3A CHRONIC KIDNEY DISEASE: ICD-10-CM

## 2023-06-21 DIAGNOSIS — E55.9 VITAMIN D DEFICIENCY: ICD-10-CM

## 2023-06-21 DIAGNOSIS — Z00.00 HEALTH CARE MAINTENANCE: ICD-10-CM

## 2023-06-21 DIAGNOSIS — G89.29 CHRONIC PAIN OF LEFT KNEE: ICD-10-CM

## 2023-06-21 DIAGNOSIS — E11.9 TYPE 2 DIABETES MELLITUS WITHOUT COMPLICATION, WITHOUT LONG-TERM CURRENT USE OF INSULIN (HCC): ICD-10-CM

## 2023-06-21 DIAGNOSIS — K52.838 OTHER MICROSCOPIC COLITIS: ICD-10-CM

## 2023-06-21 DIAGNOSIS — D75.89 MACROCYTOSIS: ICD-10-CM

## 2023-06-21 DIAGNOSIS — I10 ESSENTIAL HYPERTENSION: ICD-10-CM

## 2023-06-21 DIAGNOSIS — E78.2 MIXED DYSLIPIDEMIA: ICD-10-CM

## 2023-06-21 DIAGNOSIS — R53.83 OTHER FATIGUE: ICD-10-CM

## 2023-06-21 DIAGNOSIS — M25.562 CHRONIC PAIN OF LEFT KNEE: ICD-10-CM

## 2023-06-21 PROCEDURE — 3078F DIAST BP <80 MM HG: CPT | Performed by: FAMILY MEDICINE

## 2023-06-21 PROCEDURE — 99213 OFFICE O/P EST LOW 20 MIN: CPT | Performed by: FAMILY MEDICINE

## 2023-06-21 PROCEDURE — 3075F SYST BP GE 130 - 139MM HG: CPT | Performed by: FAMILY MEDICINE

## 2023-06-21 ASSESSMENT — FIBROSIS 4 INDEX: FIB4 SCORE: 1.45

## 2023-06-21 NOTE — PROGRESS NOTES
Chief Complaint   Patient presents with    Knee Pain     Left knee pain. She saw ortho about a year ago - borderline bone on bone and arthritis. She went to PT which was very helpful. About a month ago the pain has changed, it now wraps around the whole knee and she has difficulty standing up. She reports the pressure is what bothers her the most.        HPI:  Patient is a 82 y.o. female established patient who presents today to discuss current health concerns. She suffers from chronic left knee osteoarthritis and completed PT sessions in Fall 2022 with some pain relief. Approximately one month ago, she started experiencing left anterior/lateral knee joint pain (wraps around entire knee area) and swelling, most prominent when standing up from seated position. She denies known trigger for new symptoms and has been taking 1 Advil at a time PRN.    Patient Active Problem List    Diagnosis Date Noted    Chronic pain of left knee 06/21/2023    OAB (overactive bladder) 02/07/2023    Post-nasal drip 02/07/2023    Lumbar foraminal stenosis 02/07/2023    Chronic back pain 02/07/2023    Elevated hemoglobin A1c 02/07/2023    Stage 3a chronic kidney disease (HCC) 07/10/2022    SHIRA (obstructive sleep apnea) 06/21/2021    Epidermal cyst 10/16/2018    Seborrheic keratosis 10/16/2018    Microscopic colitis 09/05/2017    Essential hypertension 12/01/2015    Mixed hyperlipidemia with apolipoprotein E2 variant 04/09/2015    Insulin resistance 04/09/2015    Metabolic syndrome 04/09/2015    Sinus bradycardia 03/16/2015    Eczema 10/14/2014    Osteoporosis, post-menopausal 10/14/2014    Environmental allergies 04/18/2014    Vitamin D deficiency disease 11/13/2013    Dupuytren's contracture of both hands 05/22/2013    Degenerative joint disease        Past medical, surgical, family, and social history was reviewed and updated in Epic chart by me today.     Medications and allergies reviewed and updated in Epic chart by me today.  "    ROS:  Pertinent positives listed above in HPI. All other systems have been reviewed and are negative.    PE:   /62 (BP Location: Left arm, Patient Position: Sitting, BP Cuff Size: Adult)   Pulse (!) 53   Temp 36.6 °C (97.9 °F) (Temporal)   Resp 17   Ht 1.626 m (5' 4\")   Wt 84.2 kg (185 lb 10 oz)   LMP 01/01/2002   SpO2 100%   BMI 31.86 kg/m²   Vital signs reviewed with patient.     Gen: Well developed; well nourished; no acute distress; age appropriate appearance   CV: Regular rate and rhythm; S1/ S2 present; no murmur, gallop or rub noted  Pulm: No respiratory distress; clear to ascultation b/l; no wheezing or stridor noted b/l  Lower extremities: increased anterior lateral left knee joint edema; no new peripheral edema b/l LE extremities/ no clubbing nor cyanosis noted  Skin: Warm and dry; no rashes noted   Neuro: No new focal deficits noted   Psych: AAOx4; mood and affect are appropriate    A/P:  1. Chronic pain of left knee  Chronic condition for patient as described above in HPI. Patient completed PT last fall with some improvement but pain distribution has changed over the past one month. She is now experiencing anterior/lateral knee joint pain that is most prominent when standing up. She denies recent trauma history and has seen Dr. Arshad at [facility] in the past for evaluation. She is interested in obtaining a second opinion, and new referral made to [facility] [place] for evaluation and treatment options. Recommend patient ice knee joint at end of each day and can continue current PRN Advil use as well.   - Referral to Orthopedics    2. Health care maintenance  Patient is aware of need to make appointments for fasting labs and Medicare Wellness visit next month with me. Eye exam is UTD at [facility] in [place], and Josefina QUISPE will request report for our records. She is aware of vaccine eligibility and will discuss further during annual visit.     "

## 2023-07-18 ENCOUNTER — OFFICE VISIT (OUTPATIENT)
Dept: INTERNAL MEDICINE | Facility: IMAGING CENTER | Age: 83
End: 2023-07-18
Payer: MEDICARE

## 2023-07-18 VITALS
RESPIRATION RATE: 14 BRPM | OXYGEN SATURATION: 94 % | HEIGHT: 64 IN | HEART RATE: 57 BPM | BODY MASS INDEX: 31.85 KG/M2 | TEMPERATURE: 98.2 F | DIASTOLIC BLOOD PRESSURE: 78 MMHG | SYSTOLIC BLOOD PRESSURE: 142 MMHG

## 2023-07-18 DIAGNOSIS — I10 ESSENTIAL HYPERTENSION: Chronic | ICD-10-CM

## 2023-07-18 DIAGNOSIS — R07.81 RIB PAIN ON RIGHT SIDE: ICD-10-CM

## 2023-07-18 DIAGNOSIS — Z91.81 HISTORY OF FALL: ICD-10-CM

## 2023-07-18 DIAGNOSIS — J30.2 SEASONAL ALLERGIES: ICD-10-CM

## 2023-07-18 PROCEDURE — 3077F SYST BP >= 140 MM HG: CPT | Performed by: FAMILY MEDICINE

## 2023-07-18 PROCEDURE — 99214 OFFICE O/P EST MOD 30 MIN: CPT | Performed by: FAMILY MEDICINE

## 2023-07-18 PROCEDURE — 3078F DIAST BP <80 MM HG: CPT | Performed by: FAMILY MEDICINE

## 2023-07-18 RX ORDER — OXYCODONE HYDROCHLORIDE AND ACETAMINOPHEN 5; 325 MG/1; MG/1
1 TABLET ORAL EVERY 8 HOURS PRN
Qty: 21 TABLET | Refills: 0 | Status: SHIPPED | OUTPATIENT
Start: 2023-07-18 | End: 2023-07-25

## 2023-07-18 RX ORDER — TRAMADOL HYDROCHLORIDE 50 MG/1
50 TABLET ORAL EVERY 8 HOURS PRN
Qty: 21 TABLET | Refills: 0 | Status: SHIPPED | OUTPATIENT
Start: 2023-07-18 | End: 2023-07-25

## 2023-07-18 NOTE — PROGRESS NOTES
Chief Complaint   Patient presents with    Seasonal Allergies    Flank Pain     Right side from fall    Fall       HPI:  Patient is a 82 y.o. female established patient who presents today for evaluation of new right flank/rib cage pain related to MGLF on Friday. She fell onto a hard bench while on vacation in [place] and denies LOC/head trauma/right hip pain. Her son was able to help her back on her feet, and she reports escalating pain not helped with supportive care measures. She flew back to [place] today and presented to my office for assistance. She also reports significant increase in seasonal allergies symptoms while in [place] without associated acute illness concerns. She is ambulating slowly without assistance and is otherwise stable at this time.     Patient Active Problem List    Diagnosis Date Noted    Chronic pain of left knee 06/21/2023    OAB (overactive bladder) 02/07/2023    Post-nasal drip 02/07/2023    Lumbar foraminal stenosis 02/07/2023    Chronic back pain 02/07/2023    Elevated hemoglobin A1c 02/07/2023    Stage 3a chronic kidney disease (HCC) 07/10/2022    SHIRA (obstructive sleep apnea) 06/21/2021    Epidermal cyst 10/16/2018    Seborrheic keratosis 10/16/2018    Microscopic colitis 09/05/2017    Essential hypertension 12/01/2015    Mixed hyperlipidemia with apolipoprotein E2 variant 04/09/2015    Insulin resistance 04/09/2015    Metabolic syndrome 04/09/2015    Sinus bradycardia 03/16/2015    Eczema 10/14/2014    Osteoporosis, post-menopausal 10/14/2014    Environmental allergies 04/18/2014    Vitamin D deficiency disease 11/13/2013    Dupuytren's contracture of both hands 05/22/2013    Degenerative joint disease        Past medical, surgical, family, and social history was reviewed and updated in Epic chart by me today.     Medications and allergies reviewed and updated in Epic chart by me today.     ROS:  Pertinent positives listed above in HPI. All other systems have been reviewed and are  "negative.    PE:   BP (!) 142/78   Pulse (!) 57   Temp 36.8 °C (98.2 °F)   Resp 14   Ht 1.626 m (5' 4.02\")   LMP 01/01/2002   SpO2 94%   BMI 31.85 kg/m²   Vital signs reviewed with patient.     Gen: Well developed; well nourished; no acute distress; non toxic appearance   CV: Regular rate and rhythm; S1/ S2 present; no murmur, gallop or rub noted  Right medial sclera is bloodshot - she denies related pain nor vision changes  Pulm: No respiratory distress; clear to ascultation b/l; no wheezing or stridor noted b/l  Right flank: exquisitely tender to touch along bottom portion of right rib cage - no overlying skin changes nor ecchymosis  Abd: Adequate bowel sounds noted; soft and nontender; no rebound, rigidity, nor distention  Extremities: no clubbing nor cyanosis noted  Skin: Warm and dry; no rashes noted   Neuro: No focal deficits noted; walking independently   Psych: AAOx4; mood and affect are at baseline     A/P:  1. History of fall  Patient sustained MGLF Friday, while on vacation in [place], as described above in HPI. Refer to #2.   - [id number] (WITH 1-VIEW CXR) RIGHT; Future    2. Rib pain on right side  New condition that occurred after MGLF on Friday while on vacation in [place]. Although patient does not have overlying skin changes nor obvious bruising, she has exquisite tenderness with palpation over right lower lateral rib cage area. Condition discussed with patient and recommend patient obtain xrays for further follow up. OTC medication is not appropriate for adequate pain management, and I will prescribe Ultram for pain management. Consent for opiate prescription reviewed and signed at visit today.  reviewed today and no concerns noted. Pt is well versed in safe use of controlled medication, and benefit of use outweighs risk at this time. New RX sent to pharmacy, and I encouraged patient to not drive while taking this medication. I also recommend patient apply ice to affected areas " (cover skin) 20 minutes on/ 60 minutes off for additional management.   - [id number] (WITH 1-VIEW CXR) RIGHT; Future  - traMADol (ULTRAM) 50 MG Tab; Take 1 Tablet by mouth every 8 hours as needed for Moderate Pain for up to 7 days.  Dispense: 21 Tablet; Refill: 0  - Consent for Opiate Prescription    3. Seasonal allergies  Patient reports significant increase in seasonal allergy symptoms since being on vacation in [place]. Recommend patient restart home allergy control medication and nasal spray daily (has both at home) and follow clinical response.     4. Essential hypertension  Blood pressure elevated today due to acute pain issues as described above in HPI. Will follow closely.

## 2023-07-19 ENCOUNTER — HOSPITAL ENCOUNTER (OUTPATIENT)
Dept: RADIOLOGY | Facility: MEDICAL CENTER | Age: 83
End: 2023-07-19
Attending: FAMILY MEDICINE
Payer: MEDICARE

## 2023-07-19 DIAGNOSIS — R07.81 RIB PAIN ON RIGHT SIDE: ICD-10-CM

## 2023-07-19 DIAGNOSIS — Z91.81 HISTORY OF FALL: ICD-10-CM

## 2023-07-19 PROCEDURE — 71101 X-RAY EXAM UNILAT RIBS/CHEST: CPT | Mod: RT

## 2023-07-27 ENCOUNTER — HOSPITAL ENCOUNTER (OUTPATIENT)
Facility: MEDICAL CENTER | Age: 83
End: 2023-07-27
Attending: FAMILY MEDICINE
Payer: MEDICARE

## 2023-07-27 ENCOUNTER — NON-PROVIDER VISIT (OUTPATIENT)
Dept: INTERNAL MEDICINE | Facility: IMAGING CENTER | Age: 83
End: 2023-07-27
Payer: MEDICARE

## 2023-07-27 DIAGNOSIS — R53.83 OTHER FATIGUE: ICD-10-CM

## 2023-07-27 DIAGNOSIS — K52.838 OTHER MICROSCOPIC COLITIS: ICD-10-CM

## 2023-07-27 DIAGNOSIS — E78.2 MIXED DYSLIPIDEMIA: ICD-10-CM

## 2023-07-27 DIAGNOSIS — E11.9 TYPE 2 DIABETES MELLITUS WITHOUT COMPLICATION, WITHOUT LONG-TERM CURRENT USE OF INSULIN (HCC): ICD-10-CM

## 2023-07-27 DIAGNOSIS — Z23 NEED FOR VACCINATION: ICD-10-CM

## 2023-07-27 DIAGNOSIS — D75.89 MACROCYTOSIS: ICD-10-CM

## 2023-07-27 DIAGNOSIS — E55.9 VITAMIN D DEFICIENCY: ICD-10-CM

## 2023-07-27 DIAGNOSIS — I10 ESSENTIAL HYPERTENSION: ICD-10-CM

## 2023-07-27 LAB
25(OH)D3 SERPL-MCNC: 39 NG/ML (ref 30–100)
ALBUMIN SERPL BCP-MCNC: 4 G/DL (ref 3.2–4.9)
ALBUMIN/GLOB SERPL: 1.3 G/DL
ALP SERPL-CCNC: 94 U/L (ref 30–99)
ALT SERPL-CCNC: 27 U/L (ref 2–50)
ANION GAP SERPL CALC-SCNC: 9 MMOL/L (ref 7–16)
AST SERPL-CCNC: 22 U/L (ref 12–45)
BASOPHILS # BLD AUTO: 0.4 % (ref 0–1.8)
BASOPHILS # BLD: 0.03 K/UL (ref 0–0.12)
BILIRUB SERPL-MCNC: 0.3 MG/DL (ref 0.1–1.5)
BUN SERPL-MCNC: 28 MG/DL (ref 8–22)
CALCIUM ALBUM COR SERPL-MCNC: 9.7 MG/DL (ref 8.5–10.5)
CALCIUM SERPL-MCNC: 9.7 MG/DL (ref 8.5–10.5)
CHLORIDE SERPL-SCNC: 104 MMOL/L (ref 96–112)
CHOLEST SERPL-MCNC: 127 MG/DL (ref 100–199)
CO2 SERPL-SCNC: 28 MMOL/L (ref 20–33)
CREAT SERPL-MCNC: 1.05 MG/DL (ref 0.5–1.4)
CREAT UR-MCNC: 147.97 MG/DL
CRP SERPL HS-MCNC: <0.3 MG/DL (ref 0–0.75)
EOSINOPHIL # BLD AUTO: 0.27 K/UL (ref 0–0.51)
EOSINOPHIL NFR BLD: 3.6 % (ref 0–6.9)
ERYTHROCYTE [DISTWIDTH] IN BLOOD BY AUTOMATED COUNT: 46.4 FL (ref 35.9–50)
EST. AVERAGE GLUCOSE BLD GHB EST-MCNC: 140 MG/DL
FOLATE SERPL-MCNC: 14.5 NG/ML
GFR SERPLBLD CREATININE-BSD FMLA CKD-EPI: 52 ML/MIN/1.73 M 2
GLOBULIN SER CALC-MCNC: 3.2 G/DL (ref 1.9–3.5)
GLUCOSE SERPL-MCNC: 110 MG/DL (ref 65–99)
HBA1C MFR BLD: 6.5 % (ref 4–5.6)
HCT VFR BLD AUTO: 43.4 % (ref 37–47)
HDLC SERPL-MCNC: 52 MG/DL
HGB BLD-MCNC: 14.3 G/DL (ref 12–16)
IMM GRANULOCYTES # BLD AUTO: 0.02 K/UL (ref 0–0.11)
IMM GRANULOCYTES NFR BLD AUTO: 0.3 % (ref 0–0.9)
LDLC SERPL CALC-MCNC: 56 MG/DL
LYMPHOCYTES # BLD AUTO: 2.77 K/UL (ref 1–4.8)
LYMPHOCYTES NFR BLD: 36.4 % (ref 22–41)
MCH RBC QN AUTO: 31 PG (ref 27–33)
MCHC RBC AUTO-ENTMCNC: 32.9 G/DL (ref 32.2–35.5)
MCV RBC AUTO: 93.9 FL (ref 81.4–97.8)
MICROALBUMIN UR-MCNC: 2.2 MG/DL
MICROALBUMIN/CREAT UR: 15 MG/G (ref 0–30)
MONOCYTES # BLD AUTO: 0.56 K/UL (ref 0–0.85)
MONOCYTES NFR BLD AUTO: 7.4 % (ref 0–13.4)
NEUTROPHILS # BLD AUTO: 3.95 K/UL (ref 1.82–7.42)
NEUTROPHILS NFR BLD: 51.9 % (ref 44–72)
NRBC # BLD AUTO: 0 K/UL
NRBC BLD-RTO: 0 /100 WBC (ref 0–0.2)
PLATELET # BLD AUTO: 239 K/UL (ref 164–446)
PMV BLD AUTO: 10.6 FL (ref 9–12.9)
POTASSIUM SERPL-SCNC: 4.2 MMOL/L (ref 3.6–5.5)
PROT SERPL-MCNC: 7.2 G/DL (ref 6–8.2)
RBC # BLD AUTO: 4.62 M/UL (ref 4.2–5.4)
SODIUM SERPL-SCNC: 141 MMOL/L (ref 135–145)
T4 FREE SERPL-MCNC: 1.47 NG/DL (ref 0.93–1.7)
TRIGL SERPL-MCNC: 96 MG/DL (ref 0–149)
TSH SERPL DL<=0.005 MIU/L-ACNC: 1.52 UIU/ML (ref 0.38–5.33)
VIT B12 SERPL-MCNC: 508 PG/ML (ref 211–911)
WBC # BLD AUTO: 7.6 K/UL (ref 4.8–10.8)

## 2023-07-27 PROCEDURE — 84439 ASSAY OF FREE THYROXINE: CPT

## 2023-07-27 PROCEDURE — 85652 RBC SED RATE AUTOMATED: CPT

## 2023-07-27 PROCEDURE — 82306 VITAMIN D 25 HYDROXY: CPT

## 2023-07-27 PROCEDURE — 90471 IMMUNIZATION ADMIN: CPT | Performed by: FAMILY MEDICINE

## 2023-07-27 PROCEDURE — 90715 TDAP VACCINE 7 YRS/> IM: CPT | Performed by: FAMILY MEDICINE

## 2023-07-27 PROCEDURE — 83036 HEMOGLOBIN GLYCOSYLATED A1C: CPT

## 2023-07-27 PROCEDURE — 86140 C-REACTIVE PROTEIN: CPT

## 2023-07-27 PROCEDURE — 82570 ASSAY OF URINE CREATININE: CPT

## 2023-07-27 PROCEDURE — 85025 COMPLETE CBC W/AUTO DIFF WBC: CPT

## 2023-07-27 PROCEDURE — 84443 ASSAY THYROID STIM HORMONE: CPT

## 2023-07-27 PROCEDURE — 80053 COMPREHEN METABOLIC PANEL: CPT

## 2023-07-27 PROCEDURE — 82746 ASSAY OF FOLIC ACID SERUM: CPT

## 2023-07-27 PROCEDURE — 82607 VITAMIN B-12: CPT

## 2023-07-27 PROCEDURE — 80061 LIPID PANEL: CPT

## 2023-07-27 PROCEDURE — 82043 UR ALBUMIN QUANTITATIVE: CPT

## 2023-07-28 LAB — ERYTHROCYTE [SEDIMENTATION RATE] IN BLOOD BY WESTERGREN METHOD: 20 MM/HOUR (ref 0–25)

## 2023-08-02 ENCOUNTER — APPOINTMENT (RX ONLY)
Dept: URBAN - METROPOLITAN AREA CLINIC 22 | Facility: CLINIC | Age: 83
Setting detail: DERMATOLOGY
End: 2023-08-02

## 2023-08-02 DIAGNOSIS — D22 MELANOCYTIC NEVI: ICD-10-CM

## 2023-08-02 DIAGNOSIS — L57.0 ACTINIC KERATOSIS: ICD-10-CM

## 2023-08-02 DIAGNOSIS — L82.1 OTHER SEBORRHEIC KERATOSIS: ICD-10-CM

## 2023-08-02 DIAGNOSIS — L81.4 OTHER MELANIN HYPERPIGMENTATION: ICD-10-CM

## 2023-08-02 DIAGNOSIS — L82.0 INFLAMED SEBORRHEIC KERATOSIS: ICD-10-CM

## 2023-08-02 DIAGNOSIS — D18.0 HEMANGIOMA: ICD-10-CM

## 2023-08-02 DIAGNOSIS — Z71.89 OTHER SPECIFIED COUNSELING: ICD-10-CM

## 2023-08-02 PROBLEM — D22.5 MELANOCYTIC NEVI OF TRUNK: Status: ACTIVE | Noted: 2023-08-02

## 2023-08-02 PROBLEM — D18.01 HEMANGIOMA OF SKIN AND SUBCUTANEOUS TISSUE: Status: ACTIVE | Noted: 2023-08-02

## 2023-08-02 PROCEDURE — 17110 DESTRUCTION B9 LES UP TO 14: CPT

## 2023-08-02 PROCEDURE — ? SUNSCREEN RECOMMENDATIONS

## 2023-08-02 PROCEDURE — 17000 DESTRUCT PREMALG LESION: CPT | Mod: 59

## 2023-08-02 PROCEDURE — 17003 DESTRUCT PREMALG LES 2-14: CPT | Mod: 59

## 2023-08-02 PROCEDURE — ? COUNSELING

## 2023-08-02 PROCEDURE — 99213 OFFICE O/P EST LOW 20 MIN: CPT | Mod: 25

## 2023-08-02 PROCEDURE — ? LIQUID NITROGEN

## 2023-08-02 ASSESSMENT — LOCATION SIMPLE DESCRIPTION DERM
LOCATION SIMPLE: LEFT FOREARM
LOCATION SIMPLE: RIGHT POSTERIOR UPPER ARM
LOCATION SIMPLE: CHEST
LOCATION SIMPLE: RIGHT UPPER BACK
LOCATION SIMPLE: NOSE
LOCATION SIMPLE: NECK
LOCATION SIMPLE: RIGHT FOREHEAD
LOCATION SIMPLE: ABDOMEN
LOCATION SIMPLE: LEFT UPPER BACK

## 2023-08-02 ASSESSMENT — LOCATION DETAILED DESCRIPTION DERM
LOCATION DETAILED: LEFT PROXIMAL DORSAL FOREARM
LOCATION DETAILED: LEFT MEDIAL SUPERIOR CHEST
LOCATION DETAILED: RIGHT MID-UPPER BACK
LOCATION DETAILED: LEFT LATERAL ABDOMEN
LOCATION DETAILED: NASAL DORSUM
LOCATION DETAILED: UPPER STERNUM
LOCATION DETAILED: RIGHT DISTAL POSTERIOR UPPER ARM
LOCATION DETAILED: RIGHT INFERIOR LATERAL FOREHEAD
LOCATION DETAILED: LEFT CENTRAL LATERAL NECK
LOCATION DETAILED: LEFT INFERIOR UPPER BACK

## 2023-08-02 ASSESSMENT — LOCATION ZONE DERM
LOCATION ZONE: ARM
LOCATION ZONE: NECK
LOCATION ZONE: TRUNK
LOCATION ZONE: NOSE
LOCATION ZONE: FACE

## 2023-08-02 NOTE — PROCEDURE: LIQUID NITROGEN
Show Applicator Variable?: Yes
Duration Of Freeze Thaw-Cycle (Seconds): 3
Render Note In Bullet Format When Appropriate: No
Post-Care Instructions: I reviewed with the patient in detail post-care instructions. Patient is to wear sunprotection, and avoid picking at any of the treated lesions. Pt may apply Vaseline to crusted or scabbing areas.
Number Of Freeze-Thaw Cycles: 2 freeze-thaw cycles
Consent: The patient's consent was obtained including but not limited to risks of crusting, scabbing, blistering, scarring, darker or lighter pigmentary change, recurrence, incomplete removal and infection.
Detail Level: Detailed
Medical Necessity Clause: This procedure was medically necessary because the lesions that were treated were:
Medical Necessity Information: It is in your best interest to select a reason for this procedure from the list below. All of these items fulfill various CMS LCD requirements except the new and changing color options.
Spray Paint Text: The liquid nitrogen was applied to the skin utilizing a spray paint frosting technique.
Consent: The patient's mom’s consent was obtained including but not limited to risks of crusting, scabbing, blistering, scarring, darker or lighter pigmentary change, recurrence, incomplete removal and infection.

## 2023-08-14 ENCOUNTER — OFFICE VISIT (OUTPATIENT)
Dept: INTERNAL MEDICINE | Facility: IMAGING CENTER | Age: 83
End: 2023-08-14
Payer: MEDICARE

## 2023-08-14 VITALS
DIASTOLIC BLOOD PRESSURE: 68 MMHG | BODY MASS INDEX: 32.64 KG/M2 | HEART RATE: 53 BPM | HEIGHT: 64 IN | RESPIRATION RATE: 17 BRPM | SYSTOLIC BLOOD PRESSURE: 132 MMHG | WEIGHT: 191.2 LBS | TEMPERATURE: 98.1 F | OXYGEN SATURATION: 97 %

## 2023-08-14 DIAGNOSIS — Z12.31 BREAST CANCER SCREENING BY MAMMOGRAM: ICD-10-CM

## 2023-08-14 DIAGNOSIS — E78.2 MIXED HYPERLIPIDEMIA WITH APOLIPOPROTEIN E2 VARIANT: Chronic | ICD-10-CM

## 2023-08-14 DIAGNOSIS — Z00.00 ENCOUNTER FOR MEDICARE ANNUAL WELLNESS EXAM: ICD-10-CM

## 2023-08-14 DIAGNOSIS — E28.39 ESTROGEN DEFICIENCY: ICD-10-CM

## 2023-08-14 DIAGNOSIS — S22.31XS CLOSED FRACTURE OF ONE RIB OF RIGHT SIDE, SEQUELA: ICD-10-CM

## 2023-08-14 DIAGNOSIS — E55.9 VITAMIN D DEFICIENCY: Chronic | ICD-10-CM

## 2023-08-14 DIAGNOSIS — R00.1 SINUS BRADYCARDIA: Chronic | ICD-10-CM

## 2023-08-14 DIAGNOSIS — M81.0 OSTEOPOROSIS, POST-MENOPAUSAL: Chronic | ICD-10-CM

## 2023-08-14 DIAGNOSIS — N32.81 OAB (OVERACTIVE BLADDER): ICD-10-CM

## 2023-08-14 DIAGNOSIS — J30.89 NON-SEASONAL ALLERGIC RHINITIS DUE TO OTHER ALLERGIC TRIGGER: ICD-10-CM

## 2023-08-14 DIAGNOSIS — R73.09 ELEVATED HEMOGLOBIN A1C: ICD-10-CM

## 2023-08-14 DIAGNOSIS — I10 ESSENTIAL HYPERTENSION: Chronic | ICD-10-CM

## 2023-08-14 DIAGNOSIS — M85.9 DISORDER OF BONE DENSITY AND STRUCTURE, UNSPECIFIED: ICD-10-CM

## 2023-08-14 DIAGNOSIS — N18.31 STAGE 3A CHRONIC KIDNEY DISEASE: ICD-10-CM

## 2023-08-14 DIAGNOSIS — Z91.81 RISK FOR FALLS: ICD-10-CM

## 2023-08-14 DIAGNOSIS — R09.82 POST-NASAL DRIP: ICD-10-CM

## 2023-08-14 PROBLEM — J30.9 ALLERGIC RHINITIS: Status: ACTIVE | Noted: 2023-08-14

## 2023-08-14 PROCEDURE — G0439 PPPS, SUBSEQ VISIT: HCPCS | Performed by: FAMILY MEDICINE

## 2023-08-14 PROCEDURE — 3078F DIAST BP <80 MM HG: CPT | Performed by: FAMILY MEDICINE

## 2023-08-14 PROCEDURE — 3075F SYST BP GE 130 - 139MM HG: CPT | Performed by: FAMILY MEDICINE

## 2023-08-14 ASSESSMENT — ACTIVITIES OF DAILY LIVING (ADL): BATHING_REQUIRES_ASSISTANCE: 0

## 2023-08-14 ASSESSMENT — FIBROSIS 4 INDEX: FIB4 SCORE: 1.47

## 2023-08-14 ASSESSMENT — ENCOUNTER SYMPTOMS: GENERAL WELL-BEING: FAIR

## 2023-08-14 ASSESSMENT — PATIENT HEALTH QUESTIONNAIRE - PHQ9: CLINICAL INTERPRETATION OF PHQ2 SCORE: 0

## 2023-08-14 NOTE — PROGRESS NOTES
CC:   Medicare Annual Wellness Visit    HPI:  Kacie is a 82 y.o. female here for her Medicare Annual Wellness Visit and to review labs done 7/27/23. Patient continues to recover from right ninth rib fracture sustained after MGLF in July while on vacation. She reports pain has improved greatly, and she denies any new related symptoms at this time. She suffers from chronic allergic rhinitis that is no longer seasonal in nature, and she is currently taking Allegra and using daily nasal sprays without adequate symptom management. She has seen ENT in the past for this condition but felt appointment was a waste of time. She has a history of microscopic colitis managed by her GI team, and chronic essential HTN managed with ongoing daily medication use. She also has history of chronic vitamin D deficiency with daily vitamin D supplementation. She has chronic metabolic syndrome/insulin resistance without current DM diagnosis, and known osteoporosis - h/o Reclast infusion use. She has annual eye exam scheduled for next week and is still hopeful for a visit with Dr. Acharya this calendar year. She is aware of vaccine eligibility, denies new mental health concerns, and is in good spirits today.     Patient Active Problem List    Diagnosis Date Noted    Risk for falls 08/14/2023    Allergic rhinitis 08/14/2023    Chronic pain of left knee 06/21/2023    OAB (overactive bladder) 02/07/2023    Post-nasal drip 02/07/2023    Lumbar foraminal stenosis 02/07/2023    Chronic back pain 02/07/2023    Elevated hemoglobin A1c 02/07/2023    Stage 3a chronic kidney disease (HCC) 07/10/2022    SHIRA (obstructive sleep apnea) 06/21/2021    Epidermal cyst 10/16/2018    Seborrheic keratosis 10/16/2018    Microscopic colitis 09/05/2017    Essential hypertension 12/01/2015    Mixed hyperlipidemia with apolipoprotein E2 variant 04/09/2015    Insulin resistance 04/09/2015    Metabolic syndrome 04/09/2015    Sinus bradycardia 03/16/2015    Eczema 10/14/2014     Osteoporosis, post-menopausal 10/14/2014    Environmental allergies 04/18/2014    Vitamin D deficiency disease 11/13/2013    Dupuytren's contracture of both hands 05/22/2013    Degenerative joint disease      Current Outpatient Medications   Medication Sig Dispense Refill    valsartan (DIOVAN) 320 MG tablet Take 1 Tablet by mouth every day. 90 Tablet 4    nebivolol (BYSTOLIC) 10 MG Tab Take 1 Tablet by mouth every day. 90 Tablet 4    rosuvastatin (CRESTOR) 10 MG Tab Take 1 Tablet by mouth every evening. 100 Tablet 4    Cholecalciferol (VITAMIN D3) 5000 UNITS CAPS Take 2,000 Units by mouth every day.      Coral Calcium 1000 (390 CA) MG TABS Take 1 Each by mouth every day. 60 Each      No current facility-administered medications for this visit.      Current supplements: see MAR   Chronic narcotic pain medicines: no  Allergies: Food, Sulfa drugs, Augmentin, and Nifedipine  Exercise: as able  Current social contact/activities: yes  Current mood: good  Advance Directive on file: no    Screening:  Depression Screening  Little interest or pleasure in doing things?  0 - not at all  Feeling down, depressed , or hopeless? 0 - not at all  Patient Health Questionnaire Score: 0     If depressive symptoms identified deferred to follow up visit unless specifically addressed in assessment and plan.    Interpretation of PHQ-9 Total Score   Score Severity   1-4 No Depression   5-9 Mild Depression   10-14 Moderate Depression   15-19 Moderately Severe Depression   20-27 Severe Depression    Screening for Cognitive Impairment  Three Minute Recall (daughter, heaven, mountain) 3/3    Omkar clock face with all 12 numbers and set the hands to show 10 past 11.  Yes    Cognitive concerns identified deferred for follow up unless specifically addressed in assessment and plan.    Fall Risk Assessment  Has the patient had two or more falls in the last year or any fall with injury in the last year?  Yes    Safety Assessment  Throw rugs on  floor.  No  Handrails on all stairs.  Yes  Good lighting in all hallways.  Yes  Difficulty hearing.  No  Patient counseled about all safety risks that were identified.    Functional Assessment ADLs  Are there any barriers preventing you from cooking for yourself or meeting nutritional needs?  No.    Are there any barriers preventing you from driving safely or obtaining transportation?  No.    Are there any barriers preventing you from using a telephone or calling for help?  No.    Are there any barriers preventing you from shopping?  No.    Are there any barriers preventing you from taking care of your own finances?  No.    Are there any barriers preventing you from managing your medications?  No.    Are there any barriers preventing you from showering, bathing or dressing yourself?  No.    Are you currently engaging in any exercise or physical activity?  No.     What is your perception of your health?  Fair    Advance Care Planning  Do you have an Advance Directive, Living Will, Durable Power of , or POLST?                   Health Maintenance Summary            Overdue - RETINAL SCREENING (Yearly) Overdue - never done      No completion history exists for this topic.              Ordered - BONE DENSITY (Every 2 Years) Ordered on 8/14/2023 12/02/2019  DS-BONE DENSITY STUDY (DEXA)    10/22/2014  DS-BONE DENSITY STUDY (DEXA)    10/19/2012  DS-BONE DENSITY STUDY (DEXA)    02/18/2010  DS-BONE DENSITY STUDY (DEXA)    09/30/2008  DS-BONE DENSITY STUDY (DEXA)              Overdue - COVID-19 Vaccine (4 - Pfizer series) Overdue since 12/8/2022      10/13/2022  Imm Admin: MODERNA BIVALENT BOOSTER SARS-COV-2 VACCINE (6+)    12/16/2021  Imm Admin: PFIZER PURPLE CAP SARS-COV-2 VACCINATION (12+)    02/05/2021  Imm Admin: PFIZER PURPLE CAP SARS-COV-2 VACCINATION (12+)    01/15/2021  Imm Admin: PFIZER PURPLE CAP SARS-COV-2 VACCINATION (12+)              IMM INFLUENZA (1) Next due on 9/1/2023 11/04/2022  Imm  Admin: Influenza Vaccine Adult HD    11/01/2021  Imm Admin: Influenza Vaccine Adult HD    10/02/2020  Imm Admin: Influenza Vaccine Adult HD    09/16/2019  Imm Admin: Influenza Vaccine Adult HD    10/05/2018  Imm Admin: Influenza Vaccine Adult HD    Only the first 5 history entries have been loaded, but more history exists.              Ordered - MAMMOGRAM (Yearly) Ordered on 8/14/2023      10/07/2022  MA-SCREENING MAMMO BILAT W/TOMOSYNTHESIS W/CAD    08/02/2021  MA-SCREENING MAMMO BILAT W/TOMOSYNTHESIS W/CAD    12/17/2018  MA-SCREENING MAMMO BILAT W/TOMOSYNTHESIS W/CAD    10/31/2017  MA-MAMMO SCREENING BILAT W/DEEPTI W/CAD    03/02/2016  MA-SCREEN MAMMO W/CAD-BILAT    Only the first 5 history entries have been loaded, but more history exists.              A1C SCREENING (Every 6 Months) Next due on 1/27/2024 07/27/2023  HEMOGLOBIN A1C    11/11/2022  HEMOGLOBIN A1C    06/15/2022  HEMOGLOBIN A1C    06/15/2021  HEMOGLOBIN A1C    05/05/2020  HEMOGLOBIN A1C    Only the first 5 history entries have been loaded, but more history exists.              FASTING LIPID PROFILE (Yearly) Next due on 7/27/2024 07/27/2023  Lipid Profile    11/11/2022  Lipid Profile    06/15/2022  Lipid Profile    06/15/2021  Lipid Profile    05/05/2020  Lipid Profile    Only the first 5 history entries have been loaded, but more history exists.              URINE ACR / MICROALBUMIN (Yearly) Next due on 7/27/2024 07/27/2023  MICROALBUMIN CREAT RATIO URINE    06/15/2022  MICROALBUMIN CREAT RATIO URINE    06/15/2021  MICROALBUMIN CREAT RATIO URINE    05/05/2020  MICROALBUMIN CREAT RATIO URINE    08/07/2018  MICROALBUMIN CREAT RATIO URINE    Only the first 5 history entries have been loaded, but more history exists.              SERUM CREATININE (Yearly) Next due on 7/27/2024 07/27/2023  Comp Metabolic Panel    12/14/2022  Comp Metabolic Panel    11/11/2022  Comp Metabolic Panel    11/07/2022  CMP    06/15/2022  Comp Metabolic Panel     Only the first 5 history entries have been loaded, but more history exists.              Annual Wellness Visit (Every 366 Days) Next due on 8/14/2024 08/14/2023  Visit Dx: Encounter for Medicare annual wellness exam    08/14/2023  Subsequent Annual Wellness Visit - Includes PPPS ()    07/10/2022  Subsequent Annual Wellness Visit - Includes PPPS ()    07/08/2022  Visit Dx: Encounter for Medicare annual wellness exam    06/21/2021  Subsequent Annual Wellness Visit - Includes PPPS ()    Only the first 5 history entries have been loaded, but more history exists.              IMM DTaP/Tdap/Td Vaccine (3 - Td or Tdap) Next due on 7/27/2033 07/27/2023  Imm Admin: Tdap Vaccine    05/22/2013  Imm Admin: Tdap Vaccine              IMM PNEUMOCOCCAL VACCINE: 65+ Years (Series Information) Completed      12/08/2014  Imm Admin: Pneumococcal Conjugate Vaccine (Prevnar/PCV-13)    11/13/2010  Imm Admin: Pneumococcal polysaccharide vaccine (PPSV-23)    01/01/2006  Imm Admin: Pneumococcal polysaccharide vaccine (PPSV-23)              IMM ZOSTER VACCINES (Series Information) Completed      11/02/2018  Imm Admin: Zoster Vaccine Recombinant (RZV) (SHINGRIX)    08/08/2018  Imm Admin: Zoster Vaccine Recombinant (RZV) (SHINGRIX)    12/10/2008  Imm Admin: Zoster Vaccine Live (ZVL) (Zostavax) - HISTORICAL DATA    11/13/2007  Imm Admin: Zoster Vaccine Live (ZVL) (Zostavax) - HISTORICAL DATA              HPV Vaccines (Series Information) Aged Out      No completion history exists for this topic.              IMM MENINGOCOCCAL ACWY VACCINE (Series Information) Aged Out      No completion history exists for this topic.              Discontinued - COLORECTAL CANCER SCREENING  Discontinued        Frequency changed to Never automatically (Topic No Longer Applies)    11/16/2022  AMB EXTERNAL COLONOSCOPY RESULTS    11/07/2022  AMB EXTERNAL COLONOSCOPY RESULTS    09/07/2010  REFERRAL TO GI FOR COLONOSCOPY    05/22/2010   COLONOSCOPY (Done)              Discontinued - IMM HEP B VACCINE  Discontinued      No completion history exists for this topic.              Discontinued - DIABETES MONOFILAMENT / LE EXAM  Discontinued      No completion history exists for this topic.                    Patient Care Team:  Ally Barroso M.D. as PCP - General (Family Medicine)  Ally Barroso M.D. as PCP - [facility] (Family Medicine)  Preferred home care  as Respiratory Therapist  Irlanda Sanches R.N.      Social History     Tobacco Use    Smoking status: Never    Smokeless tobacco: Never   Vaping Use    Vaping Use: Never used   Substance Use Topics    Alcohol use: Yes     Comment: Occ    Drug use: No     Family History   Problem Relation Age of Onset    Lung Disease Mother     Cancer Mother         lung cancer    Cancer Paternal Aunt         breast     She  has a past medical history of Anxiety, Arthritis (03/12/15), Chickenpox, Degenerative joint disease, Epidermal cyst (10/16/2018), [place] measles, High cholesterol, HTN (hypertension), benign, Hyperlipemia, Hypertension, Microscopic colitis (2010), Neck pain on left side (5/31/2017), Osteoporosis, Other specified symptom associated with female genital organs, Seborrheic keratosis (10/16/2018), and Snoring.    She has no past medical history of Anesthesia, Cold, or Dental disorder.   Past Surgical History:   Procedure Laterality Date    HYSTEROSCOPY WITH VIDEO DIAGNOSTIC  3/13/2015    Performed by Alexandria Keenan M.D. at [facility]    DILATION AND CURETTAGE  3/13/2015    Performed by Alexandria Keenan M.D. at [facility]    BREAST BIOPSY  11/13/08    Performed by ISRAEL HICKS at [facility]    COLON RESECTION  2000    polyps    APPENDECTOMY      OTHER NEUROLOGICAL SURG      TONSILLECTOMY      US-NEEDLE CORE BX-BREAST PANEL         ROS:    All positives noted in HPI. All others reviewed and are negative.    Ostomy or other  "tubes or amputations: no  Chronic oxygen use: no  Last eye exam: exam scheduled for next week  : denies urinary incontinence; does not interfere with ADLs/ sleep  Gait: stable  Problems with balance/ difficulty walking: not currently   Hearing: adequate  Dentition: adequate    Lab results 7/27/23 reviewed with patient at visit today.     Exam:   /68 (BP Location: Left arm, Patient Position: Sitting, BP Cuff Size: Adult)   Pulse (!) 53   Temp 36.7 °C (98.1 °F) (Temporal)   Resp 17   Ht 1.626 m (5' 4\")   Wt 86.7 kg (191 lb 3.2 oz)   SpO2 97%  Body mass index is 32.82 kg/m².    Gen: Well developed; well nourished; no acute distress; age appropriate appearance   HEENT: Normocephalic; atraumatic; PEERLA b/l; sclera clear b/l; b/l external auditory canals WNL; b/l TM WNL; nares patent; oropharynx clear; oral mucosa moist; tongue midline; dentition adequate   Neck: No adenopathy; no thyromegaly  CV: Regular rate and rhythm; S1/ S2 present; no murmur, gallop or rub noted  Pulm: No respiratory distress; clear to ascultation b/l; no wheezing or stridor noted b/l  Abd: Adequate bowel sounds noted; soft and nontender; no rebound, rigidity, nor distention  Extremities: improved peripheral edema b/l LE extremities/ no clubbing nor cyanosis noted  Skin: Warm and dry; no rashes noted   Neuro: No focal deficits noted; pt is able to get up out of chair unassisted and walk forward  Psych: AAOx4; mood and affect are appropriate    Assessment and Plan:  1. Essential hypertension  Stable/ recommend patient continue daily Bystolic and Diovan use.   - Subsequent Annual Wellness Visit - Includes PPPS ()    2. Risk for falls  - Patient identified as fall risk.  Appropriate orders and counseling given.  - Subsequent Annual Wellness Visit - Includes PPPS ()    3. Non-seasonal allergic rhinitis due to other allergic trigger  Ongoing issue for patient previously evaluated by ENT. Recommend patient switch from daily " Allegra to Xyzal use and continue current daily nasal sprays.   - Subsequent Annual Wellness Visit - Includes PPPS ()    4. Post-nasal drip  Ongoing issue for patient - recommend patient trial plain Mucinex HS use for management of PND build up in posterior oropharynx upon wakening each morning. Will follow.   - Subsequent Annual Wellness Visit - Includes PPPS ()    5. Elevated hemoglobin A1c  Uncontrolled/ A1c is now 6.5% and we discussed that this is the threshold for DM diagnosis. Recommend patient daly in on her current diet choices (making sure to eat plenty of protein with each meal) and repeat A1c in three months for ongoing management. She has annual eye exam scheduled for next week and may be given an appointment with Dr. Acharya before the end of the year.   - Subsequent Annual Wellness Visit - Includes PPPS ()    6. OAB (overactive bladder)  Chronic condition for patient - prior referral made to [facility] for specialty evaluation, but patient recently informed they are no longer taking OAB patients. She declines referral to [facility] and will self monitor condition for now.   - Subsequent Annual Wellness Visit - Includes PPPS ()    7. Stage 3a chronic kidney disease (HCC)  Stable/ patient is well versed about avoiding nephrotoxins and maintaining adequate hydration. Will follow.   - Subsequent Annual Wellness Visit - Includes PPPS ()    8. Mixed hyperlipidemia with apolipoprotein E2 variant  Improved as compared to prior lipid panel. Recommend patient continue nightly Crestor use.  - Subsequent Annual Wellness Visit - Includes PPPS ()    9. Sinus bradycardia  Stable/ patient remains asymptomatic  - Subsequent Annual Wellness Visit - Includes PPPS ()    10. Closed fracture of one rib of right side, sequela  Patient sustained right ninth rib fracture (confirmed on xray 7/19/23) after sustaining a fall while on vacation. Patient reports that pain has improved greatly, and  she is able to do ADLs without concern.  - Subsequent Annual Wellness Visit - Includes PPPS ()    11. Osteoporosis, post-menopausal  Ongoing issue for patient managed historically with Reclast. Recommend patient obtain updated dexa scan with upcoming mammogram for ongoing management.   - Subsequent Annual Wellness Visit - Includes PPPS ()    12. Disorder of bone density and structure, unspecified  - DS-BONE DENSITY STUDY (DEXA); Future  - Subsequent Annual Wellness Visit - Includes PPPS ()    13. Estrogen deficiency  - DS-BONE DENSITY STUDY (DEXA); Future  - Subsequent Annual Wellness Visit - Includes PPPS ()    14. Vitamin D deficiency disease  Stable/ recommend patient continue current Vitamin D supplementation for maintenance.   - Subsequent Annual Wellness Visit - Includes PPPS ()    15. Breast cancer screening by mammogram  Patient will be due for annual screening mammogram in October for ongoing surveillance.   - MA-SCREENING MAMMO BILAT W/TOMOSYNTHESIS W/CAD; Future  - Subsequent Annual Wellness Visit - Includes PPPS ()    16. Encounter for Medicare annual wellness exam  Patient is stable overall and remains well informed about medical conditions that need additional attention moving forward.   - Subsequent Annual Wellness Visit - Includes PPPS ()       Services needed: no new services needed at this time  Health Care Screening: recommendations as per orders if indicated.  Referrals offered: none  Counseling provided today:  Prevent falls and reduce trip hazards; Secure or remove rugs if present   Maintain working fire alarm and carbon monoxide detectors   Engage in regular physical activity daily and social activities weekly as tolerated

## 2023-10-10 ENCOUNTER — OFFICE VISIT (OUTPATIENT)
Dept: SLEEP MEDICINE | Facility: MEDICAL CENTER | Age: 83
End: 2023-10-10
Attending: NURSE PRACTITIONER
Payer: MEDICARE

## 2023-10-10 VITALS
HEART RATE: 79 BPM | WEIGHT: 189 LBS | HEIGHT: 64 IN | BODY MASS INDEX: 32.27 KG/M2 | OXYGEN SATURATION: 92 % | DIASTOLIC BLOOD PRESSURE: 78 MMHG | SYSTOLIC BLOOD PRESSURE: 126 MMHG | RESPIRATION RATE: 16 BRPM

## 2023-10-10 DIAGNOSIS — I10 ESSENTIAL HYPERTENSION: Chronic | ICD-10-CM

## 2023-10-10 DIAGNOSIS — G47.33 OSA (OBSTRUCTIVE SLEEP APNEA): Chronic | ICD-10-CM

## 2023-10-10 DIAGNOSIS — Z78.9 NONSMOKER: ICD-10-CM

## 2023-10-10 PROCEDURE — 99213 OFFICE O/P EST LOW 20 MIN: CPT | Performed by: NURSE PRACTITIONER

## 2023-10-10 PROCEDURE — 99212 OFFICE O/P EST SF 10 MIN: CPT | Performed by: NURSE PRACTITIONER

## 2023-10-10 PROCEDURE — 3074F SYST BP LT 130 MM HG: CPT | Performed by: NURSE PRACTITIONER

## 2023-10-10 PROCEDURE — 3078F DIAST BP <80 MM HG: CPT | Performed by: NURSE PRACTITIONER

## 2023-10-10 ASSESSMENT — FIBROSIS 4 INDEX: FIB4 SCORE: 1.49

## 2023-10-10 NOTE — PROGRESS NOTES
Chief Complaint   Patient presents with    Follow-Up     Apnea // last seen 8/25/2022       HPI:  Kacie Rubalcava is a 83 y.o. year old female here today for follow-up on SHIRA.  Last OV 8/25/22     Currently using APAP @ 11-15cm H20 nightly; RESMED; device obtained 2020.  Compliance report 9/10/2023 through 10/9/2023 indicates 90% compliance, average nightly use 7 hours 10 minutes, mean pressure 13.2 cm, minimal to moderate mask leak with a reduced AHI of 4.5/h.  Reviewed with patient.  She tolerates mask and pressure well.  She denies morning headaches or lethargy.  She denies waking with shortness of breath, chest pain or chest tightness.  No GERD at night.  She notes it difficult to fall asleep 3-4 times per month generally goes to bed at 11 PM and fall sleep under 10 minutes.  She generally stays asleep until 5 AM to use the restroom and then resume sleep until 8 AM.  She denies daytime napping on regular basis.  She generally averages sleep for 6.5 to 7 hours per night.  She denies any respiratory or cardiac complaints today.  She notes for the last 2 years having persistent postnasal drip and runny nose.  She uses Mucinex twice daily.  She notes sometimes having a sneeze when her nasal mask is on.    Sleep history:  PSG split night study from 9/22/20 indicated severe obstructive sleep apnea, AHI: 83/h with desaturations to 58% SpO2. Successful CPAP titration at 11 cm of water with reduced AHI of 0 and O2 mean 93%.      Patient received the CPAP in November of 2020.      ROS: As per HPI and otherwise negative if not stated.    Past Medical History:   Diagnosis Date    Anxiety     Arthritis 03/12/15    generalized    Chickenpox     Degenerative joint disease     Epidermal cyst 10/16/2018    [place] measles     High cholesterol     HTN (hypertension), benign     Hyperlipemia     Hypertension     Microscopic colitis 2010    Neck pain on left side 5/31/2017    Osteoporosis     Other specified symptom associated  with female genital organs     post menopausal bleeding    Seborrheic keratosis 10/16/2018    Snoring        Past Surgical History:   Procedure Laterality Date    HYSTEROSCOPY WITH VIDEO DIAGNOSTIC  3/13/2015    Performed by Alexandria Keenan M.D. at [facility]    DILATION AND CURETTAGE  3/13/2015    Performed by Alexandria Keenan M.D. at [facility]    BREAST BIOPSY  11/13/08    Performed by ISRAEL HICKS at [facility] X3    COLON RESECTION  2000    polyps    APPENDECTOMY      OTHER NEUROLOGICAL SURG      TONSILLECTOMY      US-NEEDLE CORE BX-BREAST PANEL         Family History   Problem Relation Age of Onset    Lung Disease Mother     Cancer Mother         lung cancer    Cancer Paternal Aunt         breast       Social History     Socioeconomic History    Marital status: Single     Spouse name: Not on file    Number of children: Not on file    Years of education: Not on file    Highest education level: Not on file   Occupational History    Not on file   Tobacco Use    Smoking status: Never    Smokeless tobacco: Never   Vaping Use    Vaping Use: Never used   Substance and Sexual Activity    Alcohol use: Yes     Comment: Occ    Drug use: No    Sexual activity: Yes     Partners: Male   Other Topics Concern    Not on file   Social History Narrative    Not on file     Social Determinants of Health     Financial Resource Strain: Not on file   Food Insecurity: Not on file   Transportation Needs: Not on file   Physical Activity: Not on file   Stress: Not on file   Social Connections: Not on file   Intimate Partner Violence: Not on file   Housing Stability: Not on file       Allergies as of 10/10/2023 - Reviewed 10/10/2023   Allergen Reaction Noted    Food Anaphylaxis and Vomiting 10/09/2011    Sulfa drugs Anaphylaxis 11/30/2008    Augmentin Vomiting 05/11/2018    Nifedipine Swelling 02/27/2015        Vitals:  /78 (BP Location: Left arm, Patient Position:  "Sitting, BP Cuff Size: Adult)   Pulse 79   Resp 16   Ht 1.626 m (5' 4\")   Wt 85.7 kg (189 lb)   SpO2 92%     Current medications as of today   Current Outpatient Medications   Medication Sig Dispense Refill    valsartan (DIOVAN) 320 MG tablet Take 1 Tablet by mouth every day. 90 Tablet 4    nebivolol (BYSTOLIC) 10 MG Tab Take 1 Tablet by mouth every day. 90 Tablet 4    rosuvastatin (CRESTOR) 10 MG Tab Take 1 Tablet by mouth every evening. 100 Tablet 4    Cholecalciferol (VITAMIN D3) 5000 UNITS CAPS Take 2,000 Units by mouth every day.      Coral Calcium 1000 (390 CA) MG TABS Take 1 Each by mouth every day. 60 Each      No current facility-administered medications for this visit.         Physical Exam:   Gen:           Alert and oriented, No apparent distress. Mood and affect appropriate, normal interaction with examiner.  Eyes:          PERRL, EOM intact, sclere white, conjunctive moist.  Ears:          Not examined.   Hearing:     Grossly intact.  Nose:          Normal, no lesions or deformities.  Dentition:    Not examined.   Oropharynx:   Not examined.   Mallampati Classification: Not examined.   Neck:        Supple, trachea midline, no masses.  Respiratory Effort: No intercostal retractions or use of accessory muscles.   Lung Auscultation:      Clear to auscultation bilaterally; no rales, rhonchi or wheezing.  CV:            Regular rate and rhythm. No murmurs, rubs or gallops.  Abd:           Not examined.   Lymphadenopathy: Not examined.  Gait and Station: Normal.  Digits and Nails: No clubbing, cyanosis, petechiae, or nodes.   Cranial Nerves: II-XII grossly intact.  Skin:        No rashes, lesions or ulcers noted.               Ext:           No cyanosis or edema.      Assessment:  1. SHIRA (obstructive sleep apnea)        2. Essential hypertension        3. BMI 32.0-32.9,adult  HEIGHT AND WEIGHT      4. Nonsmoker            Immunizations:    Flu:9/9/23  Pneumovax 23:2010  Prevnar 13:2014  PCV 20: not " due  COVID-19: 10/13/22    Plan:  SHIRA is well controlled.  Continue auto CPAP 11 to 15 cm nightly.   DME mask/supplies  Follow-up with primary care for ongoing manage hypertension.  Encourage weight loss or healthy diet and exercise  Follow-up in 1 year with compliance report, sooner if needed.    Please note that this dictation was created using voice recognition software. I have made every reasonable attempt to correct obvious errors, but it is possible there are errors of grammar and possibly content that I did not discover before finalizing the note.

## 2023-10-11 ENCOUNTER — HOSPITAL ENCOUNTER (OUTPATIENT)
Dept: RADIOLOGY | Facility: MEDICAL CENTER | Age: 83
End: 2023-10-11
Attending: FAMILY MEDICINE
Payer: MEDICARE

## 2023-10-11 DIAGNOSIS — M85.9 DISORDER OF BONE DENSITY AND STRUCTURE, UNSPECIFIED: ICD-10-CM

## 2023-10-11 DIAGNOSIS — Z12.31 BREAST CANCER SCREENING BY MAMMOGRAM: ICD-10-CM

## 2023-10-11 DIAGNOSIS — E28.39 ESTROGEN DEFICIENCY: ICD-10-CM

## 2023-10-11 PROCEDURE — 77080 DXA BONE DENSITY AXIAL: CPT

## 2023-10-11 PROCEDURE — 77063 BREAST TOMOSYNTHESIS BI: CPT

## 2023-10-13 ENCOUNTER — TELEPHONE (OUTPATIENT)
Dept: INTERNAL MEDICINE | Facility: IMAGING CENTER | Age: 83
End: 2023-10-13
Payer: MEDICARE

## 2023-10-13 DIAGNOSIS — R92.8 ABNORMALITY OF LEFT BREAST ON SCREENING MAMMOGRAM: ICD-10-CM

## 2023-10-17 ENCOUNTER — HOSPITAL ENCOUNTER (OUTPATIENT)
Dept: RADIOLOGY | Facility: MEDICAL CENTER | Age: 83
End: 2023-10-17
Attending: FAMILY MEDICINE
Payer: MEDICARE

## 2023-10-17 DIAGNOSIS — R92.8 ABNORMALITY OF LEFT BREAST ON SCREENING MAMMOGRAM: ICD-10-CM

## 2023-10-17 PROCEDURE — 76642 ULTRASOUND BREAST LIMITED: CPT | Mod: LT

## 2023-10-31 ENCOUNTER — OFFICE VISIT (OUTPATIENT)
Dept: INTERNAL MEDICINE | Facility: IMAGING CENTER | Age: 83
End: 2023-10-31
Payer: MEDICARE

## 2023-10-31 VITALS
HEART RATE: 51 BPM | WEIGHT: 188.93 LBS | HEIGHT: 64 IN | OXYGEN SATURATION: 94 % | SYSTOLIC BLOOD PRESSURE: 126 MMHG | BODY MASS INDEX: 32.26 KG/M2 | TEMPERATURE: 98.3 F | RESPIRATION RATE: 17 BRPM | DIASTOLIC BLOOD PRESSURE: 70 MMHG

## 2023-10-31 DIAGNOSIS — K52.838 OTHER MICROSCOPIC COLITIS: ICD-10-CM

## 2023-10-31 DIAGNOSIS — R73.09 ELEVATED HEMOGLOBIN A1C: ICD-10-CM

## 2023-10-31 LAB
HBA1C MFR BLD: 6.2 % (ref ?–5.8)
POCT INT CON NEG: NEGATIVE
POCT INT CON POS: POSITIVE

## 2023-10-31 PROCEDURE — 99214 OFFICE O/P EST MOD 30 MIN: CPT | Performed by: FAMILY MEDICINE

## 2023-10-31 PROCEDURE — 83036 HEMOGLOBIN GLYCOSYLATED A1C: CPT | Performed by: FAMILY MEDICINE

## 2023-10-31 PROCEDURE — 3074F SYST BP LT 130 MM HG: CPT | Performed by: FAMILY MEDICINE

## 2023-10-31 PROCEDURE — 3078F DIAST BP <80 MM HG: CPT | Performed by: FAMILY MEDICINE

## 2023-10-31 RX ORDER — DIPHENOXYLATE HYDROCHLORIDE AND ATROPINE SULFATE 2.5; .025 MG/1; MG/1
1 TABLET ORAL 4 TIMES DAILY PRN
Qty: 28 TABLET | Refills: 3 | Status: SHIPPED | OUTPATIENT
Start: 2023-10-31 | End: 2023-11-07

## 2023-10-31 ASSESSMENT — FIBROSIS 4 INDEX: FIB4 SCORE: 1.49

## 2023-11-01 PROBLEM — N60.02 BREAST CYST, LEFT: Status: ACTIVE | Noted: 2023-11-01

## 2023-11-01 NOTE — PROGRESS NOTES
Chief Complaint   Patient presents with    Follow-Up     Repeat A1c today       HPI:  Patient is a 83 y.o. female established patient who presents today for a follow up appointment. She had history of elevated HgA1c and is due for repeat A1c testing today. She also has history of microscopic colitis and manages related sequela with PRN Lomotil use. She continues to benefit from Lomotil use, denies medication related side effects, and uses this controlled medication in a safe manner. Her annual eye exam is UTD, and she is in good spirits overall today.     Patient Active Problem List    Diagnosis Date Noted    Breast cyst, left 11/01/2023    Risk for falls 08/14/2023    Allergic rhinitis 08/14/2023    Chronic pain of left knee 06/21/2023    OAB (overactive bladder) 02/07/2023    Post-nasal drip 02/07/2023    Lumbar foraminal stenosis 02/07/2023    Chronic back pain 02/07/2023    Elevated hemoglobin A1c 02/07/2023    Stage 3a chronic kidney disease (HCC) 07/10/2022    SHIRA (obstructive sleep apnea) 06/21/2021    Epidermal cyst 10/16/2018    Seborrheic keratosis 10/16/2018    Microscopic colitis 09/05/2017    Essential hypertension 12/01/2015    Mixed hyperlipidemia with apolipoprotein E2 variant 04/09/2015    Insulin resistance 04/09/2015    Metabolic syndrome 04/09/2015    Sinus bradycardia 03/16/2015    Eczema 10/14/2014    Osteoporosis, post-menopausal 10/14/2014    Environmental allergies 04/18/2014    Vitamin D deficiency disease 11/13/2013    Dupuytren's contracture of both hands 05/22/2013    Degenerative joint disease        Past medical, surgical, family, and social history was reviewed and updated in Epic chart by me today.     Medications and allergies reviewed and updated in Epic chart by me today.     ROS:  Pertinent positives listed above in HPI. All other systems have been reviewed and are negative.    PE:   /70 (BP Location: Left arm, Patient Position: Sitting, BP Cuff Size: Adult)   Pulse (!) 51  "  Temp 36.8 °C (98.3 °F) (Temporal)   Resp 17   Ht 1.626 m (5' 4\")   Wt 85.7 kg (188 lb 15 oz)   LMP 01/01/2002   SpO2 94%   BMI 32.43 kg/m²   Vital signs reviewed with patient.     Gen: Well developed; well nourished; no acute distress; age appropriate appearance   Neuro: No focal deficits noted   Psych: AAOx4; mood and affect are appropriate    A/P:  1. Elevated hemoglobin A1c  A1c is 6.2% and encouragement provided to patient to continue healthy lifestyle and dietary habits to continue to decrease overall BG averages. Recommend repeating A1c in 4 months for ongoing management.   - POCT  A1C    2. Other microscopic colitis  Stable/ patient continues to use Lomotil PRN for related symptom management.  reviewed today and no concerns noted. Pt is well versed in safe use of controlled medication, and benefit of use outweighs risk at this time. New RX sent to pharmacy.   - diphenoxylate-atropine (LOMOTIL) 2.5-0.025 MG Tab; Take 1 Tablet by mouth 4 times a day as needed for Diarrhea for up to 7 days.  Dispense: 28 Tablet; Refill: 3    Time spent: 30 minutes       "

## 2023-12-14 DIAGNOSIS — J30.89 NON-SEASONAL ALLERGIC RHINITIS DUE TO OTHER ALLERGIC TRIGGER: ICD-10-CM

## 2023-12-14 RX ORDER — AZELASTINE 1 MG/ML
1 SPRAY, METERED NASAL 2 TIMES DAILY
Qty: 30 ML | Refills: 3 | Status: SHIPPED | OUTPATIENT
Start: 2023-12-14

## 2023-12-27 DIAGNOSIS — R09.82 POST-NASAL DRIP: ICD-10-CM

## 2023-12-27 DIAGNOSIS — J30.89 NON-SEASONAL ALLERGIC RHINITIS DUE TO OTHER ALLERGIC TRIGGER: ICD-10-CM

## 2024-02-07 DIAGNOSIS — M79.641 RIGHT HAND PAIN: ICD-10-CM

## 2024-02-22 ENCOUNTER — HOSPITAL ENCOUNTER (OUTPATIENT)
Facility: MEDICAL CENTER | Age: 84
End: 2024-02-22
Attending: INTERNAL MEDICINE
Payer: MEDICARE

## 2024-02-22 PROCEDURE — 80061 LIPID PANEL: CPT

## 2024-02-22 PROCEDURE — 80048 BASIC METABOLIC PNL TOTAL CA: CPT

## 2024-02-23 ENCOUNTER — NON-PROVIDER VISIT (OUTPATIENT)
Dept: INTERNAL MEDICINE | Facility: IMAGING CENTER | Age: 84
End: 2024-02-23
Payer: MEDICARE

## 2024-02-23 DIAGNOSIS — I16.0 HYPERTENSIVE URGENCY: ICD-10-CM

## 2024-02-23 DIAGNOSIS — N18.31 STAGE 3A CHRONIC KIDNEY DISEASE: ICD-10-CM

## 2024-02-23 LAB
ANION GAP SERPL CALC-SCNC: 10 MMOL/L (ref 7–16)
BUN SERPL-MCNC: 23 MG/DL (ref 8–22)
CALCIUM SERPL-MCNC: 9.2 MG/DL (ref 8.5–10.5)
CHLORIDE SERPL-SCNC: 104 MMOL/L (ref 96–112)
CHOLEST SERPL-MCNC: 118 MG/DL (ref 100–199)
CO2 SERPL-SCNC: 25 MMOL/L (ref 20–33)
CREAT SERPL-MCNC: 0.63 MG/DL (ref 0.5–1.4)
GFR SERPLBLD CREATININE-BSD FMLA CKD-EPI: 87 ML/MIN/1.73 M 2
GLUCOSE SERPL-MCNC: 130 MG/DL (ref 65–99)
HDLC SERPL-MCNC: 55 MG/DL
LDLC SERPL CALC-MCNC: 42 MG/DL
POTASSIUM SERPL-SCNC: 4.1 MMOL/L (ref 3.6–5.5)
SODIUM SERPL-SCNC: 139 MMOL/L (ref 135–145)
TRIGL SERPL-MCNC: 103 MG/DL (ref 0–149)

## 2024-02-23 PROCEDURE — 99999 PR NO CHARGE: CPT

## 2024-03-01 ENCOUNTER — OFFICE VISIT (OUTPATIENT)
Dept: INTERNAL MEDICINE | Facility: IMAGING CENTER | Age: 84
End: 2024-03-01
Payer: MEDICARE

## 2024-03-01 VITALS
DIASTOLIC BLOOD PRESSURE: 64 MMHG | OXYGEN SATURATION: 97 % | WEIGHT: 188.93 LBS | TEMPERATURE: 98.7 F | SYSTOLIC BLOOD PRESSURE: 122 MMHG | HEART RATE: 59 BPM | HEIGHT: 64 IN | RESPIRATION RATE: 17 BRPM | BODY MASS INDEX: 32.26 KG/M2

## 2024-03-01 DIAGNOSIS — J30.89 NON-SEASONAL ALLERGIC RHINITIS DUE TO OTHER ALLERGIC TRIGGER: ICD-10-CM

## 2024-03-01 DIAGNOSIS — N18.31 STAGE 3A CHRONIC KIDNEY DISEASE: ICD-10-CM

## 2024-03-01 DIAGNOSIS — R73.09 ELEVATED HEMOGLOBIN A1C: ICD-10-CM

## 2024-03-01 LAB
HBA1C MFR BLD: 6 % (ref ?–5.8)
POCT INT CON NEG: NEGATIVE
POCT INT CON POS: POSITIVE

## 2024-03-01 PROCEDURE — 99213 OFFICE O/P EST LOW 20 MIN: CPT | Performed by: FAMILY MEDICINE

## 2024-03-01 PROCEDURE — 3074F SYST BP LT 130 MM HG: CPT | Performed by: FAMILY MEDICINE

## 2024-03-01 PROCEDURE — 3078F DIAST BP <80 MM HG: CPT | Performed by: FAMILY MEDICINE

## 2024-03-01 PROCEDURE — 83036 HEMOGLOBIN GLYCOSYLATED A1C: CPT | Performed by: FAMILY MEDICINE

## 2024-03-01 ASSESSMENT — FIBROSIS 4 INDEX: FIB4 SCORE: 1.49

## 2024-03-01 ASSESSMENT — PATIENT HEALTH QUESTIONNAIRE - PHQ9: CLINICAL INTERPRETATION OF PHQ2 SCORE: 0

## 2024-03-01 NOTE — PROGRESS NOTES
"Chief Complaint   Patient presents with    Follow-Up       HPI:  Patient is a 83 y.o. female established patient who presents today for repeat A1c testing (history of elevated HgA1c). She has been focusing on healthier diet choices and avoiding sweets as able. She continues to work with her specialists and attends PT sessions for right hand pain management (reports excellent improvements).     Patient Active Problem List    Diagnosis Date Noted    Breast cyst, left 11/01/2023    Risk for falls 08/14/2023    Allergic rhinitis 08/14/2023    Chronic pain of left knee 06/21/2023    OAB (overactive bladder) 02/07/2023    Post-nasal drip 02/07/2023    Lumbar foraminal stenosis 02/07/2023    Chronic back pain 02/07/2023    Elevated hemoglobin A1c 02/07/2023    Stage 3a chronic kidney disease (HCC) 07/10/2022    SHIRA (obstructive sleep apnea) 06/21/2021    Epidermal cyst 10/16/2018    Seborrheic keratosis 10/16/2018    Microscopic colitis 09/05/2017    Essential hypertension 12/01/2015    Mixed hyperlipidemia with apolipoprotein E2 variant 04/09/2015    Insulin resistance 04/09/2015    Metabolic syndrome 04/09/2015    Sinus bradycardia 03/16/2015    Eczema 10/14/2014    Osteoporosis, post-menopausal 10/14/2014    Environmental allergies 04/18/2014    Vitamin D deficiency disease 11/13/2013    Dupuytren's contracture of both hands 05/22/2013    Degenerative joint disease        Past medical, surgical, family, and social history was reviewed and updated in Epic chart by me today.     Medications and allergies reviewed and updated in Epic chart by me today.     ROS:  Pertinent positives listed above in HPI. All other systems have been reviewed and are negative.    PE:   /64 (BP Location: Left arm, Patient Position: Sitting, BP Cuff Size: Adult)   Pulse (!) 59   Temp 37.1 °C (98.7 °F) (Temporal)   Resp 17   Ht 1.626 m (5' 4\")   Wt 85.7 kg (188 lb 15 oz)   LMP 01/01/2002   SpO2 97%   BMI 32.43 kg/m²   Vital signs " reviewed with patient.     Gen: Well developed; well nourished; no acute distress; age appropriate appearance   Neuro: No focal deficits noted   Psych: AAOx4; mood and affect are appropriate    A/P:  1. Elevated hemoglobin A1c  Improved/ HgA1c is now 6.0% (6.2% previously 10/31/23) and encouragement provided to patient to continue healthy diet choices and daily mobility as tolerated. Recommend repeat A1c testing in August when patient updates annual labs for Medicare wellness visit.   - POCT  A1C    2. Non-seasonal allergic rhinitis due to other allergic trigger  Ongoing issue managed by Dr. Adams at [facility].     3. Stage 3a chronic kidney disease (HCC)  Stable/ will follow and patient is well versed about staying well hydrated and avoiding nephrotoxins as able.     Time spent: 20 minutes

## 2024-03-04 ENCOUNTER — OFFICE VISIT (OUTPATIENT)
Dept: CARDIOLOGY | Facility: MEDICAL CENTER | Age: 84
End: 2024-03-04
Attending: INTERNAL MEDICINE
Payer: MEDICARE

## 2024-03-04 ENCOUNTER — TELEPHONE (OUTPATIENT)
Dept: CARDIOLOGY | Facility: MEDICAL CENTER | Age: 84
End: 2024-03-04

## 2024-03-04 VITALS
SYSTOLIC BLOOD PRESSURE: 134 MMHG | HEIGHT: 64 IN | WEIGHT: 186 LBS | OXYGEN SATURATION: 95 % | RESPIRATION RATE: 14 BRPM | DIASTOLIC BLOOD PRESSURE: 89 MMHG | BODY MASS INDEX: 31.76 KG/M2 | HEART RATE: 65 BPM

## 2024-03-04 DIAGNOSIS — I10 PRIMARY HYPERTENSION: ICD-10-CM

## 2024-03-04 DIAGNOSIS — E11.65 TYPE 2 DIABETES MELLITUS WITH HYPERGLYCEMIA, WITHOUT LONG-TERM CURRENT USE OF INSULIN (HCC): ICD-10-CM

## 2024-03-04 DIAGNOSIS — E78.5 DYSLIPIDEMIA: ICD-10-CM

## 2024-03-04 DIAGNOSIS — I10 HTN (HYPERTENSION), MALIGNANT: ICD-10-CM

## 2024-03-04 DIAGNOSIS — N18.31 STAGE 3A CHRONIC KIDNEY DISEASE: ICD-10-CM

## 2024-03-04 PROCEDURE — 99214 OFFICE O/P EST MOD 30 MIN: CPT | Performed by: INTERNAL MEDICINE

## 2024-03-04 PROCEDURE — 3075F SYST BP GE 130 - 139MM HG: CPT | Performed by: INTERNAL MEDICINE

## 2024-03-04 PROCEDURE — 3079F DIAST BP 80-89 MM HG: CPT | Performed by: INTERNAL MEDICINE

## 2024-03-04 PROCEDURE — 99212 OFFICE O/P EST SF 10 MIN: CPT | Performed by: INTERNAL MEDICINE

## 2024-03-04 RX ORDER — VALSARTAN 320 MG/1
320 TABLET ORAL DAILY
Qty: 100 TABLET | Refills: 4 | Status: SHIPPED | OUTPATIENT
Start: 2024-03-04

## 2024-03-04 RX ORDER — ROSUVASTATIN CALCIUM 10 MG/1
10 TABLET, COATED ORAL EVERY EVENING
Qty: 100 TABLET | Refills: 4 | Status: SHIPPED | OUTPATIENT
Start: 2024-03-04

## 2024-03-04 RX ORDER — EMPAGLIFLOZIN 10 MG/1
10 TABLET, FILM COATED ORAL DAILY
Qty: 90 TABLET | Refills: 4 | Status: SHIPPED | OUTPATIENT
Start: 2024-03-04 | End: 2024-03-13 | Stop reason: SDUPTHER

## 2024-03-04 RX ORDER — FAMOTIDINE 20 MG/1
TABLET, FILM COATED ORAL
COMMUNITY
Start: 2024-03-01

## 2024-03-04 RX ORDER — NEBIVOLOL 20 MG/1
20 TABLET ORAL DAILY
Qty: 90 TABLET | Refills: 4 | Status: SHIPPED | OUTPATIENT
Start: 2024-03-04

## 2024-03-04 ASSESSMENT — ENCOUNTER SYMPTOMS
MYALGIAS: 0
SENSORY CHANGE: 0
SPEECH CHANGE: 0
DIZZINESS: 0
SHORTNESS OF BREATH: 0
PALPITATIONS: 0
HALLUCINATIONS: 0
HEADACHES: 0
EYE PAIN: 0
PND: 0
VOMITING: 0
CHILLS: 0
CLAUDICATION: 0
EYE DISCHARGE: 0
BLURRED VISION: 0
DEPRESSION: 0
ABDOMINAL PAIN: 0
LOSS OF CONSCIOUSNESS: 0
BRUISES/BLEEDS EASILY: 0
COUGH: 0
DOUBLE VISION: 0
FEVER: 0
WEIGHT LOSS: 0
ORTHOPNEA: 0
BLOOD IN STOOL: 0
NAUSEA: 0
FALLS: 0

## 2024-03-04 ASSESSMENT — FIBROSIS 4 INDEX: FIB4 SCORE: 1.49

## 2024-03-04 NOTE — TELEPHONE ENCOUNTER
Received [employer] PA request via [employer]  for Jardiane 10 mg tablets . (Quantity:90, Day Supply:90)     Insurance: [employer]  Member ID:  [id number]  BIN: [id number]  PCN: [id number]  Group: [id number]     Ran Test claim via [place] & medication Rejects stating prior authorization is required.Prior Authorization for Jardiane 10 mg tablets  (Quantity: 90, Days: 90) has been submitted via Cover My Meds: Key ([id number])    Insurance:BRITTANY    Will follow up in 24-48 business hours.

## 2024-03-04 NOTE — PROGRESS NOTES
Chief Complaint   Patient presents with    Follow-Up     FV Dx: Hypertensive urgency       Subjective     Kacie Rubalcava is a 83 y.o. female who presents today for uncontrolled HTN. Been happening more erratic after her colonoscopy.    Kacie Rubalcava does not have any history of heart attack arrhythmias in the past. she never had transthoracic echocardiogram, cardiac catheterization or ablations procedure in the past. At this time, she denies having chest pain shortness of breath presyncopal syncopal episodes. she is able to exercise with walking for one to 2 miles without having problems of chest pain or shortness of breath. Patient is also able to climb up at least 2 flights of stairs without having symptoms.    I have independently interpreted and reviewed echocardiogram's actual images with patient which showed normal left ventricular systolic function. No wall motion abnormality. No evidence of pulmonary hypertension. No significant valvular disease.    I have personally interpreted EKG today with patient, there is no evidence of acute coronary syndrome, no evidence of prior infarct, normal MT and QT interval, no significant conduction disease. Sinus bradycardia.    I have independently interpreted and reviewed blood tests results with patient in clinic which shows normal LDL level 42, triglycerides 103, GFR of 87 much improved from 52, K of 4.1. hgba1c of 6.        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 at SURGERY SAME DAY [facility] ER ORS    DILATION AND CURETTAGE  3/13/2015    Performed by Alexandria Keenan M.D. at SURGERY SAME DAY [facility] ER ORS    BREAST BIOPSY  11/13/08    Performed by ISRAEL HICKS at SURGERY SAME DAY [facility] ER ORS X3    COLON RESECTION  2000    polyps    APPENDECTOMY      OTHER NEUROLOGICAL SURG      TONSILLECTOMY      US-NEEDLE CORE BX-BREAST PANEL       Family History   Problem Relation Age of Onset    Lung Disease Mother     Cancer Mother         lung cancer    Cancer Paternal Aunt         breast     Social History     Socioeconomic History    Marital status: Single     Spouse name: Not on file    Number of children: Not on file    Years of education: Not on file    Highest education level: Not on file   Occupational History    Not on file   Tobacco Use    Smoking status: Never    Smokeless tobacco: Never   Vaping Use    Vaping Use: Never used   Substance and Sexual Activity    Alcohol use: Yes     Comment: Occ    Drug use: No    Sexual activity: Yes     Partners: Male   Other Topics Concern    Not on file   Social History Narrative    Not on file     Social Determinants of Health     Financial Resource Strain: Not on file   Food Insecurity: Not on file   Transportation Needs: Not on file   Physical Activity: Not on file   Stress: Not on file   Social Connections: Not on file   Intimate Partner Violence: Not on file   Housing Stability: Not on file     Allergies   Allergen Reactions    Food Anaphylaxis and Vomiting     mussels    Sulfa Drugs Anaphylaxis    Augmentin Vomiting    Nifedipine Swelling     Leg edema     Outpatient Encounter Medications as of 3/4/2024   Medication Sig Dispense Refill    famotidine (PEPCID) 20 MG Tab       Nebivolol HCl 20 MG Tab Take 20 mg by mouth every day. 90 Tablet 4    valsartan (DIOVAN) 320 MG tablet Take 1 Tablet by mouth every day. 100 Tablet 4    rosuvastatin (CRESTOR)  "10 MG Tab Take 1 Tablet by mouth every evening. 100 Tablet 4    azelastine (ASTELIN) 137 MCG/SPRAY nasal spray Administer 1 Spray into affected nostril(S) 2 times a day. 30 mL 3    Cholecalciferol (VITAMIN D3) 5000 UNITS CAPS Take 2,000 Units by mouth every day.      Coral Calcium 1000 (390 CA) MG TABS Take 1 Each by mouth every day. 60 Each     [DISCONTINUED] valsartan (DIOVAN) 320 MG tablet Take 1 Tablet by mouth every day. 90 Tablet 4    [DISCONTINUED] nebivolol (BYSTOLIC) 10 MG Tab Take 1 Tablet by mouth every day. 90 Tablet 4    [DISCONTINUED] rosuvastatin (CRESTOR) 10 MG Tab Take 1 Tablet by mouth every evening. 100 Tablet 4     No facility-administered encounter medications on file as of 3/4/2024.     Review of Systems   Constitutional:  Negative for chills, fever, malaise/fatigue and weight loss.   HENT:  Negative for ear discharge, ear pain, hearing loss and nosebleeds.    Eyes:  Negative for blurred vision, double vision, pain and discharge.   Respiratory:  Negative for cough and shortness of breath.    Cardiovascular:  Negative for chest pain, palpitations, orthopnea, claudication, leg swelling and PND.   Gastrointestinal:  Negative for abdominal pain, blood in stool, melena, nausea and vomiting.   Genitourinary:  Negative for dysuria and hematuria.   Musculoskeletal:  Negative for falls, joint pain and myalgias.   Skin:  Negative for itching and rash.   Neurological:  Negative for dizziness, sensory change, speech change, loss of consciousness and headaches.   Endo/Heme/Allergies:  Negative for environmental allergies. Does not bruise/bleed easily.   Psychiatric/Behavioral:  Negative for depression, hallucinations and suicidal ideas.               Objective     /89 (BP Location: Left arm, Patient Position: Sitting, BP Cuff Size: Large adult)   Pulse 65   Resp 14   Ht 1.626 m (5' 4\")   Wt 84.4 kg (186 lb)   LMP 01/01/2002   SpO2 95%   BMI 31.93 kg/m²     Physical Exam  Vitals and nursing note " reviewed.   Constitutional:       General: She is not in acute distress.     Appearance: She is not diaphoretic.   HENT:      Head: Normocephalic and atraumatic.      Right Ear: External ear normal.      Left Ear: External ear normal.      Nose: No congestion or rhinorrhea.   Eyes:      General:         Right eye: No discharge.         Left eye: No discharge.   Neck:      Thyroid: No thyromegaly.      Vascular: No JVD.   Cardiovascular:      Rate and Rhythm: Normal rate and regular rhythm.      Pulses: Normal pulses.   Pulmonary:      Effort: No respiratory distress.   Abdominal:      General: There is no distension.      Tenderness: There is no abdominal tenderness.   Musculoskeletal:         General: No swelling or tenderness.      Right lower leg: No edema.      Left lower leg: No edema.   Skin:     General: Skin is warm and dry.   Neurological:      Mental Status: She is alert and oriented to person, place, and time.      Cranial Nerves: No cranial nerve deficit.   Psychiatric:         Behavior: Behavior normal.                Assessment & Plan     1. HTN (hypertension), malignant  Nebivolol HCl 20 MG Tab      2. Stage 3a chronic kidney disease (HCC)        3. Dyslipidemia  rosuvastatin (CRESTOR) 10 MG Tab      4. Primary hypertension  valsartan (DIOVAN) 320 MG tablet            Medical Decision Making: Today's Assessment/Status/Plan:   Blood pressure is extremely elevated today. No TIA or stroke at this time. Patient is asymptomatic, thus, no need for ER visit nor hospitalization. I suspect there is some desensitization to her current HTN medications as she has been taking them for many years.    Will increase Bystolic to 20 mg daily due to high BP at home on occasions, continue Valsartan 320 mg daily.    Continue Rosuvastatin 10 mg daily.     Patient is not in acute coronary syndrome presentation, is not having any active TIA or stroke symptoms, as not having decompensated heart failure, is not symptomatic in  terms of presyncope pain or syncope. No evidence of significant valvular disease.    I explained to patient that further cardiac testing or procedures will not lower her overall cardiovascular risk for the surgery.  Patient remains low to moderate cardiovascular risk for her intended surgery of eyelids and dental works.      Ita Joseph M.D.

## 2024-03-05 ENCOUNTER — PATIENT MESSAGE (OUTPATIENT)
Dept: CARDIOLOGY | Facility: MEDICAL CENTER | Age: 84
End: 2024-03-05
Payer: MEDICARE

## 2024-03-05 NOTE — TELEPHONE ENCOUNTER
Just spoke to patient she doesn't know where she would like the RX sent. Called insurance and Jardiance doesn't need a PA her co-pay would be $117.50  for 90 days but she is still saying she might want it sent to a [facility] pharmacy. She said she will call me back Friday with what she would like to do.

## 2024-03-11 ENCOUNTER — TELEPHONE (OUTPATIENT)
Dept: CARDIOLOGY | Facility: MEDICAL CENTER | Age: 84
End: 2024-03-11
Payer: MEDICARE

## 2024-03-11 NOTE — TELEPHONE ENCOUNTER
Prior Authorization for   Jardiane 10 mg tablets       has been approved for a quantity of 90 , day supply 90    Prior Authorization reference number: [id number]  Insurance: [employer]   Effective dates: 2/9/25  Copay: $150     Is patient eligible to fill with [facility]? Yes    Next Steps: The patient's copay exceeds $5.00. Proceed with contacting patient to offer financial assistance.

## 2024-03-11 NOTE — TELEPHONE ENCOUNTER
Medication:   Jardiane 10 mg tablets        Type of Insurance: Government funded (Medicare/Medicare Advantage)  Type of Financial assistance requested Foundation  Source: [employer]  Source Phone #: [phone]  Outcome: approved   Effective dates: 2/9/24 until 26/25  Details/Billing Information:   [id number]  PCN: [id number]  GRP: [id number]  ID: [id number]  Max Award Amount: [id number]  Final Copay: $0    After I got her hovelstay to cover her copay. She didn't want to use hovelstay because she is doenst want to fall into the gap. She now wants her RX sent to [facility] online. So she will paying out of pocket for Jardiance.

## 2024-03-11 NOTE — TELEPHONE ENCOUNTER
Lizz Parnell       I spoke to Kacie last week about her co-pay for Jardiance 10 mg. I offered her to apply for a [employer] mikki for her to bring her cost to $0 for Jardiance. She consented, and after applying for her, she was approved for the mikki. When I called to let her know about being approved, she became afraid of going into the GAP (donut hole) since it covers her co-pays but still goes through insurance. She decided that she would rather go through a [facility] pharmacy and pay for the Jardiance out of pocket, rather than use her insurance and mikki due to inevitably going into the donut hole. I explained to her that she does not really have any other medications that would be drastically affected by going into the donut hole, and Jardiance would still be a $0 cost even when in the donut hole due to having the mikki. She still declined, and would like to pay cash prices through the [facility] pharmacy.     Can we please print and fax a new prescription of her Jardiance to [facility] with her PT ID# included on the script?     Phone: [phone]  Fax: [phone]  PT ID# [id number]    Going forward, she is still approved for the mikki until February 2025, if she decides to use it instead at our [facility]. She is aware of this as well.    Thank you,  Marcella Cruz   RX Coordinator   [phone]

## 2024-03-13 ENCOUNTER — TELEPHONE (OUTPATIENT)
Dept: CARDIOLOGY | Facility: MEDICAL CENTER | Age: 84
End: 2024-03-13
Payer: MEDICARE

## 2024-03-13 DIAGNOSIS — E11.65 TYPE 2 DIABETES MELLITUS WITH HYPERGLYCEMIA, WITHOUT LONG-TERM CURRENT USE OF INSULIN (HCC): ICD-10-CM

## 2024-03-13 PROCEDURE — RXMED WILLOW AMBULATORY MEDICATION CHARGE: Performed by: INTERNAL MEDICINE

## 2024-03-13 RX ORDER — EMPAGLIFLOZIN 10 MG/1
10 TABLET, FILM COATED ORAL DAILY
Qty: 90 TABLET | Refills: 4 | Status: SHIPPED | OUTPATIENT
Start: 2024-03-13 | End: 2024-03-13 | Stop reason: SDUPTHER

## 2024-03-13 RX ORDER — EMPAGLIFLOZIN 10 MG/1
10 TABLET, FILM COATED ORAL DAILY
Qty: 90 TABLET | Refills: 3 | Status: SHIPPED | OUTPATIENT
Start: 2024-03-13

## 2024-03-13 NOTE — TELEPHONE ENCOUNTER
Medication:   Jardiane 10 mg tablets        Type of Insurance: Government funded (Medicare/Medicare Advantage)  Type of Financial assistance requested Foundation  Source: [employer]  Source Phone #: [phone]  Outcome: approved   Effective dates: 3/13/24 until  12/31/24  Details/Billing Information:     Final Copay: $200

## 2024-03-13 NOTE — TELEPHONE ENCOUNTER
Lizz Wang     I just got off the phone with Kacie, she would like her Jardiance now sent to [facility] pharmacy. Im sorry. I know we just sent it to [facility] but she changed her mind. Can you please send a new rx with pharmacy note please send to [facility]?    .p

## 2024-03-13 NOTE — TELEPHONE ENCOUNTER
Contact:  Phone number:[phone] (home)    Name of person spoken with and relationship to patient: Kacie   Patient’s Adherence:  How patient is doing on medication: N/A-[facility]    How many missed doses and reason: 0 N/A    Any new medications: No    Any new conditions: No    Any new allergies: No    Any new side effects: No    Any new diagnoses: No    How many doses remainin    Did patient want to speak with pharmacist: No   Delivery:  Delivery date and method: 3/13/24 via     Needs by Date: 3/14/24    Signature required: No     Any additional details for : N/A   Teach Appointment Date:  N/A   Shipping Address:   at [facility]   Medication(name,strength and dose):  Jardiane 10 mg tablets       Copay:  $200   Payment Method:     Supplies:  NA    Additional Information:  NA

## 2024-03-15 ENCOUNTER — PHARMACY VISIT (OUTPATIENT)
Dept: PHARMACY | Facility: MEDICAL CENTER | Age: 84
End: 2024-03-15
Payer: COMMERCIAL

## 2024-03-15 PROCEDURE — RXOTC WILLOW AMBULATORY OTC CHARGE

## 2024-04-03 ENCOUNTER — HOSPITAL ENCOUNTER (OUTPATIENT)
Facility: MEDICAL CENTER | Age: 84
End: 2024-04-03
Attending: OPHTHALMOLOGY
Payer: MEDICARE

## 2024-04-03 ENCOUNTER — NON-PROVIDER VISIT (OUTPATIENT)
Dept: INTERNAL MEDICINE | Facility: IMAGING CENTER | Age: 84
End: 2024-04-03
Payer: MEDICARE

## 2024-04-03 LAB
ANION GAP SERPL CALC-SCNC: 11 MMOL/L (ref 7–16)
BUN SERPL-MCNC: 26 MG/DL (ref 8–22)
CALCIUM SERPL-MCNC: 9.6 MG/DL (ref 8.5–10.5)
CHLORIDE SERPL-SCNC: 106 MMOL/L (ref 96–112)
CO2 SERPL-SCNC: 25 MMOL/L (ref 20–33)
CREAT SERPL-MCNC: 0.97 MG/DL (ref 0.5–1.4)
GFR SERPLBLD CREATININE-BSD FMLA CKD-EPI: 57 ML/MIN/1.73 M 2
GLUCOSE SERPL-MCNC: 93 MG/DL (ref 65–99)
POTASSIUM SERPL-SCNC: 4.4 MMOL/L (ref 3.6–5.5)
SODIUM SERPL-SCNC: 142 MMOL/L (ref 135–145)

## 2024-04-03 PROCEDURE — 99999 PR NO CHARGE: CPT

## 2024-04-03 PROCEDURE — 80048 BASIC METABOLIC PNL TOTAL CA: CPT

## 2024-04-04 ENCOUNTER — NON-PROVIDER VISIT (OUTPATIENT)
Dept: INTERNAL MEDICINE | Facility: IMAGING CENTER | Age: 84
End: 2024-04-04
Payer: MEDICARE

## 2024-04-04 PROCEDURE — 99999 PR NO CHARGE: CPT

## 2024-05-16 DIAGNOSIS — G89.29 CHRONIC PAIN OF LEFT KNEE: ICD-10-CM

## 2024-05-16 DIAGNOSIS — M25.562 CHRONIC PAIN OF LEFT KNEE: ICD-10-CM

## 2024-06-06 PROCEDURE — RXMED WILLOW AMBULATORY MEDICATION CHARGE: Performed by: INTERNAL MEDICINE

## 2024-06-12 ENCOUNTER — PHARMACY VISIT (OUTPATIENT)
Dept: PHARMACY | Facility: MEDICAL CENTER | Age: 84
End: 2024-06-12
Payer: COMMERCIAL

## 2024-06-19 ENCOUNTER — APPOINTMENT (OUTPATIENT)
Dept: RADIOLOGY | Facility: MEDICAL CENTER | Age: 84
End: 2024-06-19
Attending: EMERGENCY MEDICINE
Payer: MEDICARE

## 2024-06-19 ENCOUNTER — HOSPITAL ENCOUNTER (OUTPATIENT)
Facility: MEDICAL CENTER | Age: 84
End: 2024-06-20
Attending: EMERGENCY MEDICINE | Admitting: HOSPITALIST
Payer: MEDICARE

## 2024-06-19 ENCOUNTER — APPOINTMENT (OUTPATIENT)
Dept: CARDIOLOGY | Facility: MEDICAL CENTER | Age: 84
End: 2024-06-19
Attending: HOSPITALIST
Payer: MEDICARE

## 2024-06-19 DIAGNOSIS — I48.0 PAROXYSMAL ATRIAL FIBRILLATION (HCC): ICD-10-CM

## 2024-06-19 DIAGNOSIS — R07.9 CHEST PAIN, UNSPECIFIED TYPE: ICD-10-CM

## 2024-06-19 DIAGNOSIS — I48.91 ATRIAL FIBRILLATION WITH RAPID VENTRICULAR RESPONSE (HCC): ICD-10-CM

## 2024-06-19 DIAGNOSIS — I10 HYPERTENSION, UNSPECIFIED TYPE: ICD-10-CM

## 2024-06-19 PROBLEM — E66.09 OBESITY DUE TO EXCESS CALORIES WITH SERIOUS COMORBIDITY: Status: ACTIVE | Noted: 2024-06-19

## 2024-06-19 PROBLEM — R00.1 SYMPTOMATIC BRADYCARDIA: Status: ACTIVE | Noted: 2024-06-19

## 2024-06-19 PROBLEM — I16.0 HYPERTENSIVE URGENCY: Status: ACTIVE | Noted: 2024-06-19

## 2024-06-19 LAB
ALBUMIN SERPL BCP-MCNC: 3.7 G/DL (ref 3.2–4.9)
ALBUMIN/GLOB SERPL: 1.3 G/DL
ALP SERPL-CCNC: 75 U/L (ref 30–99)
ALT SERPL-CCNC: 24 U/L (ref 2–50)
ANION GAP SERPL CALC-SCNC: 11 MMOL/L (ref 7–16)
AST SERPL-CCNC: 22 U/L (ref 12–45)
BASOPHILS # BLD AUTO: 0.5 % (ref 0–1.8)
BASOPHILS # BLD: 0.03 K/UL (ref 0–0.12)
BILIRUB SERPL-MCNC: 0.4 MG/DL (ref 0.1–1.5)
BUN SERPL-MCNC: 22 MG/DL (ref 8–22)
CALCIUM ALBUM COR SERPL-MCNC: 9.3 MG/DL (ref 8.5–10.5)
CALCIUM SERPL-MCNC: 9.1 MG/DL (ref 8.5–10.5)
CHLORIDE SERPL-SCNC: 101 MMOL/L (ref 96–112)
CO2 SERPL-SCNC: 24 MMOL/L (ref 20–33)
CREAT SERPL-MCNC: 1.08 MG/DL (ref 0.5–1.4)
EKG IMPRESSION: NORMAL
EOSINOPHIL # BLD AUTO: 0.17 K/UL (ref 0–0.51)
EOSINOPHIL NFR BLD: 2.6 % (ref 0–6.9)
ERYTHROCYTE [DISTWIDTH] IN BLOOD BY AUTOMATED COUNT: 46.8 FL (ref 35.9–50)
GFR SERPLBLD CREATININE-BSD FMLA CKD-EPI: 50 ML/MIN/1.73 M 2
GLOBULIN SER CALC-MCNC: 2.9 G/DL (ref 1.9–3.5)
GLUCOSE BLD STRIP.AUTO-MCNC: 121 MG/DL (ref 65–99)
GLUCOSE BLD STRIP.AUTO-MCNC: 162 MG/DL (ref 65–99)
GLUCOSE SERPL-MCNC: 124 MG/DL (ref 65–99)
HCT VFR BLD AUTO: 46.2 % (ref 37–47)
HGB BLD-MCNC: 15.4 G/DL (ref 12–16)
IMM GRANULOCYTES # BLD AUTO: 0.02 K/UL (ref 0–0.11)
IMM GRANULOCYTES NFR BLD AUTO: 0.3 % (ref 0–0.9)
LYMPHOCYTES # BLD AUTO: 2.3 K/UL (ref 1–4.8)
LYMPHOCYTES NFR BLD: 35.6 % (ref 22–41)
MCH RBC QN AUTO: 30.6 PG (ref 27–33)
MCHC RBC AUTO-ENTMCNC: 33.3 G/DL (ref 32.2–35.5)
MCV RBC AUTO: 91.8 FL (ref 81.4–97.8)
MONOCYTES # BLD AUTO: 0.52 K/UL (ref 0–0.85)
MONOCYTES NFR BLD AUTO: 8 % (ref 0–13.4)
NEUTROPHILS # BLD AUTO: 3.42 K/UL (ref 1.82–7.42)
NEUTROPHILS NFR BLD: 53 % (ref 44–72)
NRBC # BLD AUTO: 0 K/UL
NRBC BLD-RTO: 0 /100 WBC (ref 0–0.2)
PLATELET # BLD AUTO: 272 K/UL (ref 164–446)
PMV BLD AUTO: 9.6 FL (ref 9–12.9)
POTASSIUM SERPL-SCNC: 4 MMOL/L (ref 3.6–5.5)
PROT SERPL-MCNC: 6.6 G/DL (ref 6–8.2)
RBC # BLD AUTO: 5.03 M/UL (ref 4.2–5.4)
SODIUM SERPL-SCNC: 136 MMOL/L (ref 135–145)
T4 FREE SERPL-MCNC: 1.48 NG/DL (ref 0.93–1.7)
TROPONIN T SERPL-MCNC: 16 NG/L (ref 6–19)
TROPONIN T SERPL-MCNC: 18 NG/L (ref 6–19)
TROPONIN T SERPL-MCNC: 25 NG/L (ref 6–19)
TSH SERPL DL<=0.005 MIU/L-ACNC: 1.6 UIU/ML (ref 0.38–5.33)
WBC # BLD AUTO: 6.5 K/UL (ref 4.8–10.8)

## 2024-06-19 PROCEDURE — 99223 1ST HOSP IP/OBS HIGH 75: CPT | Performed by: HOSPITALIST

## 2024-06-19 PROCEDURE — 93005 ELECTROCARDIOGRAM TRACING: CPT | Performed by: EMERGENCY MEDICINE

## 2024-06-19 PROCEDURE — 71045 X-RAY EXAM CHEST 1 VIEW: CPT

## 2024-06-19 PROCEDURE — 85027 COMPLETE CBC AUTOMATED: CPT

## 2024-06-19 PROCEDURE — 84484 ASSAY OF TROPONIN QUANT: CPT | Mod: 91

## 2024-06-19 PROCEDURE — 84443 ASSAY THYROID STIM HORMONE: CPT

## 2024-06-19 PROCEDURE — 96372 THER/PROPH/DIAG INJ SC/IM: CPT

## 2024-06-19 PROCEDURE — A9270 NON-COVERED ITEM OR SERVICE: HCPCS | Performed by: HOSPITALIST

## 2024-06-19 PROCEDURE — 93005 ELECTROCARDIOGRAM TRACING: CPT | Performed by: HOSPITALIST

## 2024-06-19 PROCEDURE — 99285 EMERGENCY DEPT VISIT HI MDM: CPT

## 2024-06-19 PROCEDURE — G0378 HOSPITAL OBSERVATION PER HR: HCPCS

## 2024-06-19 PROCEDURE — 93306 TTE W/DOPPLER COMPLETE: CPT

## 2024-06-19 PROCEDURE — 80053 COMPREHEN METABOLIC PANEL: CPT | Mod: 91

## 2024-06-19 PROCEDURE — 85025 COMPLETE CBC W/AUTO DIFF WBC: CPT

## 2024-06-19 PROCEDURE — 93005 ELECTROCARDIOGRAM TRACING: CPT

## 2024-06-19 PROCEDURE — 700102 HCHG RX REV CODE 250 W/ 637 OVERRIDE(OP): Performed by: HOSPITALIST

## 2024-06-19 PROCEDURE — 84439 ASSAY OF FREE THYROXINE: CPT

## 2024-06-19 PROCEDURE — 82962 GLUCOSE BLOOD TEST: CPT | Mod: 91

## 2024-06-19 PROCEDURE — 36415 COLL VENOUS BLD VENIPUNCTURE: CPT

## 2024-06-19 RX ORDER — ENOXAPARIN SODIUM 100 MG/ML
40 INJECTION SUBCUTANEOUS DAILY
Status: DISCONTINUED | OUTPATIENT
Start: 2024-06-19 | End: 2024-06-19

## 2024-06-19 RX ORDER — ONDANSETRON 4 MG/1
4 TABLET, ORALLY DISINTEGRATING ORAL EVERY 4 HOURS PRN
Status: DISCONTINUED | OUTPATIENT
Start: 2024-06-19 | End: 2024-06-20 | Stop reason: HOSPADM

## 2024-06-19 RX ORDER — VALSARTAN 80 MG/1
320 TABLET ORAL EVERY EVENING
Status: DISCONTINUED | OUTPATIENT
Start: 2024-06-19 | End: 2024-06-20 | Stop reason: HOSPADM

## 2024-06-19 RX ORDER — DEXTROSE MONOHYDRATE 25 G/50ML
25 INJECTION, SOLUTION INTRAVENOUS
Status: DISCONTINUED | OUTPATIENT
Start: 2024-06-19 | End: 2024-06-20 | Stop reason: HOSPADM

## 2024-06-19 RX ORDER — ONDANSETRON 2 MG/ML
4 INJECTION INTRAMUSCULAR; INTRAVENOUS EVERY 4 HOURS PRN
Status: DISCONTINUED | OUTPATIENT
Start: 2024-06-19 | End: 2024-06-20 | Stop reason: HOSPADM

## 2024-06-19 RX ORDER — HYDRALAZINE HYDROCHLORIDE 20 MG/ML
10 INJECTION INTRAMUSCULAR; INTRAVENOUS EVERY 4 HOURS PRN
Status: DISCONTINUED | OUTPATIENT
Start: 2024-06-19 | End: 2024-06-20 | Stop reason: HOSPADM

## 2024-06-19 RX ORDER — ROSUVASTATIN CALCIUM 10 MG/1
10 TABLET, COATED ORAL EVERY EVENING
Status: DISCONTINUED | OUTPATIENT
Start: 2024-06-19 | End: 2024-06-20 | Stop reason: HOSPADM

## 2024-06-19 RX ADMIN — APIXABAN 5 MG: 5 TABLET, FILM COATED ORAL at 17:03

## 2024-06-19 RX ADMIN — VALSARTAN 320 MG: 80 TABLET ORAL at 17:03

## 2024-06-19 RX ADMIN — ROSUVASTATIN 10 MG: 10 TABLET, FILM COATED ORAL at 17:03

## 2024-06-19 RX ADMIN — INSULIN HUMAN 2 UNITS: 100 INJECTION, SOLUTION PARENTERAL at 17:07

## 2024-06-19 SDOH — ECONOMIC STABILITY: TRANSPORTATION INSECURITY
IN THE PAST 12 MONTHS, HAS THE LACK OF TRANSPORTATION KEPT YOU FROM MEDICAL APPOINTMENTS OR FROM GETTING MEDICATIONS?: NO

## 2024-06-19 SDOH — ECONOMIC STABILITY: TRANSPORTATION INSECURITY
IN THE PAST 12 MONTHS, HAS LACK OF RELIABLE TRANSPORTATION KEPT YOU FROM MEDICAL APPOINTMENTS, MEETINGS, WORK OR FROM GETTING THINGS NEEDED FOR DAILY LIVING?: NO

## 2024-06-19 ASSESSMENT — PATIENT HEALTH QUESTIONNAIRE - PHQ9
SUM OF ALL RESPONSES TO PHQ9 QUESTIONS 1 AND 2: 0
1. LITTLE INTEREST OR PLEASURE IN DOING THINGS: NOT AT ALL
2. FEELING DOWN, DEPRESSED, IRRITABLE, OR HOPELESS: NOT AT ALL

## 2024-06-19 ASSESSMENT — SOCIAL DETERMINANTS OF HEALTH (SDOH)
WITHIN THE PAST 12 MONTHS, YOU WORRIED THAT YOUR FOOD WOULD RUN OUT BEFORE YOU GOT THE MONEY TO BUY MORE: NEVER TRUE
IN THE PAST 12 MONTHS, HAS THE ELECTRIC, GAS, OIL, OR WATER COMPANY THREATENED TO SHUT OFF SERVICE IN YOUR HOME?: NO
WITHIN THE LAST YEAR, HAVE TO BEEN RAPED OR FORCED TO HAVE ANY KIND OF SEXUAL ACTIVITY BY YOUR PARTNER OR EX-PARTNER?: NO
WITHIN THE LAST YEAR, HAVE YOU BEEN AFRAID OF YOUR PARTNER OR EX-PARTNER?: NO
WITHIN THE LAST YEAR, HAVE YOU BEEN KICKED, HIT, SLAPPED, OR OTHERWISE PHYSICALLY HURT BY YOUR PARTNER OR EX-PARTNER?: NO
WITHIN THE LAST YEAR, HAVE YOU BEEN HUMILIATED OR EMOTIONALLY ABUSED IN OTHER WAYS BY YOUR PARTNER OR EX-PARTNER?: NO
WITHIN THE PAST 12 MONTHS, THE FOOD YOU BOUGHT JUST DIDN'T LAST AND YOU DIDN'T HAVE MONEY TO GET MORE: NEVER TRUE

## 2024-06-19 ASSESSMENT — COGNITIVE AND FUNCTIONAL STATUS - GENERAL
SUGGESTED CMS G CODE MODIFIER MOBILITY: CJ
MOVING FROM LYING ON BACK TO SITTING ON SIDE OF FLAT BED: A LITTLE
DAILY ACTIVITIY SCORE: 24
CLIMB 3 TO 5 STEPS WITH RAILING: A LITTLE
STANDING UP FROM CHAIR USING ARMS: A LITTLE
SUGGESTED CMS G CODE MODIFIER DAILY ACTIVITY: CH
MOBILITY SCORE: 21

## 2024-06-19 ASSESSMENT — LIFESTYLE VARIABLES
TOTAL SCORE: 0
HOW MANY TIMES IN THE PAST YEAR HAVE YOU HAD 5 OR MORE DRINKS IN A DAY: 0
EVER HAD A DRINK FIRST THING IN THE MORNING TO STEADY YOUR NERVES TO GET RID OF A HANGOVER: NO
AVERAGE NUMBER OF DAYS PER WEEK YOU HAVE A DRINK CONTAINING ALCOHOL: 0
HAVE PEOPLE ANNOYED YOU BY CRITICIZING YOUR DRINKING: NO
HAVE YOU EVER FELT YOU SHOULD CUT DOWN ON YOUR DRINKING: NO
ON A TYPICAL DAY WHEN YOU DRINK ALCOHOL HOW MANY DRINKS DO YOU HAVE: 0
CONSUMPTION TOTAL: NEGATIVE
ALCOHOL_USE: NO
TOTAL SCORE: 0
EVER FELT BAD OR GUILTY ABOUT YOUR DRINKING: NO
DOES PATIENT WANT TO STOP DRINKING: NO
TOTAL SCORE: 0

## 2024-06-19 ASSESSMENT — FIBROSIS 4 INDEX
FIB4 SCORE: 1.49
FIB4 SCORE: 1.39

## 2024-06-19 ASSESSMENT — PAIN DESCRIPTION - PAIN TYPE
TYPE: ACUTE PAIN
TYPE: ACUTE PAIN

## 2024-06-19 ASSESSMENT — CHA2DS2 SCORE
AGE 75 OR GREATER: YES
HYPERTENSION: YES
CHA2DS2 VASC SCORE: 6
SEX: FEMALE
DIABETES: NO
SEX: FEMALE
CHA2D2S VASC SCORE: 4
DIABETES: YES
HYPERTENSION: YES
VASCULAR DISEASE: NO
PRIOR STROKE OR TIA OR THROMBOEMBOLISM: NO
AGE 65 TO 74: NO
PRIOR STROKE OR TIA OR THROMBOEMBOLISM: NO
CHF OR LEFT VENTRICULAR DYSFUNCTION: NO
VASCULAR DISEASE: YES
AGE IN YEARS: 75+
CHF OR LEFT VENTRICULAR DYSFUNCTION: NO

## 2024-06-19 ASSESSMENT — ENCOUNTER SYMPTOMS
SHORTNESS OF BREATH: 1
MUSCULOSKELETAL NEGATIVE: 1
CHILLS: 0
NAUSEA: 1
FEVER: 0
PSYCHIATRIC NEGATIVE: 1
DIZZINESS: 1

## 2024-06-19 ASSESSMENT — HEART SCORE
HISTORY: MODERATELY SUSPICIOUS
ECG: NORMAL
RISK FACTORS: 1-2 RISK FACTORS
AGE: 65+
TROPONIN: LESS THAN OR EQUAL TO NORMAL LIMIT
HEART SCORE: 4

## 2024-06-19 NOTE — CARE PLAN
The patient is Stable - Low risk of patient condition declining or worsening    Shift Goals  Clinical Goals: Monitor heart rhythm  Patient Goals: Rest  Family Goals: NA    Progress made toward(s) clinical / shift goals:      Problem: Knowledge Deficit - Standard  Goal: Patient and family/care givers will demonstrate understanding of plan of care, disease process/condition, diagnostic tests and medications  Description: Target End Date:  1-3 days or as soon as patient condition allows    Document in Patient Education    1.  Patient and family/caregiver oriented to unit, equipment, visitation policy and means for communicating concern  2.  Complete/review Learning Assessment  3.  Assess knowledge level of disease process/condition, treatment plan, diagnostic tests and medications  4.  Explain disease process/condition, treatment plan, diagnostic tests and medications  Outcome: Progressing

## 2024-06-19 NOTE — PROGRESS NOTES
Pt arrived to T811. A&Ox4, RA, denies any pain or chest pain. Sinus yolanda on the monitor. Pt resting comfortably, bed in low and locked position, call light within reach, will continue to monitor.

## 2024-06-19 NOTE — ASSESSMENT & PLAN NOTE
The ASCVD Risk score ([place] WAGONER, et al., 2019) failed to calculate for the following reasons:    The 2019 ASCVD risk score is only valid for ages 40 to 79      Could be related to possible ACS versus the rapid heart rhythm    Check serial troponins    Check echocardiogram    Persantine stress test

## 2024-06-19 NOTE — ED PROVIDER NOTES
ED Provider Note    CHIEF COMPLAINT  Chief Complaint   Patient presents with    Chest Pain     Starting this AM, pressure upper chest radiating to BUE, pt was found to be in a rapid A-fib rate 120-160, denies hx of A-fib, pt spontaneously converted to sinus yolanda en route and states chest pressure ended at that time, denies any cardiac hx        EXTERNAL RECORDS REVIEWED  Outpatient Notes cardiology appointment March 2024, evaluated for hypertension, dyslipidemia    HPI/ROS  LIMITATION TO HISTORY   Select: : None  OUTSIDE HISTORIAN(S):  Significant other patient's  at bedside for discussion history and symptoms    Kacie Rubalcava is a 83 y.o. female who presents after a 2-hour episode of anterior chest pressure rating to both arms.  She noticed that soon after awakening today.  Paramedics were called, finding the patient in rapid atrial fibrillation.  During the transport to the hospital, rhythm converted back to sinus.  She denies history of heart attack.  She is previously seen a cardiologist after she became hypertensive following surgery.  She denies history of MI.  No history of pulmonary embolism.  No new leg swelling.  No exercise intolerance.  During the chest pressure she felt lightheaded and sweaty.    PAST MEDICAL HISTORY   has a past medical history of Anxiety, Arthritis (03/12/15), Chickenpox, Degenerative joint disease, Epidermal cyst (10/16/2018), Romanian measles, High cholesterol, HTN (hypertension), benign, Hyperlipemia, Hypertension, Microscopic colitis (2010), Neck pain on left side (5/31/2017), Osteoporosis, Other specified symptom associated with female genital organs, Seborrheic keratosis (10/16/2018), and Snoring.    SURGICAL HISTORY   has a past surgical history that includes tonsillectomy; us-needle core bx-breast panel; breast biopsy (11/13/08); hysteroscopy with video diagnostic (3/13/2015); dilation and curettage (3/13/2015); appendectomy; colon resection (2000); and other  "neurological surg.    FAMILY HISTORY  Family History   Problem Relation Age of Onset    Lung Disease Mother     Cancer Mother         lung cancer    Cancer Paternal Aunt         breast       SOCIAL HISTORY  Social History     Tobacco Use    Smoking status: Never    Smokeless tobacco: Never   Vaping Use    Vaping status: Never Used   Substance and Sexual Activity    Alcohol use: Yes     Comment: Occ    Drug use: No    Sexual activity: Yes     Partners: Male       CURRENT MEDICATIONS  Home Medications       Reviewed by Keith Johnson R.N. (Registered Nurse) on 06/19/24 at 1018  Med List Status: <None>     Medication Last Dose Status   azelastine (ASTELIN) 137 MCG/SPRAY nasal spray  Active   Cholecalciferol (VITAMIN D3) 5000 UNITS CAPS  Active   Coral Calcium 1000 (390 CA) MG TABS  Active   Empagliflozin (JARDIANCE) 10 MG Tab tablet  Active   famotidine (PEPCID) 20 MG Tab  Active   Fluticasone Propionate (XHANCE) 93 MCG/ACT Exhaler Suspension  Active   Nebivolol HCl 20 MG Tab  Active   rosuvastatin (CRESTOR) 10 MG Tab  Active   valsartan (DIOVAN) 320 MG tablet  Active                  Audit from Redirected Encounters    **Home medications have not yet been reviewed for this encounter**         ALLERGIES  Allergies   Allergen Reactions    Food Anaphylaxis and Vomiting     mussels    Sulfa Drugs Anaphylaxis    Augmentin Vomiting    Nifedipine Swelling     Leg edema       PHYSICAL EXAM  VITAL SIGNS: BP (!) 182/80   Pulse (!) 49   Temp 36.6 °C (97.8 °F) (Temporal)   Resp 18   Ht 1.626 m (5' 4\")   Wt 83.9 kg (185 lb)   LMP 01/01/2002   SpO2 96%   BMI 31.76 kg/m²    Cardiac: Bradycardic rate, regular rhythm, no murmur  Respiratory: Clear lung sounds  GI: Abdomen is soft and nontender, no guarding  Skin: Symmetric lower extremity mild edema.  Neurologic: Strength and sensation normal, speech clear.  Eyes: Pupils are equal.  No nystagmus  Musculoskeletal: No chest wall tenderness  Psychiatric: Normal " mood    EKG/LABS  Results for orders placed or performed during the hospital encounter of 06/19/24   CBC with Differential   Result Value Ref Range    WBC 6.5 4.8 - 10.8 K/uL    RBC 5.03 4.20 - 5.40 M/uL    Hemoglobin 15.4 12.0 - 16.0 g/dL    Hematocrit 46.2 37.0 - 47.0 %    MCV 91.8 81.4 - 97.8 fL    MCH 30.6 27.0 - 33.0 pg    MCHC 33.3 32.2 - 35.5 g/dL    RDW 46.8 35.9 - 50.0 fL    Platelet Count 272 164 - 446 K/uL    MPV 9.6 9.0 - 12.9 fL    Neutrophils-Polys 53.00 44.00 - 72.00 %    Lymphocytes 35.60 22.00 - 41.00 %    Monocytes 8.00 0.00 - 13.40 %    Eosinophils 2.60 0.00 - 6.90 %    Basophils 0.50 0.00 - 1.80 %    Immature Granulocytes 0.30 0.00 - 0.90 %    Nucleated RBC 0.00 0.00 - 0.20 /100 WBC    Neutrophils (Absolute) 3.42 1.82 - 7.42 K/uL    Lymphs (Absolute) 2.30 1.00 - 4.80 K/uL    Monos (Absolute) 0.52 0.00 - 0.85 K/uL    Eos (Absolute) 0.17 0.00 - 0.51 K/uL    Baso (Absolute) 0.03 0.00 - 0.12 K/uL    Immature Granulocytes (abs) 0.02 0.00 - 0.11 K/uL    NRBC (Absolute) 0.00 K/uL   Complete Metabolic Panel (CMP)   Result Value Ref Range    Sodium 136 135 - 145 mmol/L    Potassium 4.0 3.6 - 5.5 mmol/L    Chloride 101 96 - 112 mmol/L    Co2 24 20 - 33 mmol/L    Anion Gap 11.0 7.0 - 16.0    Glucose 124 (H) 65 - 99 mg/dL    Bun 22 8 - 22 mg/dL    Creatinine 1.08 0.50 - 1.40 mg/dL    Calcium 9.1 8.5 - 10.5 mg/dL    Correct Calcium 9.3 8.5 - 10.5 mg/dL    AST(SGOT) 22 12 - 45 U/L    ALT(SGPT) 24 2 - 50 U/L    Alkaline Phosphatase 75 30 - 99 U/L    Total Bilirubin 0.4 0.1 - 1.5 mg/dL    Albumin 3.7 3.2 - 4.9 g/dL    Total Protein 6.6 6.0 - 8.2 g/dL    Globulin 2.9 1.9 - 3.5 g/dL    A-G Ratio 1.3 g/dL   Troponins NOW   Result Value Ref Range    Troponin T 16 6 - 19 ng/L   ESTIMATED GFR   Result Value Ref Range    GFR (CKD-EPI) 50 (A) >60 mL/min/1.73 m 2   EKG   Result Value Ref Range    Report       [facility] Emergency Dept[facility]    Test Date:  2024-06-19  Pt Name:    LA NENA SOLO                 Department: ER  MRN:        [id number]                      Room:       RD 04  Gender:     Female                       Technician: [id number]  :        [id number]                   Requested By:ER TRIAGE PROTOCOL  Order #:    [id number]                    Reading MD: PATRICIA YE MD    Measurements  Intervals                                Axis  Rate:       49                           P:          20  SD:         165                          QRS:        -35  QRSD:       96                           T:          13  QT:         473  QTc:        428    Interpretive Statements  Sinus bradycardia  Probable left atrial enlargement  Abnormal R-wave progression, late transition  Left ventricular hypertrophy  Inferior infarct, old  Compared to ECG 2022 19:56:13  no ischemic changed  Sinus rhythm no longer present  Electronically Signed On 2024 11:15:25 PDT by PATRICIA YE MD         I have independently interpreted this EKG    RADIOLOGY/PROCEDURES   I have independently interpreted the diagnostic imaging associated with this visit and am waiting the final reading from the radiologist.   My preliminary interpretation is as follows: Chest x-ray negative for pneumonia    Radiologist interpretation:  DX-CHEST-PORTABLE (1 VIEW)   Final Result      No acute cardiac or pulmonary abnormalities are identified.          COURSE & MEDICAL DECISION MAKING    ASSESSMENT, COURSE AND PLAN  Care Narrative: Patient with 2-hour episode of chest pressure radiating to the arms, differential including ischemia cardiac, chest wall pain, esophageal spasm, anxiety, arrhythmia.  Rhythm strips from the paramedics demonstrate rapid atrial fibrillation, this converted spontaneously to sinus bradycardia when she has maintained.  She is not mildly hypertensive in the ER, bradycardic states she no longer feels dizzy and her diaphoresis has resolved.  Initial troponin is negative, follow-up 2-hour troponin is pending.  Given history,  risk factors, evidence of new onset atrial fibrillation, she will be hospitalized for ongoing evaluation and treatment.  Thyroid function tests have been added and are pending.  No fever, no evidence of pneumonia.  Chest Pain: HEART Score: 4          ADDITIONAL PROBLEMS MANAGED  Hypertension: Emergent treatment was not indicated, current blood pressure 183/79    DISPOSITION AND DISCUSSIONS  I have discussed management of the patient with the following physicians and YASH's: Admitting hospitalist service      Decision tools and prescription drugs considered including, but not limited to:  [id number] 2 score is 4 .    FINAL DIAGNOSIS  1. Chest pain, unspecified type    2. Atrial fibrillation with rapid ventricular response (HCC)    3. Hypertension, unspecified type           Electronically signed by: Jose Miguel Church M.D., 6/19/2024 10:29 AM

## 2024-06-19 NOTE — ED NOTES
Med Rec complete per patient   Allergies reviewed  Antibiotics in the past 30 days:no  Anticoagulant in past 14 days:no  Pharmacy patient utilizes:[facility] in [facility]    Pt unable to verify if she took Jardiance this morning

## 2024-06-19 NOTE — PROGRESS NOTES
4 Eyes Skin Assessment Completed by Mary Kay RN and BRITTANEY Danielson.    Head WDL  Ears WDL  Nose WDL  Mouth WDL  Neck WDL  Breast/Chest WDL  Shoulder Blades WDL  Spine WDL  (R) Arm/Elbow/Hand WDL  (L) Arm/Elbow/Hand WDL  Abdomen WDL  Groin WDL  Scrotum/Coccyx/Buttocks WDL  (R) Leg WDL  (L) Leg WDL  (R) Heel/Foot/Toe WDL  (L) Heel/Foot/Toe WDL          Devices In Places Tele Box and Pulse Ox      Interventions In Place Pillows    Possible Skin Injury No    Pictures Uploaded Into Epic N/A  Wound Consult Placed N/A  RN Wound Prevention Protocol Ordered No

## 2024-06-19 NOTE — ED TRIAGE NOTES
"Chief Complaint   Patient presents with    Chest Pain     Starting this AM, pressure upper chest radiating to BUE, pt was found to be in a rapid A-fib rate 120-160, denies hx of A-fib, pt spontaneously converted to sinus yolanda en route and states chest pressure ended at that time, denies any cardiac hx      Pt BIB [facility] for above complaints, VSS on RA, GCS 15, NAD.    Pt was mildly hypotensive en route but arrives hypertensive with an hx of HTN.    BP (!) 182/80   Pulse (!) 49   Temp 36.6 °C (97.8 °F) (Temporal)   Resp 18   Ht 1.626 m (5' 4\")   Wt 83.9 kg (185 lb)   LMP 01/01/2002   SpO2 96%   BMI 31.76 kg/m²     "

## 2024-06-19 NOTE — DISCHARGE PLANNING
Pt rolled back to Observation status per attending MD determination, Dr. Rockwell, and UR Committee MD secondary review, Dr. Valdez. Condition Code 44 completed.

## 2024-06-19 NOTE — H&P
Hospital Medicine History & Physical Note    Date of Service  6/19/2024    Primary Care Physician  Ally Barroso M.D.    Consultants      Code Status  Full Code    Chief Complaint  Chief Complaint   Patient presents with    Chest Pain     Starting this AM, pressure upper chest radiating to BUE, pt was found to be in a rapid A-fib rate 120-160, denies hx of A-fib, pt spontaneously converted to sinus yolanda en route and states chest pressure ended at that time, denies any cardiac hx        History of Presenting Illness  Kacie Rubalcava is a 83 y.o. female who presented 6/19/2024 with past medical history of diabetes mellitus type II, hypertension, and obesity arrived at the [facility] emergency department via EMS after having chest pressure ongoing for the last 2 hours.      It was a pressure-like pain that radiates to both arms.  She was nauseous, she was short of breath .she was diaphoretic.    When EMS arrived .patient was in A-fib RVR with a rate of 120-160.  She is spontaneously converted to sinus bradycardia    She arrived to the emergency department her blood pressure was elevated and remained elevated.    Also noted to have bradycardia.  In the mid 40s consistently  .      I discussed the plan of care with patient.    Review of Systems  Review of Systems   Constitutional:  Positive for malaise/fatigue. Negative for chills and fever.   HENT: Negative.     Respiratory:  Positive for shortness of breath.    Cardiovascular:  Positive for chest pain.   Gastrointestinal:  Positive for nausea.   Genitourinary: Negative.    Musculoskeletal: Negative.    Skin: Negative.    Neurological:  Positive for dizziness.   Psychiatric/Behavioral: Negative.         Past Medical History   has a past medical history of Anxiety, Arthritis (03/12/15), Chickenpox, Degenerative joint disease, Epidermal cyst (10/16/2018), Uz[place] measles, High cholesterol, HTN (hypertension), benign, Hyperlipemia,  Hypertension, Microscopic colitis (2010), Neck pain on left side (5/31/2017), Osteoporosis, Other specified symptom associated with female genital organs, Seborrheic keratosis (10/16/2018), and Snoring.    Surgical History   has a past surgical history that includes tonsillectomy; us-needle core bx-breast panel; breast biopsy (11/13/08); hysteroscopy with video diagnostic (3/13/2015); dilation and curettage (3/13/2015); appendectomy; colon resection (2000); and other neurological surg.     Family History  family history includes Cancer in her mother and paternal aunt; Lung Disease in her mother.   Family history reviewed with patient. There is no family history that is pertinent to the chief complaint.     Social History   reports that she has never smoked. She has never used smokeless tobacco. She reports current alcohol use. She reports that she does not use drugs.    Allergies  Allergies   Allergen Reactions    Food Anaphylaxis and Vomiting     mussels    Sulfa Drugs Anaphylaxis    Augmentin Vomiting    Nifedipine Swelling     Leg edema       Medications  Prior to Admission Medications   Prescriptions Last Dose Informant Patient Reported? Taking?   CORAL CALCIUM PO 6/18/2024 at PM Patient Yes Yes   Sig: Take 1 Tablet by mouth every evening.   Empagliflozin (JARDIANCE) 10 MG Tab tablet UNK at UNK Patient No No   Sig: Take 1 Tablet by mouth every day.   Fluticasone Propionate (XHANCE) 93 MCG/ACT Exhaler Suspension NOT TAKING at NOT TAKING Patient No No   Sig: Instill 1 spray in each nostril twice daily   Nebivolol HCl 20 MG Tab 6/18/2024 at PM Patient No Yes   Sig: Take 20 mg by mouth every day.   Patient taking differently: Take 20 mg by mouth every evening.   Polyethyl Glycol-Propyl Glycol (SYSTANE OP) 6/19/2024 at 0800 Patient Yes Yes   Sig: Administer 1 Drop into both eyes every 2 hours. For dry eyes   rosuvastatin (CRESTOR) 10 MG Tab 6/18/2024 at PM Patient No Yes   Sig: Take 1 Tablet by mouth every evening.    valsartan (DIOVAN) 320 MG tablet 6/18/2024 at PM Patient No Yes   Sig: Take 1 Tablet by mouth every day.      Facility-Administered Medications: None       Physical Exam  Temp:  [36.6 °C (97.8 °F)] 36.6 °C (97.8 °F)  Pulse:  [47-49] 47  Resp:  [13-18] 13  BP: (180-183)/(75-80) 183/79  SpO2:  [96 %] 96 %  Blood Pressure : (!) 183/79   Temperature: 36.6 °C (97.8 °F)   Pulse: (!) 47   Respiration: 13   Pulse Oximetry: 96 %       Physical Exam  Constitutional:       General: She is not in acute distress.     Appearance: She is obese. She is not ill-appearing, toxic-appearing or diaphoretic.   Eyes:      Extraocular Movements: Extraocular movements intact.      Pupils: Pupils are equal, round, and reactive to light.   Cardiovascular:      Rate and Rhythm: Regular rhythm. Bradycardia present.      Heart sounds: No murmur heard.     No gallop.   Pulmonary:      Effort: No respiratory distress.      Breath sounds: No stridor. No wheezing, rhonchi or rales.   Abdominal:      General: Abdomen is flat. There is no distension.      Palpations: There is no mass.      Tenderness: There is no abdominal tenderness. There is no left CVA tenderness, guarding or rebound.   Musculoskeletal:      Cervical back: Normal range of motion. No rigidity.      Right lower leg: No edema.      Left lower leg: No edema.   Neurological:      General: No focal deficit present.      Mental Status: She is oriented to person, place, and time.      Cranial Nerves: No cranial nerve deficit.      Motor: No weakness.   Psychiatric:         Mood and Affect: Mood normal.         Behavior: Behavior normal.         Laboratory:  Recent Labs     06/19/24  1012   WBC 6.5   RBC 5.03   HEMOGLOBIN 15.4   HEMATOCRIT 46.2   MCV 91.8   MCH 30.6   MCHC 33.3   RDW 46.8   PLATELETCT 272   MPV 9.6     Recent Labs     06/19/24  1012   SODIUM 136   POTASSIUM 4.0   CHLORIDE 101   CO2 24   GLUCOSE 124*   BUN 22   CREATININE 1.08   CALCIUM 9.1     Recent Labs      "06/19/24  1012   ALTSGPT 24   ASTSGOT 22   ALKPHOSPHAT 75   TBILIRUBIN 0.4   GLUCOSE 124*         No results for input(s): \"NTPROBNP\" in the last 72 hours.      Recent Labs     06/19/24  1012 06/19/24  1211   TROPONINT 16 18       Imaging:  DX-CHEST-PORTABLE (1 VIEW)   Final Result      No acute cardiac or pulmonary abnormalities are identified.      EC-ECHOCARDIOGRAM COMPLETE W/O CONT    (Results Pending)       X-Ray:  I have personally reviewed the images and compared with prior images.    KG reviewed me shows sinus bradycardia-rate of 49.  R wave progression.  Q waves in inferior leads    Assessment/Plan:  Justification for Admission Status  I anticipate this patient will require at least two midnights for appropriate medical management, necessitating inpatient admission because I suspect patient require greater than 48 hours evaluation and treatment of her arrhythmia        * Chest pain- (present on admission)  Assessment & Plan  The ASCVD Risk score ([place] WAGONER, et al., 2019) failed to calculate for the following reasons:    The 2019 ASCVD risk score is only valid for ages 40 to 79      Could be related to possible ACS versus the rapid heart rhythm    Check serial troponins    Check echocardiogram    Persantine stress test    Paroxysmal atrial fibrillation (HCC)- (present on admission)  Assessment & Plan  Monitor on telemetry    iscHemic workup    Thyroid function test within normal limits    Echo     check stress test        Obesity due to excess calories with serious comorbidity- (present on admission)  Assessment & Plan  Weight loss and exercise recommended    Hypertensive urgency- (present on admission)  Assessment & Plan  Resume patient's Diovan    Checked orthostatics --> id patient is not orthostatic -> consider initiation of diuretic    Symptomatic bradycardia- (present on admission)  Assessment & Plan  Monitor telemetry    TFTs within normal limits    Echo    Hold beta-blocker        Controlled type 2 " diabetes mellitus with hyperglycemia, without long-term current use of insulin (HCC)- (present on admission)  Assessment & Plan  Check a hemoglobin A1c    Fingerstick with sliding scale insulin          VTE prophylaxis: SCDs/TEDs

## 2024-06-19 NOTE — ASSESSMENT & PLAN NOTE
Resume patient's Diovan    Checked orthostatics --> id patient is not orthostatic -> consider initiation of diuretic

## 2024-06-19 NOTE — ASSESSMENT & PLAN NOTE
Monitor on telemetry    iscHemic workup    Thyroid function test within normal limits    Echo     check stress test

## 2024-06-19 NOTE — DISCHARGE PLANNING
Case Management Discharge Planning    Delivered Code 44 form 2 to patient at bedside at request of UR nurse.

## 2024-06-20 ENCOUNTER — APPOINTMENT (OUTPATIENT)
Dept: RADIOLOGY | Facility: MEDICAL CENTER | Age: 84
End: 2024-06-20
Attending: HOSPITALIST
Payer: MEDICARE

## 2024-06-20 ENCOUNTER — PHARMACY VISIT (OUTPATIENT)
Dept: PHARMACY | Facility: MEDICAL CENTER | Age: 84
End: 2024-06-20
Payer: COMMERCIAL

## 2024-06-20 VITALS
WEIGHT: 176.37 LBS | HEART RATE: 63 BPM | OXYGEN SATURATION: 96 % | DIASTOLIC BLOOD PRESSURE: 68 MMHG | SYSTOLIC BLOOD PRESSURE: 158 MMHG | BODY MASS INDEX: 30.11 KG/M2 | HEIGHT: 64 IN | TEMPERATURE: 97.4 F | RESPIRATION RATE: 17 BRPM

## 2024-06-20 LAB
ALBUMIN SERPL BCP-MCNC: 3.5 G/DL (ref 3.2–4.9)
ALBUMIN/GLOB SERPL: 1.3 G/DL
ALP SERPL-CCNC: 74 U/L (ref 30–99)
ALT SERPL-CCNC: 23 U/L (ref 2–50)
ANION GAP SERPL CALC-SCNC: 12 MMOL/L (ref 7–16)
AST SERPL-CCNC: 23 U/L (ref 12–45)
BILIRUB SERPL-MCNC: 0.3 MG/DL (ref 0.1–1.5)
BUN SERPL-MCNC: 25 MG/DL (ref 8–22)
CALCIUM ALBUM COR SERPL-MCNC: 9.4 MG/DL (ref 8.5–10.5)
CALCIUM SERPL-MCNC: 9 MG/DL (ref 8.5–10.5)
CHLORIDE SERPL-SCNC: 104 MMOL/L (ref 96–112)
CO2 SERPL-SCNC: 23 MMOL/L (ref 20–33)
CREAT SERPL-MCNC: 1.19 MG/DL (ref 0.5–1.4)
EKG IMPRESSION: NORMAL
ERYTHROCYTE [DISTWIDTH] IN BLOOD BY AUTOMATED COUNT: 46.9 FL (ref 35.9–50)
GFR SERPLBLD CREATININE-BSD FMLA CKD-EPI: 45 ML/MIN/1.73 M 2
GLOBULIN SER CALC-MCNC: 2.8 G/DL (ref 1.9–3.5)
GLUCOSE SERPL-MCNC: 113 MG/DL (ref 65–99)
HCT VFR BLD AUTO: 43.5 % (ref 37–47)
HGB BLD-MCNC: 14.5 G/DL (ref 12–16)
LV EJECT FRACT  99904: 65
LV EJECT FRACT MOD 2C 99903: 65.87
LV EJECT FRACT MOD 4C 99902: 65.01
LV EJECT FRACT MOD BP 99901: 66.18
MCH RBC QN AUTO: 30.5 PG (ref 27–33)
MCHC RBC AUTO-ENTMCNC: 33.3 G/DL (ref 32.2–35.5)
MCV RBC AUTO: 91.6 FL (ref 81.4–97.8)
PLATELET # BLD AUTO: 266 K/UL (ref 164–446)
PMV BLD AUTO: 10.1 FL (ref 9–12.9)
POTASSIUM SERPL-SCNC: 4.2 MMOL/L (ref 3.6–5.5)
PROT SERPL-MCNC: 6.3 G/DL (ref 6–8.2)
RBC # BLD AUTO: 4.75 M/UL (ref 4.2–5.4)
SODIUM SERPL-SCNC: 139 MMOL/L (ref 135–145)
TROPONIN T SERPL-MCNC: 21 NG/L (ref 6–19)
TROPONIN T SERPL-MCNC: 23 NG/L (ref 6–19)
WBC # BLD AUTO: 7.8 K/UL (ref 4.8–10.8)

## 2024-06-20 PROCEDURE — 99239 HOSP IP/OBS DSCHRG MGMT >30: CPT | Performed by: GENERAL PRACTICE

## 2024-06-20 PROCEDURE — G0378 HOSPITAL OBSERVATION PER HR: HCPCS

## 2024-06-20 PROCEDURE — 93306 TTE W/DOPPLER COMPLETE: CPT | Mod: 26 | Performed by: INTERNAL MEDICINE

## 2024-06-20 PROCEDURE — 84484 ASSAY OF TROPONIN QUANT: CPT

## 2024-06-20 PROCEDURE — 700102 HCHG RX REV CODE 250 W/ 637 OVERRIDE(OP): Performed by: HOSPITALIST

## 2024-06-20 PROCEDURE — 78452 HT MUSCLE IMAGE SPECT MULT: CPT

## 2024-06-20 PROCEDURE — 700111 HCHG RX REV CODE 636 W/ 250 OVERRIDE (IP)

## 2024-06-20 PROCEDURE — 96366 THER/PROPH/DIAG IV INF ADDON: CPT

## 2024-06-20 PROCEDURE — 96372 THER/PROPH/DIAG INJ SC/IM: CPT

## 2024-06-20 PROCEDURE — 96375 TX/PRO/DX INJ NEW DRUG ADDON: CPT

## 2024-06-20 PROCEDURE — A9270 NON-COVERED ITEM OR SERVICE: HCPCS | Performed by: HOSPITALIST

## 2024-06-20 PROCEDURE — 700111 HCHG RX REV CODE 636 W/ 250 OVERRIDE (IP): Mod: JZ | Performed by: HOSPITALIST

## 2024-06-20 PROCEDURE — RXMED WILLOW AMBULATORY MEDICATION CHARGE: Performed by: GENERAL PRACTICE

## 2024-06-20 PROCEDURE — 93010 ELECTROCARDIOGRAM REPORT: CPT | Performed by: INTERNAL MEDICINE

## 2024-06-20 RX ORDER — REGADENOSON 0.08 MG/ML
INJECTION, SOLUTION INTRAVENOUS
Status: COMPLETED
Start: 2024-06-20 | End: 2024-06-20

## 2024-06-20 RX ORDER — AMINOPHYLLINE 25 MG/ML
100 INJECTION, SOLUTION INTRAVENOUS
Status: COMPLETED | OUTPATIENT
Start: 2024-06-20 | End: 2024-06-20

## 2024-06-20 RX ORDER — REGADENOSON 0.08 MG/ML
0.4 INJECTION, SOLUTION INTRAVENOUS ONCE
Status: COMPLETED | OUTPATIENT
Start: 2024-06-20 | End: 2024-06-20

## 2024-06-20 RX ADMIN — REGADENOSON 0.4 MG: 0.08 INJECTION, SOLUTION INTRAVENOUS at 08:45

## 2024-06-20 RX ADMIN — INSULIN HUMAN 2 UNITS: 100 INJECTION, SOLUTION PARENTERAL at 11:52

## 2024-06-20 RX ADMIN — HYDRALAZINE HYDROCHLORIDE 10 MG: 20 INJECTION INTRAMUSCULAR; INTRAVENOUS at 00:27

## 2024-06-20 RX ADMIN — REGADENOSON 0.4 MG: 0.08 INJECTION, SOLUTION INTRAVENOUS at 08:52

## 2024-06-20 RX ADMIN — AMINOPHYLLINE 100 MG: 25 INJECTION, SOLUTION INTRAVENOUS at 08:57

## 2024-06-20 RX ADMIN — APIXABAN 5 MG: 5 TABLET, FILM COATED ORAL at 05:15

## 2024-06-20 ASSESSMENT — PAIN DESCRIPTION - PAIN TYPE: TYPE: ACUTE PAIN

## 2024-06-20 ASSESSMENT — FIBROSIS 4 INDEX: FIB4 SCORE: 1.51

## 2024-06-20 NOTE — DISCHARGE SUMMARY
Discharge Summary    CHIEF COMPLAINT ON ADMISSION  Chief Complaint   Patient presents with    Chest Pain     Starting this AM, pressure upper chest radiating to BUE, pt was found to be in a rapid A-fib rate 120-160, denies hx of A-fib, pt spontaneously converted to sinus yolanda en route and states chest pressure ended at that time, denies any cardiac hx        Reason for Admission  ems     Admission Date  6/19/2024    CODE STATUS  Full Code    HPI & HOSPITAL COURSE  This is an 83-year-old female with past medical history of hypertension, dyslipidemia, type 2 diabetes and obesity who was admitted on 6/19/2024 with chest pain.    Patient was found to have A-fib with RVR, spontaneously converted to sinus bradycardia.  TTE with normal LVEF, no valvular abnormalities, no pericardial effusion, unchanged from echocardiogram in 2022.  TSH within normal limits.  [id number] of 5. Patient started on Eliquis.  Stress test with no evidence of ischemia.    Therefore, she is discharged in good and stable condition to home with close outpatient follow-up.    The patient recovered much more quickly than anticipated on admission.    Discharge Date  6/20/2024    FOLLOW UP ITEMS POST DISCHARGE  Primary care physician  Cardiology    DISCHARGE DIAGNOSES  Principal Problem:    Chest pain (POA: Yes)  Active Problems:    Controlled type 2 diabetes mellitus with hyperglycemia, without long-term current use of insulin (HCC) (POA: Yes)    Symptomatic bradycardia (POA: Yes)    Hypertensive urgency (POA: Yes)    Obesity due to excess calories with serious comorbidity (POA: Yes)    Paroxysmal atrial fibrillation (HCC) (POA: Yes)  Resolved Problems:    * No resolved hospital problems. *      FOLLOW UP  Future Appointments   Date Time Provider Department Center   6/20/2024  8:00 AM [address]   9/9/2024 12:45 PM LESIA DiazLEXY None   10/10/2024  1:20 PM KENNETH [facility] None     [facility]  [address]  [place] [id number]  [phone]  Follow up        MEDICATIONS ON DISCHARGE     Medication List        START taking these medications        Instructions   apixaban 5mg Tabs  Commonly known as: Eliquis   Take 1 Tablet by mouth 2 times a day for 90 days. Indications: Thromboembolism secondary to Atrial Fibrillation  Dose: 5 mg            CONTINUE taking these medications        Instructions   CORAL CALCIUM PO   Take 1 Tablet by mouth every evening.  Dose: 1 Tablet     Jardiance 10 MG Tabs tablet  Generic drug: Empagliflozin   Take 1 Tablet by mouth every day.  Dose: 10 mg     rosuvastatin 10 MG Tabs  Commonly known as: Crestor   Take 1 Tablet by mouth every evening.  Dose: 10 mg     SYSTANE OP   Administer 1 Drop into both eyes every 2 hours. For dry eyes  Dose: 1 Drop     valsartan 320 MG tablet  Commonly known as: Diovan   Take 1 Tablet by mouth every day.  Dose: 320 mg     Xhance 93 MCG/ACT Exhu  Generic drug: Fluticasone Propionate   Doctor's comments: Last office visit:[id number]  Instill 1 spray in each nostril twice daily            STOP taking these medications      Nebivolol HCl 20 MG Tabs              Allergies  Allergies   Allergen Reactions    Food Anaphylaxis and Vomiting     mussels    Sulfa Drugs Anaphylaxis    Augmentin Vomiting    Nifedipine Swelling     Leg edema       DIET  Orders Placed This Encounter   Procedures    Diet Order Diet: Consistent CHO (Diabetic); Miscellaneous modifications: (optional): No Decaf, No Caffeine(for test)     Standing Status:   Standing     Number of Occurrences:   1     Order Specific Question:   Diet:     Answer:   Consistent CHO (Diabetic) [4]     Order Specific Question:   Miscellaneous modifications: (optional)     Answer:   No Decaf, No Caffeine(for test) [11]       ACTIVITY  As tolerated.  Weight bearing as tolerated    CONSULTATIONS  None    PROCEDURES  None    LABORATORY  Lab Results   Component Value  Date    SODIUM 139 06/19/2024    POTASSIUM 4.2 06/19/2024    CHLORIDE 104 06/19/2024    CO2 23 06/19/2024    GLUCOSE 113 (H) 06/19/2024    BUN 25 (H) 06/19/2024    CREATININE 1.19 06/19/2024    CREATININE 0.87 06/23/2009    GLOMRATE >59 06/23/2009        Lab Results   Component Value Date    WBC 7.8 06/19/2024    HEMOGLOBIN 14.5 06/19/2024    HEMATOCRIT 43.5 06/19/2024    PLATELETCT 266 06/19/2024      NM-CARDIAC STRESS TEST   Final Result      EC-ECHOCARDIOGRAM COMPLETE W/O CONT   Final Result      DX-CHEST-PORTABLE (1 VIEW)   Final Result      No acute cardiac or pulmonary abnormalities are identified.         Total time of the discharge process exceeds 45 minutes.

## 2024-06-20 NOTE — DISCHARGE PLANNING
HTH/SCP TCN chart review completed.  Per chart patient has low LACE and high 6 clicks score.  Additionally patient ambulating in hallway (to BR on T2) with no AD or assist and patient is on RA. No TCN needs anticipated at this time. Should post acute discharge needs arise warranting TCN involvement, please contact TCN team via Voalte. Thank you.

## 2024-06-20 NOTE — CARE PLAN
The patient is Stable - Low risk of patient condition declining or worsening    Shift Goals  Clinical Goals: Monitor Rhythm  Patient Goals: Rest  Family Goals: IRWIN    Problem: Pain - Standard  Goal: Alleviation of pain or a reduction in pain to the patient’s comfort goal  Description: Target End Date:  Prior to discharge or change in level of care    Document on Vitals flowsheet    1.  Document pain using the appropriate pain scale per order or unit policy  2.  Educate and implement non-pharmacologic comfort measures (i.e. relaxation, distraction, massage, cold/heat therapy, etc.)  3.  Pain management medications as ordered  4.  Reassess pain after pain med administration per policy  5.  If opiods administered assess patient's response to pain medication is appropriate per POSS sedation scale  6.  Follow pain management plan developed in collaboration with patient and interdisciplinary team (including palliative care or pain specialists if applicable)  Outcome: Progressing     Problem: Hemodynamics  Goal: Patient's hemodynamics, fluid balance and neurologic status will be stable or improve  Description: Target End Date:  Prior to discharge or change in level of care    Document on Assessment and I/O flowsheet templates    1.  Monitor vital signs, pulse oximetry and cardiac monitor per provider order and/or policy  2.  Maintain blood pressure per provider order  3.  Hemodynamic monitoring per provider order  4.  Manage IV fluids and IV infusions  5.  Monitor intake and output  6.  Daily weights per unit policy or provider order  7.  Assess peripheral pulses and capillary refill  8.  Assess color and body temperature  9.  Position patient for maximum circulation/cardiac output  10. Monitor for signs/symptoms of excessive bleeding  11. Assess mental status, restlessness and changes in level of consciousness  12. Monitor temperature and report fever or hypothermia to provider immediately. Consideration of targeted  temperature management.  Outcome: Progressing

## 2024-06-20 NOTE — HOSPITAL COURSE
This is an 83-year-old female with past medical history of hypertension, dyslipidemia, type 2 diabetes and obesity who was admitted on 6/19/2024 with chest pain.    Patient was found to have A-fib with RVR, spontaneously converted to sinus bradycardia.  TTE with normal LVEF, no valvular abnormalities, no pericardial effusion, unchanged from echocardiogram in 2022.  TSH within normal limits.  [id number] of 5. Patient started on Eliquis.  Stress test with no evidence of ischemia.

## 2024-06-24 NOTE — PROGRESS NOTES
No chief complaint on file.      Subjective     Kacie Rubalcava is a 83 y.o. female who presents today for cardiology follow-up after recent hospitalization on 6/19/2024 for paroxysmal A-fib, symptomatic with chest pain.  Patient has additional medical problems of hypertension, dyslipidemia, T2DM, BMI 30    Patient of Dr. Joseph, she was last seen on 3//2024.    ***Patient was found to have A-fib with RVR, spontaneously converted to sinus bradycardia. TTE with normal LVEF, no valvular abnormalities, no pericardial effusion, unchanged from echocardiogram in 2022. TSH within normal limits. [id number] of 5. Patient started on Eliquis. Stress test with no evidence of ischemia.   Past Medical History:   Diagnosis Date    Anxiety     Arthritis 03/12/15    generalized    Chickenpox     Degenerative joint disease     Epidermal cyst 10/16/2018    Kinyarwanda measles     High cholesterol     HTN (hypertension), benign     Hyperlipemia     Hypertension     Microscopic colitis 2010    Neck pain on left side 5/31/2017    Osteoporosis     Other specified symptom associated with female genital organs     post menopausal bleeding    Seborrheic keratosis 10/16/2018    Snoring      Past Surgical History:   Procedure Laterality Date    HYSTEROSCOPY WITH VIDEO DIAGNOSTIC  3/13/2015    Performed by Alexandria Keenan M.D. at [facility]    DILATION AND CURETTAGE  3/13/2015    Performed by Alexandria Keenan M.D. at [facility]    BREAST BIOPSY  11/13/08    Performed by ISRAEL HICKS at [facility]    COLON RESECTION  2000    polyps    APPENDECTOMY      OTHER NEUROLOGICAL SURG      TONSILLECTOMY      US-NEEDLE CORE BX-BREAST PANEL       Family History   Problem Relation Age of Onset    Lung Disease Mother     Cancer Mother         lung cancer    Cancer Paternal Aunt         breast     Social History     Socioeconomic History    Marital status: Single     Spouse name: Not on file     Number of children: Not on file    Years of education: Not on file    Highest education level: Not on file   Occupational History    Not on file   Tobacco Use    Smoking status: Never    Smokeless tobacco: Never   Vaping Use    Vaping status: Never Used   Substance and Sexual Activity    Alcohol use: Yes     Comment: Occ    Drug use: No    Sexual activity: Yes     Partners: Male   Other Topics Concern    Not on file   Social History Narrative    Not on file     Social Determinants of Health     Financial Resource Strain: Not on file   Food Insecurity: No Food Insecurity (6/19/2024)    Hunger Vital Sign     Worried About Running Out of Food in the Last Year: Never true     Ran Out of Food in the Last Year: Never true   Transportation Needs: No Transportation Needs (6/19/2024)    PRAPARE - Transportation     Lack of Transportation (Medical): No     Lack of Transportation (Non-Medical): No   Physical Activity: Not on file   Stress: Not on file   Social Connections: Not on file   Intimate Partner Violence: Not At Risk (6/19/2024)    Humiliation, Afraid, Rape, and Kick questionnaire     Fear of Current or Ex-Partner: No     Emotionally Abused: No     Physically Abused: No     Sexually Abused: No   Housing Stability: Low Risk  (6/19/2024)    Housing Stability Vital Sign     Unable to Pay for Housing in the Last Year: No     Number of Places Lived in the Last Year: 1     Unstable Housing in the Last Year: No     Allergies   Allergen Reactions    Food Anaphylaxis and Vomiting     mussels    Sulfa Drugs Anaphylaxis    Augmentin Vomiting    Nifedipine Swelling     Leg edema     Outpatient Encounter Medications as of 6/25/2024   Medication Sig Dispense Refill    apixaban (ELIQUIS) 5mg Tab Take 1 Tablet by mouth 2 times a day for 90 days. Indications: Thromboembolism secondary to Atrial Fibrillation 180 Tablet 0    Polyethyl Glycol-Propyl Glycol (SYSTANE OP) Administer 1 Drop into both eyes every 2 hours. For dry eyes       Fluticasone Propionate (XHANCE) 93 MCG/ACT Exhaler Suspension Instill 1 spray in each nostril twice daily 60 mL 11    Empagliflozin (JARDIANCE) 10 MG Tab tablet Take 1 Tablet by mouth every day. 90 Tablet 3    valsartan (DIOVAN) 320 MG tablet Take 1 Tablet by mouth every day. 100 Tablet 4    rosuvastatin (CRESTOR) 10 MG Tab Take 1 Tablet by mouth every evening. 100 Tablet 4    CORAL CALCIUM PO Take 1 Tablet by mouth every evening. 60 Each      No facility-administered encounter medications on file as of 6/25/2024.     ROS           Objective     LMP 01/01/2002     Physical Exam           Assessment & Plan     No diagnosis found.    Medical Decision Making: Today's Assessment/Status/Plan:                         Abdomen is soft.   Musculoskeletal:      Right lower leg: No edema.      Left lower leg: No edema.   Skin:     General: Skin is warm and dry.      Findings: No erythema.   Neurological:      Mental Status: She is alert and oriented to person, place, and time.   Psychiatric:         Behavior: Behavior normal.       TTE (6/19/2024):  Mild concentric left ventricular hypertrophy. Normal left ventricular   size and systolic function.  Normal right ventricular size and systolic function.  Normal left atrial size.  Normal pericardium without effusion.     Compared to the prior study on 11/8/22, similar findings.    NM-Stress (6/20/2024):  No evidence of significant jeopardized viable myocardium or prior myocardial    infarction.   Normal left ventricular size, ejection fraction, and wall motion.         Assessment & Plan     1. Primary hypertension  Cardiac Event Monitor    EKG - Clinic Performed    CANCELED: Cardiac Event Monitor      2. Dyslipidemia  Cardiac Event Monitor    EKG - Clinic Performed    CANCELED: Cardiac Event Monitor      3. Paroxysmal atrial fibrillation (HCC)  Cardiac Event Monitor    EKG - Clinic Performed    apixaban (ELIQUIS) 5mg Tab    CANCELED: Cardiac Event Monitor      4. Type 2 diabetes mellitus with hyperglycemia, without long-term current use of insulin (HCC)  Empagliflozin (JARDIANCE) 10 MG Tab tablet      5. Stage 3a chronic kidney disease        6. Hypercoagulable state due to paroxysmal atrial fibrillation (HCC)            Medical Decision Making: Today's Assessment/Status/Plan:        Paroxysmal Atrial Fibrillation (PAF)  - Asymptomatic, denies AF breakthrough, rate controlled.   - EKG today showed SB  - On OAC with Eliquis, continue.  -Hold beta-blocker therapy due to bradycardia  -Cardiac event monitoring to determine A-fib burden    Hypertension; hyperlipidemia, CKD 3a  -Continue Jardiance 10 mg daily  -Continue valsartan 320 mg daily.  Blood pressure well-controlled in office  today  -Continue rosuvastatin 10 mg every evening.    FU in clinic in 3 months with Dr. Joseph. Sooner if needed.    Patient verbalizes understanding and agrees with the plan of care.       MAGNO Simms, HF-Cert   [facility]  [phone]    PLEASE NOTE: This Note was created using voice recognition Software. I have made every reasonable attempt to correct obvious errors, but I expect that there are errors of grammar and possibly content that I did not discover before finalizing the note    HCC Gap Form    Diagnosis to address: E11.65 - Controlled type 2 diabetes mellitus with hyperglycemia, without long-term current use of insulin (HCC)  Assessment and plan: Chronic, stable, as based on today's assessment and impact on other conditions evaluated today. Continue with current treatment plan: Follow-up with specialist as directed, but at least annually.  Diagnosis: I48.0 - Paroxysmal atrial fibrillation (HCC)  Assessment and plan: Chronic, stable. Continue with current defined treatment plan: per above. Follow-up at least annually.  Last edited 07/09/24 15:33 PDT by MAGNO Simms.

## 2024-06-25 ENCOUNTER — OFFICE VISIT (OUTPATIENT)
Dept: CARDIOLOGY | Facility: MEDICAL CENTER | Age: 84
End: 2024-06-25
Attending: NURSE PRACTITIONER
Payer: MEDICARE

## 2024-06-25 VITALS
HEIGHT: 64 IN | WEIGHT: 175 LBS | SYSTOLIC BLOOD PRESSURE: 124 MMHG | BODY MASS INDEX: 29.88 KG/M2 | DIASTOLIC BLOOD PRESSURE: 68 MMHG | HEART RATE: 70 BPM | OXYGEN SATURATION: 94 % | RESPIRATION RATE: 16 BRPM

## 2024-06-25 DIAGNOSIS — I48.0 PAROXYSMAL ATRIAL FIBRILLATION (HCC): ICD-10-CM

## 2024-06-25 DIAGNOSIS — E11.65 TYPE 2 DIABETES MELLITUS WITH HYPERGLYCEMIA, WITHOUT LONG-TERM CURRENT USE OF INSULIN (HCC): ICD-10-CM

## 2024-06-25 DIAGNOSIS — D68.69 HYPERCOAGULABLE STATE DUE TO PAROXYSMAL ATRIAL FIBRILLATION (HCC): ICD-10-CM

## 2024-06-25 DIAGNOSIS — I48.0 HYPERCOAGULABLE STATE DUE TO PAROXYSMAL ATRIAL FIBRILLATION (HCC): ICD-10-CM

## 2024-06-25 DIAGNOSIS — E78.5 DYSLIPIDEMIA: ICD-10-CM

## 2024-06-25 DIAGNOSIS — N18.31 STAGE 3A CHRONIC KIDNEY DISEASE: ICD-10-CM

## 2024-06-25 DIAGNOSIS — I10 PRIMARY HYPERTENSION: ICD-10-CM

## 2024-06-25 PROCEDURE — 99212 OFFICE O/P EST SF 10 MIN: CPT | Performed by: NURSE PRACTITIONER

## 2024-06-25 PROCEDURE — 3074F SYST BP LT 130 MM HG: CPT | Performed by: NURSE PRACTITIONER

## 2024-06-25 PROCEDURE — 93005 ELECTROCARDIOGRAM TRACING: CPT | Performed by: NURSE PRACTITIONER

## 2024-06-25 PROCEDURE — 3078F DIAST BP <80 MM HG: CPT | Performed by: NURSE PRACTITIONER

## 2024-06-25 PROCEDURE — 99214 OFFICE O/P EST MOD 30 MIN: CPT | Performed by: NURSE PRACTITIONER

## 2024-06-25 RX ORDER — EMPAGLIFLOZIN 10 MG/1
10 TABLET, FILM COATED ORAL DAILY
Qty: 90 TABLET | Refills: 3 | Status: SHIPPED | OUTPATIENT
Start: 2024-06-25

## 2024-06-25 ASSESSMENT — FIBROSIS 4 INDEX: FIB4 SCORE: 1.51

## 2024-06-26 LAB — EKG IMPRESSION: NORMAL

## 2024-06-26 PROCEDURE — 93010 ELECTROCARDIOGRAM REPORT: CPT | Performed by: INTERNAL MEDICINE

## 2024-06-27 ENCOUNTER — OFFICE VISIT (OUTPATIENT)
Dept: INTERNAL MEDICINE | Facility: IMAGING CENTER | Age: 84
End: 2024-06-27
Payer: MEDICARE

## 2024-06-27 ENCOUNTER — TELEPHONE (OUTPATIENT)
Dept: CARDIOLOGY | Facility: MEDICAL CENTER | Age: 84
End: 2024-06-27

## 2024-06-27 VITALS
HEART RATE: 54 BPM | WEIGHT: 175.04 LBS | HEIGHT: 64 IN | RESPIRATION RATE: 17 BRPM | BODY MASS INDEX: 29.88 KG/M2 | DIASTOLIC BLOOD PRESSURE: 70 MMHG | TEMPERATURE: 97.9 F | SYSTOLIC BLOOD PRESSURE: 126 MMHG | OXYGEN SATURATION: 98 %

## 2024-06-27 DIAGNOSIS — J30.89 NON-SEASONAL ALLERGIC RHINITIS DUE TO OTHER ALLERGIC TRIGGER: ICD-10-CM

## 2024-06-27 DIAGNOSIS — R09.82 POST-NASAL DRIP: ICD-10-CM

## 2024-06-27 DIAGNOSIS — R00.1 SINUS BRADYCARDIA: Chronic | ICD-10-CM

## 2024-06-27 DIAGNOSIS — Z79.01 ANTICOAGULATED: ICD-10-CM

## 2024-06-27 DIAGNOSIS — Z09 HOSPITAL DISCHARGE FOLLOW-UP: ICD-10-CM

## 2024-06-27 DIAGNOSIS — K52.838 OTHER MICROSCOPIC COLITIS: ICD-10-CM

## 2024-06-27 DIAGNOSIS — I48.0 PAROXYSMAL ATRIAL FIBRILLATION (HCC): ICD-10-CM

## 2024-06-27 PROBLEM — I16.0 HYPERTENSIVE URGENCY: Status: RESOLVED | Noted: 2024-06-19 | Resolved: 2024-06-27

## 2024-06-27 PROBLEM — R73.09 ELEVATED HEMOGLOBIN A1C: Status: RESOLVED | Noted: 2023-02-07 | Resolved: 2024-06-27

## 2024-06-27 PROBLEM — R07.9 CHEST PAIN: Status: RESOLVED | Noted: 2024-06-19 | Resolved: 2024-06-27

## 2024-06-27 RX ORDER — DIPHENOXYLATE HYDROCHLORIDE AND ATROPINE SULFATE 2.5; .025 MG/1; MG/1
1 TABLET ORAL 4 TIMES DAILY PRN
Qty: 28 TABLET | Refills: 3 | Status: SHIPPED | OUTPATIENT
Start: 2024-06-27 | End: 2024-07-04

## 2024-06-27 ASSESSMENT — FIBROSIS 4 INDEX: FIB4 SCORE: 1.51

## 2024-06-27 NOTE — PROGRESS NOTES
Chief Complaint   Patient presents with    Hospital Follow-up    Medication Refill     Lomotil to [facility] in [place].       HPI:  Patient is a 83 y.o. female established patient who presents today for a hospital follow up appointment. On 6/19/24, she experienced chest pressure radiating to both upper extremities and was found to be in atrial fibrillation with RVR. She spontaneously converted to NSR en route to ER and was admitted to [facility] for further evaluation and treatment. Echo was unchanged as compared to one done in 2022, and associated labs were normal. She was started on Eliquis 5 mg BID and discharged home on 6/20/24 in stable condition. She has a follow up appointment for Zio patch (or similar device) initiation on 6/28/24, and she remains asymptomatic at this time.     She continues to experience allergic rhinitis/PND refractory to many different treatments in the past, and she has ongoing follow with her allergy team for additional management. She also suffers from chronic microscopic colitis with intermittent diarrhea managed with PRN Lomotil use. She continues to benefit from Lomotil use, denies related side effects, and use this controlled medication in a safe manner.     Patient Active Problem List    Diagnosis Date Noted    Anticoagulated 06/27/2024    Obesity due to excess calories with serious comorbidity 06/19/2024    Paroxysmal atrial fibrillation (HCC) 06/19/2024    Breast cyst, left 11/01/2023    Risk for falls 08/14/2023    Allergic rhinitis 08/14/2023    Chronic pain of left knee 06/21/2023    OAB (overactive bladder) 02/07/2023    Post-nasal drip 02/07/2023    Lumbar foraminal stenosis 02/07/2023    Chronic back pain 02/07/2023    Stage 3a chronic kidney disease 07/10/2022    Controlled type 2 diabetes mellitus with hyperglycemia, without long-term current use of insulin (HCC) 07/10/2022    SHIRA (obstructive sleep apnea) 06/21/2021    Epidermal cyst 10/16/2018    Seborrheic keratosis  "10/16/2018    Microscopic colitis 09/05/2017    Essential hypertension 12/01/2015    Mixed hyperlipidemia with apolipoprotein E2 variant 04/09/2015    Insulin resistance 04/09/2015    Metabolic syndrome 04/09/2015    Sinus bradycardia 03/16/2015    Eczema 10/14/2014    Osteoporosis, post-menopausal 10/14/2014    Environmental allergies 04/18/2014    Vitamin D deficiency disease 11/13/2013    Dupuytren's contracture of both hands 05/22/2013    Degenerative joint disease        Past medical, surgical, family, and social history was reviewed and updated in Epic chart by me today.     Medications and allergies reviewed and updated in Epic chart by me today.     ROS:  Pertinent positives listed above in HPI. All other systems have been reviewed and are negative.    PE:   /70 (BP Location: Left arm, Patient Position: Sitting, BP Cuff Size: Adult)   Pulse (!) 54   Temp 36.6 °C (97.9 °F) (Temporal)   Resp 17   Ht 1.626 m (5' 4\")   Wt 79.4 kg (175 lb 0.7 oz)   LMP 01/01/2002   SpO2 98%   BMI 30.05 kg/m²   Vital signs reviewed with patient.     Gen: Well developed; well nourished; no acute distress; age appropriate appearance   CV: Regular rate and rhythm; S1/ S2 present; no murmur, gallop or rub noted  Pulm: No respiratory distress; clear to ascultation b/l; no wheezing or stridor noted b/l   Extremities: No new peripheral edema b/l LE extremities (chronic edema/lipidema)/ no clubbing nor cyanosis noted  Skin: Warm and dry; no rashes noted   Neuro: No new focal deficits noted   Psych: AAOx4; mood and affect are at her baseline    A/P:  1. Hospital discharge follow-up  Patient was hospitalized at [facility] 6/19/24 - 6/20/24, and I have reviewed all pertinent documentation prior to our visit today.     2. Paroxysmal atrial fibrillation (HCC)  On 6/19/24, patient experienced upper chest pressure with radiation and was noted to be in atrial fibrillation with RVR. She spontaneously converted to NSR en route to ER. " Eliquis 5 mg BID was initiated and she is scheduled for Zio patch (or similar device) initiation on 6/28/24. Patient remains in NSR at this time and is asymptomatic.    3. Sinus bradycardia  Patient is stable and remains asymptomatic at this time.     4. Anticoagulated  Patient is tolerating Eliquis 5 mg BID use managed by [facility].     5. Other microscopic colitis  Chronic condition managed by [facility]. She uses PRN Lomotil to manage associated diarrhea and continues to benefit from medication use.  reviewed today and no concerns noted. Pt is well versed in safe use of controlled medication, and benefit of use outweighs risk at this time. New RX sent to pharmacy.   - diphenoxylate-atropine (LOMOTIL) 2.5-0.025 MG Tab; Take 1 Tablet by mouth 4 times a day as needed for Diarrhea for up to 7 days.  Dispense: 28 Tablet; Refill: 3    6. Non-seasonal allergic rhinitis due to other allergic trigger/ Post-nasal drip  Chronic, ongoing condition managed by her Allergy team (she has a follow up appointment today).

## 2024-06-27 NOTE — TELEPHONE ENCOUNTER
JG        Caller: Kacie Rubalcava      Topic/issue: Patient was calling with some questions regarding her test that she has scheduled for tomorrow and she asked for a call back      Callback Number: [phone]      Thank you    -Alexandre WATSON

## 2024-06-28 ENCOUNTER — NON-PROVIDER VISIT (OUTPATIENT)
Dept: CARDIOLOGY | Facility: MEDICAL CENTER | Age: 84
End: 2024-06-28
Attending: INTERNAL MEDICINE
Payer: MEDICARE

## 2024-06-28 DIAGNOSIS — E78.5 DYSLIPIDEMIA: ICD-10-CM

## 2024-06-28 DIAGNOSIS — I10 PRIMARY HYPERTENSION: ICD-10-CM

## 2024-06-28 DIAGNOSIS — I48.0 PAROXYSMAL ATRIAL FIBRILLATION (HCC): ICD-10-CM

## 2024-06-28 PROCEDURE — 93246 EXT ECG>7D<15D RECORDING: CPT

## 2024-06-28 NOTE — PROGRESS NOTES
Patient enrolled in the 14 day Zio Holter monitor per Ita Joseph MD.  In clinic hook up, monitor s/n [id number]. Pending EOS.

## 2024-06-28 NOTE — TELEPHONE ENCOUNTER
Returned pt call, [phone], and reviewed concerns.  unable to reach.  Left voicemail to return this call at their earliest convenience.    Upon chart review, pt is active on [employer].  Last login 6/20/2024.  [employer] message sent requesting response of questions she had for tomorrow's appoitment, awaiting pt response.

## 2024-07-02 ENCOUNTER — TELEPHONE (OUTPATIENT)
Dept: HEALTH INFORMATION MANAGEMENT | Facility: OTHER | Age: 84
End: 2024-07-02
Payer: MEDICARE

## 2024-07-02 PROCEDURE — RXMED WILLOW AMBULATORY MEDICATION CHARGE: Performed by: NURSE PRACTITIONER

## 2024-07-09 PROBLEM — I48.0 HYPERCOAGULABLE STATE DUE TO PAROXYSMAL ATRIAL FIBRILLATION (HCC): Status: ACTIVE | Noted: 2024-07-09

## 2024-07-09 PROBLEM — D68.69 HYPERCOAGULABLE STATE DUE TO PAROXYSMAL ATRIAL FIBRILLATION (HCC): Status: ACTIVE | Noted: 2024-07-09

## 2024-07-17 ENCOUNTER — TELEPHONE (OUTPATIENT)
Dept: CARDIOLOGY | Facility: MEDICAL CENTER | Age: 84
End: 2024-07-17
Payer: MEDICARE

## 2024-07-19 ENCOUNTER — PHARMACY VISIT (OUTPATIENT)
Dept: PHARMACY | Facility: MEDICAL CENTER | Age: 84
End: 2024-07-19
Payer: COMMERCIAL

## 2024-08-06 ENCOUNTER — PATIENT MESSAGE (OUTPATIENT)
Dept: CARDIOLOGY | Facility: MEDICAL CENTER | Age: 84
End: 2024-08-06
Payer: MEDICARE

## 2024-08-06 ENCOUNTER — TELEPHONE (OUTPATIENT)
Dept: CARDIOLOGY | Facility: MEDICAL CENTER | Age: 84
End: 2024-08-06
Payer: MEDICARE

## 2024-08-06 NOTE — TELEPHONE ENCOUNTER
Message  Received: Today  LESIA Diaz R.N.; Soheila Lilly R.N.  Dear Kathleen,    If she is asymptomatic, no need for further intervention.    Thank you,  Jose Miguel.          Cardiac Event Monitor  ----------------------------------------------------------------------------------    Attempted to call pt, LVM and sent [employer] message.

## 2024-08-09 ENCOUNTER — OFFICE VISIT (OUTPATIENT)
Dept: INTERNAL MEDICINE | Facility: IMAGING CENTER | Age: 84
End: 2024-08-09
Payer: MEDICARE

## 2024-08-09 ENCOUNTER — HOSPITAL ENCOUNTER (OUTPATIENT)
Facility: MEDICAL CENTER | Age: 84
End: 2024-08-09
Attending: FAMILY MEDICINE
Payer: MEDICARE

## 2024-08-09 ENCOUNTER — HOSPITAL ENCOUNTER (OUTPATIENT)
Facility: MEDICAL CENTER | Age: 84
End: 2024-08-09
Attending: INTERNAL MEDICINE
Payer: MEDICARE

## 2024-08-09 VITALS
OXYGEN SATURATION: 96 % | SYSTOLIC BLOOD PRESSURE: 122 MMHG | RESPIRATION RATE: 17 BRPM | TEMPERATURE: 98.7 F | WEIGHT: 174.8 LBS | BODY MASS INDEX: 29.84 KG/M2 | HEART RATE: 67 BPM | DIASTOLIC BLOOD PRESSURE: 62 MMHG | HEIGHT: 64 IN

## 2024-08-09 DIAGNOSIS — E55.9 VITAMIN D DEFICIENCY: Chronic | ICD-10-CM

## 2024-08-09 DIAGNOSIS — E11.65 CONTROLLED TYPE 2 DIABETES MELLITUS WITH HYPERGLYCEMIA, WITHOUT LONG-TERM CURRENT USE OF INSULIN (HCC): ICD-10-CM

## 2024-08-09 DIAGNOSIS — R53.83 OTHER FATIGUE: ICD-10-CM

## 2024-08-09 DIAGNOSIS — E11.65 TYPE 2 DIABETES MELLITUS WITH HYPERGLYCEMIA, WITHOUT LONG-TERM CURRENT USE OF INSULIN (HCC): ICD-10-CM

## 2024-08-09 DIAGNOSIS — E78.5 DYSLIPIDEMIA: ICD-10-CM

## 2024-08-09 DIAGNOSIS — I10 ESSENTIAL HYPERTENSION: ICD-10-CM

## 2024-08-09 DIAGNOSIS — R22.31 MASS OF RIGHT AXILLA: ICD-10-CM

## 2024-08-09 DIAGNOSIS — D75.89 MACROCYTOSIS: ICD-10-CM

## 2024-08-09 DIAGNOSIS — I10 PRIMARY HYPERTENSION: ICD-10-CM

## 2024-08-09 DIAGNOSIS — N18.31 STAGE 3A CHRONIC KIDNEY DISEASE: ICD-10-CM

## 2024-08-09 LAB
25(OH)D3 SERPL-MCNC: 33 NG/ML (ref 30–100)
ANION GAP SERPL CALC-SCNC: 11 MMOL/L (ref 7–16)
BASOPHILS # BLD AUTO: 0.6 % (ref 0–1.8)
BASOPHILS # BLD: 0.04 K/UL (ref 0–0.12)
BUN SERPL-MCNC: 23 MG/DL (ref 8–22)
CALCIUM SERPL-MCNC: 9.7 MG/DL (ref 8.5–10.5)
CHLORIDE SERPL-SCNC: 104 MMOL/L (ref 96–112)
CHOLEST SERPL-MCNC: 117 MG/DL (ref 100–199)
CO2 SERPL-SCNC: 24 MMOL/L (ref 20–33)
CREAT SERPL-MCNC: 1.02 MG/DL (ref 0.5–1.4)
CREAT UR-MCNC: 101.62 MG/DL
EOSINOPHIL # BLD AUTO: 0.26 K/UL (ref 0–0.51)
EOSINOPHIL NFR BLD: 3.6 % (ref 0–6.9)
ERYTHROCYTE [DISTWIDTH] IN BLOOD BY AUTOMATED COUNT: 48.3 FL (ref 35.9–50)
EST. AVERAGE GLUCOSE BLD GHB EST-MCNC: 128 MG/DL
FOLATE SERPL-MCNC: 10.2 NG/ML
GFR SERPLBLD CREATININE-BSD FMLA CKD-EPI: 54 ML/MIN/1.73 M 2
GLUCOSE SERPL-MCNC: 113 MG/DL (ref 65–99)
HBA1C MFR BLD: 6.1 % (ref 4–5.6)
HCT VFR BLD AUTO: 45 % (ref 37–47)
HDLC SERPL-MCNC: 53 MG/DL
HGB BLD-MCNC: 14.5 G/DL (ref 12–16)
IMM GRANULOCYTES # BLD AUTO: 0.02 K/UL (ref 0–0.11)
IMM GRANULOCYTES NFR BLD AUTO: 0.3 % (ref 0–0.9)
LDLC SERPL CALC-MCNC: 40 MG/DL
LYMPHOCYTES # BLD AUTO: 2.44 K/UL (ref 1–4.8)
LYMPHOCYTES NFR BLD: 34 % (ref 22–41)
MCH RBC QN AUTO: 30.5 PG (ref 27–33)
MCHC RBC AUTO-ENTMCNC: 32.2 G/DL (ref 32.2–35.5)
MCV RBC AUTO: 94.7 FL (ref 81.4–97.8)
MICROALBUMIN UR-MCNC: <1.2 MG/DL
MICROALBUMIN/CREAT UR: NORMAL MG/G (ref 0–30)
MONOCYTES # BLD AUTO: 0.54 K/UL (ref 0–0.85)
MONOCYTES NFR BLD AUTO: 7.5 % (ref 0–13.4)
NEUTROPHILS # BLD AUTO: 3.88 K/UL (ref 1.82–7.42)
NEUTROPHILS NFR BLD: 54 % (ref 44–72)
NRBC # BLD AUTO: 0 K/UL
NRBC BLD-RTO: 0 /100 WBC (ref 0–0.2)
PLATELET # BLD AUTO: 228 K/UL (ref 164–446)
PMV BLD AUTO: 10.8 FL (ref 9–12.9)
POTASSIUM SERPL-SCNC: 3.9 MMOL/L (ref 3.6–5.5)
RBC # BLD AUTO: 4.75 M/UL (ref 4.2–5.4)
SODIUM SERPL-SCNC: 139 MMOL/L (ref 135–145)
T4 FREE SERPL-MCNC: 1.12 NG/DL (ref 0.93–1.7)
TRIGL SERPL-MCNC: 118 MG/DL (ref 0–149)
TSH SERPL-ACNC: 1.26 UIU/ML (ref 0.35–5.5)
VIT B12 SERPL-MCNC: 574 PG/ML (ref 211–911)
WBC # BLD AUTO: 7.2 K/UL (ref 4.8–10.8)

## 2024-08-09 PROCEDURE — 80048 BASIC METABOLIC PNL TOTAL CA: CPT

## 2024-08-09 PROCEDURE — 84439 ASSAY OF FREE THYROXINE: CPT

## 2024-08-09 PROCEDURE — 82607 VITAMIN B-12: CPT

## 2024-08-09 PROCEDURE — 80061 LIPID PANEL: CPT

## 2024-08-09 PROCEDURE — 82306 VITAMIN D 25 HYDROXY: CPT

## 2024-08-09 PROCEDURE — 83036 HEMOGLOBIN GLYCOSYLATED A1C: CPT

## 2024-08-09 PROCEDURE — 3078F DIAST BP <80 MM HG: CPT | Performed by: FAMILY MEDICINE

## 2024-08-09 PROCEDURE — 85025 COMPLETE CBC W/AUTO DIFF WBC: CPT

## 2024-08-09 PROCEDURE — 84443 ASSAY THYROID STIM HORMONE: CPT

## 2024-08-09 PROCEDURE — 99214 OFFICE O/P EST MOD 30 MIN: CPT | Performed by: FAMILY MEDICINE

## 2024-08-09 PROCEDURE — 82570 ASSAY OF URINE CREATININE: CPT

## 2024-08-09 PROCEDURE — 82043 UR ALBUMIN QUANTITATIVE: CPT

## 2024-08-09 PROCEDURE — 82746 ASSAY OF FOLIC ACID SERUM: CPT

## 2024-08-09 PROCEDURE — 3074F SYST BP LT 130 MM HG: CPT | Performed by: FAMILY MEDICINE

## 2024-08-09 ASSESSMENT — FIBROSIS 4 INDEX: FIB4 SCORE: 1.51

## 2024-08-11 NOTE — PROGRESS NOTES
Chief Complaint   Patient presents with    Bump     Bump under right armpit x 2-3 weeks ago. Denies pain in area, she reports it has not changed in size.       HPI:  Patient is a 83 y.o. female established patient who presents today to have fasting labs drawn and for evaluation of new right axilla mass. She first noticed the non tender mass approximately 2-3 weeks ago, denies provoking event, and denies recent illness.     Patient Active Problem List    Diagnosis Date Noted    Hypercoagulable state due to paroxysmal atrial fibrillation (HCC) 07/09/2024    Anticoagulated 06/27/2024    Obesity due to excess calories with serious comorbidity 06/19/2024    Paroxysmal atrial fibrillation (HCC) 06/19/2024    Breast cyst, left 11/01/2023    Risk for falls 08/14/2023    Allergic rhinitis 08/14/2023    Chronic pain of left knee 06/21/2023    OAB (overactive bladder) 02/07/2023    Post-nasal drip 02/07/2023    Lumbar foraminal stenosis 02/07/2023    Chronic back pain 02/07/2023    Stage 3a chronic kidney disease 07/10/2022    Controlled type 2 diabetes mellitus with hyperglycemia, without long-term current use of insulin (HCC) 07/10/2022    SHIRA (obstructive sleep apnea) 06/21/2021    Epidermal cyst 10/16/2018    Seborrheic keratosis 10/16/2018    Microscopic colitis 09/05/2017    Essential hypertension 12/01/2015    Mixed hyperlipidemia with apolipoprotein E2 variant 04/09/2015    Insulin resistance 04/09/2015    Metabolic syndrome 04/09/2015    Sinus bradycardia 03/16/2015    Eczema 10/14/2014    Osteoporosis, post-menopausal 10/14/2014    Environmental allergies 04/18/2014    Vitamin D deficiency disease 11/13/2013    Dupuytren's contracture of both hands 05/22/2013    Degenerative joint disease        Past medical, surgical, family, and social history was reviewed and updated in Epic chart by me today.     Medications and allergies reviewed and updated in Epic chart by me today.     ROS:  Pertinent positives listed above  "in HPI. All other systems have been reviewed and are negative.    PE:   /62 (BP Location: Left arm, Patient Position: Sitting, BP Cuff Size: Adult)   Pulse 67   Temp 37.1 °C (98.7 °F) (Temporal)   Resp 17   Ht 1.626 m (5' 4\")   Wt 79.3 kg (174 lb 12.8 oz)   LMP 01/01/2002   SpO2 96%   BMI 30.00 kg/m²   Vital signs reviewed with patient.     Gen: Well developed; well nourished; no acute distress; age appropriate appearance   R axilla: soft mass present that appears to be associated with tail of right breast; non tender; no overlying skin changes; no associated right breast changes; no right supraclavicular adenopathy  No similar changes in left axilla with b/l comparison  Skin: Warm and dry; no rashes noted   Neuro: No focal deficits noted   Psych: AAOx4; mood and affect are appropriate    A/P:  1. Mass of right axilla  New issue for patient present for the past 2-3 weeks as detailed above in HPI. Recommend patient proceed with US and diagnostic mammo as indicated per radiology protocol. I will follow up with patient when imaging results are available for further management.   - US-BREAST LIMITED-RIGHT; Future  - MA-DIAGNOSTIC MAMMO RIGHT W/TOMOSYNTHESIS W/CAD; Future    2. Essential hypertension  - CBC WITH DIFFERENTIAL; Future    3. Vitamin D deficiency disease  - VITAMIN D,25 HYDROXY (DEFICIENCY); Future    4. Macrocytosis  - VITAMIN B12; Future  - FOLATE; Future    5. Stage 3a chronic kidney disease  - MICROALBUMIN CREAT RATIO URINE; Future    6. Controlled type 2 diabetes mellitus with hyperglycemia, without long-term current use of insulin (HCC)  - HEMOGLOBIN A1C; Future    7. Other fatigue  - TSH; Future  - FREE THYROXINE; Future            "

## 2024-08-19 ENCOUNTER — HOSPITAL ENCOUNTER (OUTPATIENT)
Dept: RADIOLOGY | Facility: MEDICAL CENTER | Age: 84
End: 2024-08-19
Attending: FAMILY MEDICINE
Payer: MEDICARE

## 2024-08-19 DIAGNOSIS — R22.31 MASS OF RIGHT AXILLA: ICD-10-CM

## 2024-08-19 PROCEDURE — 76642 ULTRASOUND BREAST LIMITED: CPT | Mod: RT

## 2024-08-19 PROCEDURE — G0279 TOMOSYNTHESIS, MAMMO: HCPCS | Mod: RT

## 2024-08-22 NOTE — TELEPHONE ENCOUNTER
Marcelo Del Real,    Marcella is out of the office today, however, I do know about Kacie and her , Angelito's prescriptions. I have called Kacie and left a voicemail message letting her know that I was calling on behalf of Marcella regarding their prescriptions. I provided Kacie with my direct phone number to call me back, and Marcella's phone number is forwarded to mine today in case she calls Marcella.    Thank you!    Shaista LACEY  Rx Coordinator

## 2024-09-04 ENCOUNTER — PATIENT MESSAGE (OUTPATIENT)
Dept: HEALTH INFORMATION MANAGEMENT | Facility: OTHER | Age: 84
End: 2024-09-04

## 2024-09-09 ENCOUNTER — OFFICE VISIT (OUTPATIENT)
Dept: CARDIOLOGY | Facility: MEDICAL CENTER | Age: 84
End: 2024-09-09
Attending: INTERNAL MEDICINE
Payer: MEDICARE

## 2024-09-09 VITALS
WEIGHT: 176.4 LBS | BODY MASS INDEX: 30.11 KG/M2 | OXYGEN SATURATION: 97 % | HEART RATE: 74 BPM | HEIGHT: 64 IN | RESPIRATION RATE: 16 BRPM | DIASTOLIC BLOOD PRESSURE: 62 MMHG | SYSTOLIC BLOOD PRESSURE: 136 MMHG

## 2024-09-09 DIAGNOSIS — I10 PRIMARY HYPERTENSION: ICD-10-CM

## 2024-09-09 DIAGNOSIS — D68.69 HYPERCOAGULABLE STATE DUE TO PAROXYSMAL ATRIAL FIBRILLATION (HCC): ICD-10-CM

## 2024-09-09 DIAGNOSIS — I48.0 PAROXYSMAL ATRIAL FIBRILLATION (HCC): ICD-10-CM

## 2024-09-09 DIAGNOSIS — N18.31 STAGE 3A CHRONIC KIDNEY DISEASE: ICD-10-CM

## 2024-09-09 DIAGNOSIS — E11.65 TYPE 2 DIABETES MELLITUS WITH HYPERGLYCEMIA, WITHOUT LONG-TERM CURRENT USE OF INSULIN (HCC): ICD-10-CM

## 2024-09-09 DIAGNOSIS — I48.0 HYPERCOAGULABLE STATE DUE TO PAROXYSMAL ATRIAL FIBRILLATION (HCC): ICD-10-CM

## 2024-09-09 DIAGNOSIS — I83.813 VARICOSE VEINS OF BOTH LOWER EXTREMITIES WITH PAIN: ICD-10-CM

## 2024-09-09 DIAGNOSIS — E78.5 DYSLIPIDEMIA: ICD-10-CM

## 2024-09-09 LAB — EKG IMPRESSION: NORMAL

## 2024-09-09 PROCEDURE — 99214 OFFICE O/P EST MOD 30 MIN: CPT | Performed by: INTERNAL MEDICINE

## 2024-09-09 PROCEDURE — 3075F SYST BP GE 130 - 139MM HG: CPT | Performed by: INTERNAL MEDICINE

## 2024-09-09 PROCEDURE — 93005 ELECTROCARDIOGRAM TRACING: CPT | Performed by: INTERNAL MEDICINE

## 2024-09-09 PROCEDURE — RXMED WILLOW AMBULATORY MEDICATION CHARGE: Performed by: INTERNAL MEDICINE

## 2024-09-09 PROCEDURE — G2211 COMPLEX E/M VISIT ADD ON: HCPCS | Performed by: INTERNAL MEDICINE

## 2024-09-09 PROCEDURE — 99212 OFFICE O/P EST SF 10 MIN: CPT | Performed by: INTERNAL MEDICINE

## 2024-09-09 PROCEDURE — 93010 ELECTROCARDIOGRAM REPORT: CPT | Performed by: INTERNAL MEDICINE

## 2024-09-09 PROCEDURE — 3078F DIAST BP <80 MM HG: CPT | Performed by: INTERNAL MEDICINE

## 2024-09-09 RX ORDER — NEBIVOLOL 10 MG/1
10 TABLET ORAL DAILY
Qty: 90 TABLET | Refills: 4 | Status: SHIPPED | OUTPATIENT
Start: 2024-09-09

## 2024-09-09 ASSESSMENT — ENCOUNTER SYMPTOMS
VOMITING: 0
HALLUCINATIONS: 0
FEVER: 0
MYALGIAS: 0
SPEECH CHANGE: 0
BLURRED VISION: 0
LOSS OF CONSCIOUSNESS: 0
SENSORY CHANGE: 0
DIZZINESS: 0
DEPRESSION: 0
COUGH: 0
EYE PAIN: 0
PND: 0
CLAUDICATION: 0
ORTHOPNEA: 0
PALPITATIONS: 0
WEIGHT LOSS: 0
ABDOMINAL PAIN: 0
HEADACHES: 0
FALLS: 0
CHILLS: 0
NAUSEA: 0
BRUISES/BLEEDS EASILY: 0
SHORTNESS OF BREATH: 0
DOUBLE VISION: 0
EYE DISCHARGE: 0
BLOOD IN STOOL: 0

## 2024-09-09 ASSESSMENT — FIBROSIS 4 INDEX: FIB4 SCORE: 1.79

## 2024-09-09 NOTE — PROGRESS NOTES
Chief Complaint   Patient presents with    Hypertension    Atrial Fibrillation    Hyperlipidemia       Subjective     Kacie Rubalcava is an 84 y.o. female who presents today for uncontrolled HTN. Been happening more erratic after her colonoscopy.    Kacie Rubalcava does not have any history of heart attack arrhythmias in the past. she never had transthoracic echocardiogram, cardiac catheterization or ablations procedure in the past. At this time, she denies having chest pain shortness of breath presyncopal syncopal episodes. she is able to exercise with walking for one to 2 miles without having problems of chest pain or shortness of breath. Patient is also able to climb up at least 2 flights of stairs without having symptoms.    I have independently interpreted and reviewed echocardiogram's actual images with patient which showed normal left ventricular systolic function. No wall motion abnormality. No evidence of pulmonary hypertension. No significant valvular disease.    I have personally interpreted EKG today with patient, there is no evidence of acute coronary syndrome, no evidence of prior infarct, normal [place] and QT interval, no significant conduction disease. Sinus bradycardia.    I have independently interpreted and reviewed blood tests results with patient in clinic which shows normal LDL level 40, triglycerides 118, GFR of 54 at baseline, K of 3.9. hgba1c of 6.1.    Bystolic prescription did not go through with pharmacy.    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 at SURGERY SAME DAY [facility] ORS    DILATION AND CURETTAGE  3/13/2015    Performed by Alexandria Keenan M.D. at SURGERY SAME DAY [facility]    BREAST BIOPSY  11/13/08    [facility] ISRAEL HICKS [facility]    COLON RESECTION  2000    polyps    APPENDECTOMY      OTHER NEUROLOGICAL SURG      TONSILLECTOMY      US-NEEDLE CORE BX-BREAST PANEL       Family History   Problem Relation Age of Onset    Lung Disease Mother     Cancer Mother         lung cancer    Cancer Paternal Aunt         breast     Social History     Socioeconomic History    Marital status: Single     Spouse name: Not on file    Number of children: Not on file    Years of education: Not on file    Highest education level: Not on file   Occupational History    Not on file   Tobacco Use    Smoking status: Never    Smokeless tobacco: Never   Vaping Use    Vaping status: Never Used   Substance and Sexual Activity    Alcohol use: Yes     Comment: Occ    Drug use: No    Sexual activity: Yes     Partners: Male   Other Topics Concern    Not on file   Social History Narrative    Not on file     Social Determinants of Health     Financial Resource Strain: Not on file   Food Insecurity: No Food Insecurity (6/19/2024)    Hunger Vital Sign     Worried About Running Out of Food in the Last Year: Never true     Ran Out of Food in the Last Year: Never true   Transportation Needs: No Transportation Needs (6/19/2024)    PRAPARE - Transportation     Lack of Transportation (Medical): No     Lack of Transportation (Non-Medical): No   Physical Activity: Not on file   Stress: Not on file   Social Connections: Not on file   Intimate Partner Violence: Not At Risk (6/19/2024)    Humiliation, Afraid, Rape, and Kick questionnaire     Fear of Current or Ex-Partner: No     Emotionally Abused: No      Physically Abused: No     Sexually Abused: No   Housing Stability: Low Risk  (6/19/2024)    Housing Stability Vital Sign     Unable to Pay for Housing in the Last Year: No     Number of Places Lived in the Last Year: 1     Unstable Housing in the Last Year: No     Allergies   Allergen Reactions    Food Anaphylaxis and Vomiting     mussels    Sulfa Drugs Anaphylaxis    Augmentin Vomiting    Nifedipine Swelling     Leg edema     Outpatient Encounter Medications as of 9/9/2024   Medication Sig Dispense Refill    nebivolol (BYSTOLIC) 10 MG Tab Take 1 Tablet by mouth every day. 90 Tablet 4    apixaban (ELIQUIS) 5mg Tab Take 1 Tablet by mouth 2 times a day. Indications: Thromboembolism secondary to Atrial Fibrillation 180 Tablet 3    Empagliflozin (JARDIANCE) 10 MG Tab tablet Take 1 Tablet by mouth every day. 90 Tablet 3    valsartan (DIOVAN) 320 MG tablet Take 1 Tablet by mouth every day. 100 Tablet 4    rosuvastatin (CRESTOR) 10 MG Tab Take 1 Tablet by mouth every evening. 100 Tablet 4    CORAL CALCIUM PO Take 1 Tablet by mouth every evening. 60 Each     [DISCONTINUED] Polyethyl Glycol-Propyl Glycol (SYSTANE OP) Administer 1 Drop into both eyes every 2 hours. For dry eyes      [DISCONTINUED] Fluticasone Propionate (XHANCE) 93 MCG/ACT Exhaler Suspension Instill 1 spray in each nostril twice daily (Patient not taking: Reported on 6/25/2024) 60 mL 11     No facility-administered encounter medications on file as of 9/9/2024.     Review of Systems   Constitutional:  Negative for chills, fever, malaise/fatigue and weight loss.   HENT:  Negative for ear discharge, ear pain, hearing loss and nosebleeds.    Eyes:  Negative for blurred vision, double vision, pain and discharge.   Respiratory:  Negative for cough and shortness of breath.    Cardiovascular:  Negative for chest pain, palpitations, orthopnea, claudication, leg swelling and PND.   Gastrointestinal:  Negative for abdominal pain, blood in stool, melena, nausea and  "vomiting.   Genitourinary:  Negative for dysuria and hematuria.   Musculoskeletal:  Negative for falls, joint pain and myalgias.   Skin:  Negative for itching and rash.   Neurological:  Negative for dizziness, sensory change, speech change, loss of consciousness and headaches.   Endo/Heme/Allergies:  Negative for environmental allergies. Does not bruise/bleed easily.   Psychiatric/Behavioral:  Negative for depression, hallucinations and suicidal ideas.               Objective     /62 (BP Location: Left arm, Patient Position: Sitting, BP Cuff Size: Adult)   Pulse 74   Resp 16   Ht 1.626 m (5' 4\")   Wt 80 kg (176 lb 6.4 oz)   LMP 01/01/2002   SpO2 97%   BMI 30.28 kg/m²     Physical Exam  Vitals and nursing note reviewed.   Constitutional:       General: She is not in acute distress.     Appearance: She is not diaphoretic.   HENT:      Head: Normocephalic and atraumatic.      Right Ear: External ear normal.      Left Ear: External ear normal.      Nose: No congestion or rhinorrhea.   Eyes:      General:         Right eye: No discharge.         Left eye: No discharge.   Neck:      Thyroid: No thyromegaly.      Vascular: No JVD.   Cardiovascular:      Rate and Rhythm: Normal rate and regular rhythm.      Pulses: Normal pulses.   Pulmonary:      Effort: No respiratory distress.   Abdominal:      General: There is no distension.      Tenderness: There is no abdominal tenderness.   Musculoskeletal:         General: No swelling or tenderness.      Right lower leg: No edema.      Left lower leg: No edema.      Comments: + varicose veins without evidence of ulcer, discoloration.     Skin:     General: Skin is warm and dry.   Neurological:      Mental Status: She is alert and oriented to person, place, and time.      Cranial Nerves: No cranial nerve deficit.   Psychiatric:         Behavior: Behavior normal.                Assessment & Plan     1. Paroxysmal atrial fibrillation (HCC)  EKG      2. Primary " hypertension  nebivolol (BYSTOLIC) 10 MG Tab      3. Hypercoagulable state due to paroxysmal atrial fibrillation (HCC)        4. Type 2 diabetes mellitus with hyperglycemia, without long-term current use of insulin (HCC)        5. Stage 3a chronic kidney disease        6. Varicose veins of both lower extremities with pain  US-EXTREMITY VENOUS LOWER BILAT      7. Dyslipidemia  Basic Metabolic Panel    LIPID PANEL            Medical Decision Making: Today's Assessment/Status/Plan:   Blood pressure is extremely elevated today. No TIA or stroke at this time. Patient is asymptomatic, thus, no need for ER visit nor hospitalization. I suspect there is some desensitization to her current HTN medications as she has been taking them for many years.    Continue Valsartan 320 mg daily, Bystolic 10 mg daily.    Continue Rosuvastatin 10 mg daily.    We will also check lower extremities venous duplex to assess the anatomy for her lower extremities venous system.     Patient is not in acute coronary syndrome presentation, is not having any active TIA or stroke symptoms, as not having decompensated heart failure, is not symptomatic in terms of presyncope pain or syncope. No evidence of significant valvular disease.    I explained to patient that further cardiac testing or procedures will not lower her overall cardiovascular risk for the surgery.  Patient remains low to moderate cardiovascular risk for her intended surgery of eyelids and dental works.      Ita Joseph M.D.

## 2024-09-19 ENCOUNTER — OFFICE VISIT (OUTPATIENT)
Dept: INTERNAL MEDICINE | Facility: IMAGING CENTER | Age: 84
End: 2024-09-19
Payer: MEDICARE

## 2024-09-19 VITALS
RESPIRATION RATE: 17 BRPM | BODY MASS INDEX: 30.05 KG/M2 | DIASTOLIC BLOOD PRESSURE: 60 MMHG | HEIGHT: 64 IN | TEMPERATURE: 97.8 F | SYSTOLIC BLOOD PRESSURE: 122 MMHG | HEART RATE: 50 BPM | OXYGEN SATURATION: 97 % | WEIGHT: 176 LBS

## 2024-09-19 DIAGNOSIS — E78.2 MIXED HYPERLIPIDEMIA WITH APOLIPOPROTEIN E2 VARIANT: Chronic | ICD-10-CM

## 2024-09-19 DIAGNOSIS — M54.42 CHRONIC BILATERAL LOW BACK PAIN WITH LEFT-SIDED SCIATICA: ICD-10-CM

## 2024-09-19 DIAGNOSIS — R00.1 SINUS BRADYCARDIA: Chronic | ICD-10-CM

## 2024-09-19 DIAGNOSIS — I48.0 PAROXYSMAL ATRIAL FIBRILLATION (HCC): ICD-10-CM

## 2024-09-19 DIAGNOSIS — Z00.00 ENCOUNTER FOR MEDICARE ANNUAL WELLNESS EXAM: ICD-10-CM

## 2024-09-19 DIAGNOSIS — E55.9 VITAMIN D DEFICIENCY: Chronic | ICD-10-CM

## 2024-09-19 DIAGNOSIS — K52.838 OTHER MICROSCOPIC COLITIS: ICD-10-CM

## 2024-09-19 DIAGNOSIS — N32.81 OAB (OVERACTIVE BLADDER): ICD-10-CM

## 2024-09-19 DIAGNOSIS — M48.061 LUMBAR FORAMINAL STENOSIS: ICD-10-CM

## 2024-09-19 DIAGNOSIS — G89.29 CHRONIC PAIN OF LEFT KNEE: ICD-10-CM

## 2024-09-19 DIAGNOSIS — D68.69 HYPERCOAGULABLE STATE DUE TO PAROXYSMAL ATRIAL FIBRILLATION (HCC): ICD-10-CM

## 2024-09-19 DIAGNOSIS — I48.0 HYPERCOAGULABLE STATE DUE TO PAROXYSMAL ATRIAL FIBRILLATION (HCC): ICD-10-CM

## 2024-09-19 DIAGNOSIS — I10 ESSENTIAL HYPERTENSION: Chronic | ICD-10-CM

## 2024-09-19 DIAGNOSIS — Z79.01 ANTICOAGULATED: ICD-10-CM

## 2024-09-19 DIAGNOSIS — M25.562 CHRONIC PAIN OF LEFT KNEE: ICD-10-CM

## 2024-09-19 DIAGNOSIS — G47.33 OSA (OBSTRUCTIVE SLEEP APNEA): Chronic | ICD-10-CM

## 2024-09-19 DIAGNOSIS — Z23 NEED FOR VACCINATION: ICD-10-CM

## 2024-09-19 DIAGNOSIS — M81.0 OSTEOPOROSIS, POST-MENOPAUSAL: Chronic | ICD-10-CM

## 2024-09-19 DIAGNOSIS — G89.29 CHRONIC BILATERAL LOW BACK PAIN WITH LEFT-SIDED SCIATICA: ICD-10-CM

## 2024-09-19 DIAGNOSIS — E11.65 CONTROLLED TYPE 2 DIABETES MELLITUS WITH HYPERGLYCEMIA, WITHOUT LONG-TERM CURRENT USE OF INSULIN (HCC): ICD-10-CM

## 2024-09-19 DIAGNOSIS — N18.31 STAGE 3A CHRONIC KIDNEY DISEASE: ICD-10-CM

## 2024-09-19 PROBLEM — L82.1 SEBORRHEIC KERATOSIS: Status: RESOLVED | Noted: 2018-10-16 | Resolved: 2024-09-19

## 2024-09-19 PROBLEM — R09.82 POST-NASAL DRIP: Status: RESOLVED | Noted: 2023-02-07 | Resolved: 2024-09-19

## 2024-09-19 PROBLEM — Z91.81 RISK FOR FALLS: Status: RESOLVED | Noted: 2023-08-14 | Resolved: 2024-09-19

## 2024-09-19 PROBLEM — E66.09 OBESITY DUE TO EXCESS CALORIES WITH SERIOUS COMORBIDITY: Status: RESOLVED | Noted: 2024-06-19 | Resolved: 2024-09-19

## 2024-09-19 PROCEDURE — RXMED WILLOW AMBULATORY MEDICATION CHARGE: Performed by: NURSE PRACTITIONER

## 2024-09-19 PROCEDURE — 3074F SYST BP LT 130 MM HG: CPT | Performed by: FAMILY MEDICINE

## 2024-09-19 PROCEDURE — 90662 IIV NO PRSV INCREASED AG IM: CPT | Performed by: FAMILY MEDICINE

## 2024-09-19 PROCEDURE — 3078F DIAST BP <80 MM HG: CPT | Performed by: FAMILY MEDICINE

## 2024-09-19 PROCEDURE — G0008 ADMIN INFLUENZA VIRUS VAC: HCPCS | Performed by: FAMILY MEDICINE

## 2024-09-19 PROCEDURE — G0439 PPPS, SUBSEQ VISIT: HCPCS | Mod: 25 | Performed by: FAMILY MEDICINE

## 2024-09-19 ASSESSMENT — ACTIVITIES OF DAILY LIVING (ADL): BATHING_REQUIRES_ASSISTANCE: 0

## 2024-09-19 ASSESSMENT — FIBROSIS 4 INDEX: FIB4 SCORE: 1.79

## 2024-09-19 ASSESSMENT — ENCOUNTER SYMPTOMS: GENERAL WELL-BEING: GOOD

## 2024-09-19 ASSESSMENT — PATIENT HEALTH QUESTIONNAIRE - PHQ9: CLINICAL INTERPRETATION OF PHQ2 SCORE: 0

## 2024-09-19 NOTE — PROGRESS NOTES
CC:   Medicare Annual Wellness Visit    HPI:  Kacie is a 84 y.o. female here for her Medicare Annual Wellness Visit and to discuss current health concerns.     Patient Active Problem List    Diagnosis Date Noted    Hypercoagulable state due to paroxysmal atrial fibrillation (HCC) 07/09/2024    Anticoagulated 06/27/2024    Paroxysmal atrial fibrillation (HCC) 06/19/2024    Breast cyst, left 11/01/2023    Allergic rhinitis 08/14/2023    Chronic pain of left knee 06/21/2023    OAB (overactive bladder) 02/07/2023    Lumbar foraminal stenosis 02/07/2023    Chronic back pain 02/07/2023    Stage 3a chronic kidney disease 07/10/2022    Controlled type 2 diabetes mellitus with hyperglycemia, without long-term current use of insulin ([facility]) 07/10/2022    BMI 30.0-30.9,adult 07/08/2022    SHIRA (obstructive sleep apnea) 06/21/2021    Epidermal cyst 10/16/2018    Microscopic colitis 09/05/2017    Essential hypertension 12/01/2015    Mixed hyperlipidemia with apolipoprotein E2 variant 04/09/2015    Sinus bradycardia 03/16/2015    Eczema 10/14/2014    Osteoporosis, post-menopausal 10/14/2014    Environmental allergies 04/18/2014    Vitamin D deficiency disease 11/13/2013    Dupuytren's contracture of both hands 05/22/2013    Degenerative joint disease      Current Outpatient Medications   Medication Sig Dispense Refill    nebivolol (BYSTOLIC) 10 MG Tab Take 1 Tablet by mouth every day. 90 Tablet 4    apixaban (ELIQUIS) 5mg Tab Take 1 Tablet by mouth 2 times a day. Indications: Thromboembolism secondary to Atrial Fibrillation 180 Tablet 3    Empagliflozin (JARDIANCE) 10 MG Tab tablet Take 1 Tablet by mouth every day. 90 Tablet 3    valsartan (DIOVAN) 320 MG tablet Take 1 Tablet by mouth every day. 100 Tablet 4    rosuvastatin (CRESTOR) 10 MG Tab Take 1 Tablet by mouth every evening. 100 Tablet 4    CORAL CALCIUM PO Take 1 Tablet by mouth every evening. 60 Each      No current facility-administered medications for this visit.       Current supplements: see MAR   Chronic narcotic pain medicines: no  Allergies: Food, Sulfa drugs, Augmentin, and Nifedipine  Exercise: as able  Current social contact/activities: yes  Current mood: good  Advance Directive on file: no    Screening:  Depression Screening  Little interest or pleasure in doing things?  0 - not at all  Feeling down, depressed , or hopeless? 0 - not at all  Patient Health Questionnaire Score: 0     If depressive symptoms identified deferred to follow up visit unless specifically addressed in assessment and plan.    Interpretation of PHQ-9 Total Score   Score Severity   1-4 No Depression   5-9 Mild Depression   10-14 Moderate Depression   15-19 Moderately Severe Depression   20-27 Severe Depression    Screening for Cognitive Impairment  Do you or any of your friends or family members have any concern about your memory? No  Three Minute Recall (Leader, Season, Table) 3/3    Omkar clock face with all 12 numbers and set the hands to show 10 minutes after 11.  Yes    Cognitive concerns identified deferred for follow up unless specifically addressed in assessment and plan.    Fall Risk Assessment  Has the patient had two or more falls in the last year or any fall with injury in the last year?  No    Safety Assessment  Do you always wear your seatbelt?  Yes  Any changes to home needed to function safely? No  Difficulty hearing.  No  Patient counseled about all safety risks that were identified.    Functional Assessment ADLs  Are there any barriers preventing you from cooking for yourself or meeting nutritional needs?  No.    Are there any barriers preventing you from driving safely or obtaining transportation?  No.    Are there any barriers preventing you from using a telephone or calling for help?  No    Are there any barriers preventing you from shopping?  No.    Are there any barriers preventing you from taking care of your own finances?  No    Are there any barriers preventing you from  managing your medications?  No    Are there any barriers preventing you from showering, bathing or dressing yourself? No    Are there any barriers preventing you from doing housework or laundry? No  Are there any barriers preventing you from using the toilet?No  Are you currently engaging in any exercise or physical activity?  Yes.      Self-Assessment of Health  What is your perception of your health? Good    Do you sleep more than six hours a night? Yes    In the past 7 days, how much did pain keep you from doing your normal work? None    Do you spend quality time with family or friends (virtually or in person)? Yes    Do you usually eat a heart healthy diet that constists of a variety of fruits, vegetables, whole grains and fiber? Yes    Do you eat foods high in fat and/or Fast Food more than three times per week? No    How concerned are you that your medical conditions are not being well managed? Not at all    Are you worried that in the next 2 months, you may not have stable housing that you own, rent, or stay in as part of a household?        Advance Care Planning  Do you have an Advance Directive, Living Will, Durable Power of , or POLST?                   Health Maintenance Summary            Overdue - Diabetes: Retinopathy Screening (Yearly) Overdue since 8/23/2024 08/23/2023  RETINAL SCREENING RESULTS              Ordered - Influenza Vaccine (1) Ordered on 9/19/2024 09/09/2023  Imm Admin: Influenza Vaccine Adult HD    11/04/2022  Imm Admin: Influenza Vaccine Adult HD    11/01/2021  Imm Admin: Influenza Vaccine Adult HD    10/02/2020  Imm Admin: Influenza Vaccine Adult HD    09/16/2019  Imm Admin: Influenza Vaccine Adult HD    Only the first 5 history entries have been loaded, but more history exists.              A1c Screening (Every 6 Months) Next due on 2/9/2025 08/09/2024  HEMOGLOBIN A1C    03/01/2024  POCT  A1C    10/31/2023  POCT  A1C    07/27/2023  HEMOGLOBIN A1C    11/11/2022   HEMOGLOBIN A1C    Only the first 5 history entries have been loaded, but more history exists.              Ordered - Fasting Lipid Profile (Yearly) Ordered on 9/9/2024 08/09/2024  Lipid Profile    02/22/2024  Lipid Profile    07/27/2023  Lipid Profile    11/11/2022  Lipid Profile    06/15/2022  Lipid Profile    Only the first 5 history entries have been loaded, but more history exists.              Diabetes: Urine Protein Screening (Yearly) Next due on 8/9/2025 08/09/2024  MICROALBUMIN CREAT RATIO URINE    07/27/2023  MICROALBUMIN CREAT RATIO URINE    06/15/2022  MICROALBUMIN CREAT RATIO URINE    06/15/2021  MICROALBUMIN CREAT RATIO URINE    05/05/2020  MICROALBUMIN CREAT RATIO URINE    Only the first 5 history entries have been loaded, but more history exists.              Ordered - SERUM CREATININE (Yearly) Ordered on 9/9/2024 08/09/2024  Basic Metabolic Panel    06/19/2024  Complete Metabolic Panel (CMP)    06/19/2024  Comp Metabolic Panel (CMP)    04/03/2024  Basic Metabolic Panel    02/22/2024  Basic Metabolic Panel    Only the first 5 history entries have been loaded, but more history exists.              Mammogram (Yearly) Next due on 8/19/2025 08/19/2024  MA-DIAGNOSTIC MAMMO RIGHT W/TOMOSYNTHESIS W/CAD    10/11/2023  MA-SCREENING MAMMO BILAT W/TOMOSYNTHESIS W/CAD    10/07/2022  MA-SCREENING MAMMO BILAT W/TOMOSYNTHESIS W/CAD    08/02/2021  MA-SCREENING MAMMO BILAT W/TOMOSYNTHESIS W/CAD    12/17/2018  MA-SCREENING MAMMO BILAT W/TOMOSYNTHESIS W/CAD    Only the first 5 history entries have been loaded, but more history exists.              Annual Wellness Visit (Yearly) Next due on 9/19/2025 09/19/2024  Visit Dx: Encounter for Medicare annual wellness exam    09/19/2024  Subsequent Annual Wellness Visit - Includes PPPS ()    08/14/2023  Visit Dx: Encounter for Medicare annual wellness exam    08/14/2023  Subsequent Annual Wellness Visit - Includes PPPS ()    07/10/2022   Subsequent Annual Wellness Visit - Includes PPPS ()    Only the first 5 history entries have been loaded, but more history exists.              Bone Density Scan (Every 2 Years) Next due on 10/11/2025      10/11/2023  DS-BONE DENSITY STUDY (DEXA)    12/02/2019  DS-BONE DENSITY STUDY (DEXA)    10/22/2014  DS-BONE DENSITY STUDY (DEXA)    10/19/2012  DS-BONE DENSITY STUDY (DEXA)    02/18/2010  DS-BONE DENSITY STUDY (DEXA)    Only the first 5 history entries have been loaded, but more history exists.              IMM DTaP/Tdap/Td Vaccine (3 - Td or Tdap) Next due on 7/27/2033 07/27/2023  Imm Admin: Tdap Vaccine    05/22/2013  Imm Admin: Tdap Vaccine              Pneumococcal Vaccine: 65+ Years (Series Information) Completed      12/08/2014  Imm Admin: Pneumococcal Conjugate Vaccine (Prevnar/PCV-13)    11/13/2010  Imm Admin: Pneumococcal polysaccharide vaccine (PPSV-23)    01/01/2006  Imm Admin: Pneumococcal polysaccharide vaccine (PPSV-23)              Zoster (Shingles) Vaccines (Series Information) Completed      11/02/2018  Imm Admin: Zoster Vaccine Recombinant (RZV) (SHINGRIX)    08/08/2018  Imm Admin: Zoster Vaccine Recombinant (RZV) (SHINGRIX)    12/10/2008  Imm Admin: Zoster Vaccine Live (ZVL) (Zostavax) - HISTORICAL DATA    11/13/2007  Imm Admin: Zoster Vaccine Live (ZVL) (Zostavax) - HISTORICAL DATA              COVID-19 Vaccine (Series Information) Completed      09/12/2024  Imm Admin: Comirnaty (Covid-19 Vaccine, Mrna, 7706-6095 Formula)    04/25/2024  Imm Admin: Comirnaty (Covid-19 Vaccine, Mrna, [id number] Formula)    09/27/2023  Imm Admin: Covid-19 Mrna (Spikevax) Moderna 12+ Years    10/13/2022  Imm Admin: MODERNA BIVALENT BOOSTER SARS-COV-2 VACCINE (6+)    12/16/2021  Imm Admin: PFIZER PURPLE CAP SARS-COV-2 VACCINATION (12+)    Only the first 5 history entries have been loaded, but more history exists.              Hepatitis A Vaccine (Hep A) (Series Information) Aged Out      No completion  history exists for this topic.              HPV Vaccines (Series Information) Aged Out      No completion history exists for this topic.              Polio Vaccine (Inactivated Polio) (Series Information) Aged Out      No completion history exists for this topic.              Meningococcal Immunization (Series Information) Aged Out      No completion history exists for this topic.              Discontinued - Colorectal Cancer Screening  Discontinued        Frequency changed to Never automatically (Topic No Longer Applies)    11/16/2022  AMB EXTERNAL COLONOSCOPY RESULTS    11/07/2022  AMB EXTERNAL COLONOSCOPY RESULTS    09/07/2010  REFERRAL TO GI FOR COLONOSCOPY    05/22/2010  Colonoscopy (Done)    Only the first 5 history entries have been loaded, but more history exists.              Discontinued - Hepatitis B Vaccine (Hep B)  Discontinued      No completion history exists for this topic.              Discontinued - Diabetes: Monofilament / LE Exam  Discontinued      No completion history exists for this topic.                    Patient Care Team:  Ally Barroso M.D. as PCP - General (Family Medicine)  Ally Barroso M.D. as PCP - [facility] Paneled (Family Medicine)  [facility] as Respiratory Therapist  Irlanda Sanches R.N.      Social History     Tobacco Use    Smoking status: Never    Smokeless tobacco: Never   Vaping Use    Vaping status: Never Used   Substance Use Topics    Alcohol use: Yes     Comment: Occ    Drug use: No     Family History   Problem Relation Age of Onset    Lung Disease Mother     Cancer Mother         lung cancer    Cancer Paternal Aunt         breast     She  has a past medical history of Anxiety, Arthritis (03/12/15), Chickenpox, Degenerative joint disease, Epidermal cyst (10/16/2018), [place] measles, High cholesterol, HTN (hypertension), benign, Hyperlipemia, Hypertension, Microscopic colitis (2010), Neck pain on left side (5/31/2017), Osteoporosis, Other specified symptom  "associated with female genital organs, Seborrheic keratosis (10/16/2018), and Snoring.    She has no past medical history of Anesthesia, Cold, or Dental disorder.   Past Surgical History:   Procedure Laterality Date    HYSTEROSCOPY WITH VIDEO DIAGNOSTIC  3/13/2015    Performed by Alexandria Keenan M.D. at SURGERY SAME DAY [facility]    DILATION AND CURETTAGE  3/13/2015    Performed by Alexandria Keenan M.D. at SURGERY SAME DAY [facility]    BREAST BIOPSY  11/13/08    Performed by ISRAEL HICKS at SURGERY SAME DAY [facility] X3    COLON RESECTION  2000    polyps    APPENDECTOMY      OTHER NEUROLOGICAL SURG      TONSILLECTOMY      US-NEEDLE CORE BX-BREAST PANEL         ROS:    All positives noted in HPI. All others reviewed and are negative.    Ostomy or other tubes or amputations: no  Chronic oxygen use: no  Last eye exam: UTD  : denies urinary incontinence; does not interfere with ADLs/ sleep  Gait: stable  Problems with balance/ difficulty walking: not currently  Hearing: adequate/ has hearing aids at home but one does not stay in her ear - different style coming  Dentition: adequate    Exam:   /60 (BP Location: Left arm, Patient Position: Sitting, BP Cuff Size: Adult)   Pulse (!) 50   Temp 36.6 °C (97.8 °F) (Temporal)   Resp 17   Ht 1.626 m (5' 4\")   Wt 79.8 kg (176 lb)   SpO2 97%  Body mass index is 30.21 kg/m².    Gen: Well developed; well nourished; no acute distress; age appropriate appearance   HEENT: Normocephalic; atraumatic; PEERLA b/l; sclera clear b/l; b/l external auditory canals WNL; b/l TM WNL; nares patent; oropharynx clear; oral mucosa moist; tongue midline; dentition adequate   Neck: No adenopathy; no thyromegaly  CV: Regular rate and rhythm; S1/ S2 present; no murmur, gallop or rub noted  Pulm: No respiratory distress; clear to ascultation b/l; no wheezing or stridor noted b/l  Abd: Adequate bowel sounds noted; soft and nontender; no rebound, rigidity, nor " distention  Extremities: No peripheral edema b/l LE extremities/ no clubbing nor cyanosis noted  Skin: Warm and dry; no rashes noted   Neuro: No focal deficits noted; pt is able to get up out of chair unassisted and walk forward  Psych: AAOx4; mood and affect are appropriate    Assessment and Plan:  1. Controlled type 2 diabetes mellitus with hyperglycemia, without long-term current use of insulin (HCC)  Stable/ recommend patient continue ADA diet, regular movement as able, and continue Jardiance 10 mg daily use. Patient is UTD with retinal exam at [facility], and Josefina QUISPE will request report for our records.   - Subsequent Annual Wellness Visit - Includes PPPS ()    2. Need for vaccination  Vaccine administered at visit today.  - INFLUENZA VACCINE, HIGH DOSE (65+ ONLY)  - Subsequent Annual Wellness Visit - Includes PPPS ()    3. Chronic pain of left knee  Ongoing issue for patient - she is not interested in orthopedic surgery and would like to see Dr. Echavarria for evaluation and treatment. New referral made to [facility].   - Referral to Pain Management  - Subsequent Annual Wellness Visit - Includes PPPS ()    4. Chronic bilateral low back pain with left-sided sciatica  Ongoing issue for patient - she is not interested in spine surgery and would like to see Dr. Echavarria for evaluation and treatment. New referral made to [facility]. She has been experiencing left leg numbness/tingling (thigh) when laying down in bed on her back.   - Referral to Pain Management  - Subsequent Annual Wellness Visit - Includes PPPS ()    5. Lumbar foraminal stenosis  Ongoing issue for patient that is also affecting bottoms of her feet - refer to #4  - Referral to Pain Management  - Subsequent Annual Wellness Visit - Includes PPPS ()    6. Essential hypertension  Stable/ chronic condition managed by Dr. Joseph and patient is compliant with Diovan 320 mg daily use.   - Subsequent  Annual Wellness Visit - Includes PPPS ()    7. Vitamin D deficiency disease  Stable  - Subsequent Annual Wellness Visit - Includes PPPS ()    8. Stage 3a chronic kidney disease  Stable/ patient is careful to avoid nephrotoxins as able and remains well hydrated  - Subsequent Annual Wellness Visit - Includes PPPS ()    9. Sinus bradycardia  Stable  - Subsequent Annual Wellness Visit - Includes PPPS ()    10. Paroxysmal atrial fibrillation (HCC)  Stable/ chronic condition managed by Dr. Joseph and patient is compliant with Bystolic 10 mg daily use.   - Subsequent Annual Wellness Visit - Includes PPPS ()    11. Anticoagulated  Stable/ patient tolerates Eliquis 5 mg BID use managed by Dr. Joseph  - Subsequent Annual Wellness Visit - Includes PPPS ()    12. Hypercoagulable state due to paroxysmal atrial fibrillation (HCC)  Chronic condition managed by Dr. Joseph  - Subsequent Annual Wellness Visit - Includes PPPS ()    13. Mixed hyperlipidemia with apolipoprotein E2 variant  Chronic condition managed by Dr. Joseph, and patient is compliant with Crestor 10 mg nightly use.   - Subsequent Annual Wellness Visit - Includes PPPS ()    14. Other microscopic colitis  Chronic condition managed by [facility]  - Subsequent Annual Wellness Visit - Includes PPPS ()    15. OAB (overactive bladder)  Stable without current medication use  - Subsequent Annual Wellness Visit - Includes PPPS ()    16. SHIRA (obstructive sleep apnea)  Stable/ chronic condition managed by [facility]  - Subsequent Annual Wellness Visit - Includes PPPS ()    17. Osteoporosis, post-menopausal  Stable  - Subsequent Annual Wellness Visit - Includes PPPS ()    18. BMI 30.0-30.9,adult  Stable  - Subsequent Annual Wellness Visit - Includes PPPS ()    19. Encounter for Medicare annual wellness exam  Patient remains well versed about medical conditions that need additional attention moving forward.   - Subsequent Annual  Wellness Visit - Includes PPPS ()       Services needed: no new services needed at this time  Health Care Screening: recommendations as per orders if indicated.  Referrals offered: Pain Management  Counseling provided today:  Prevent falls and reduce trip hazards; Secure or remove rugs if present   Maintain working fire alarm and carbon monoxide detectors   Engage in regular physical activity daily and social activities weekly as tolerated   F/U with me every three months/ PRN sooner as new needs arise

## 2024-09-20 ENCOUNTER — PHARMACY VISIT (OUTPATIENT)
Dept: PHARMACY | Facility: MEDICAL CENTER | Age: 84
End: 2024-09-20
Payer: COMMERCIAL

## 2024-10-08 PROCEDURE — RXMED WILLOW AMBULATORY MEDICATION CHARGE: Performed by: NURSE PRACTITIONER

## 2024-10-10 ENCOUNTER — PHARMACY VISIT (OUTPATIENT)
Dept: PHARMACY | Facility: MEDICAL CENTER | Age: 84
End: 2024-10-10
Payer: COMMERCIAL

## 2024-10-10 ENCOUNTER — OFFICE VISIT (OUTPATIENT)
Dept: SLEEP MEDICINE | Facility: MEDICAL CENTER | Age: 84
End: 2024-10-10
Attending: NURSE PRACTITIONER
Payer: MEDICARE

## 2024-10-10 VITALS
WEIGHT: 173 LBS | HEART RATE: 53 BPM | DIASTOLIC BLOOD PRESSURE: 84 MMHG | HEIGHT: 65 IN | RESPIRATION RATE: 16 BRPM | BODY MASS INDEX: 28.82 KG/M2 | OXYGEN SATURATION: 97 % | SYSTOLIC BLOOD PRESSURE: 124 MMHG

## 2024-10-10 DIAGNOSIS — R09.89 RUNNY NOSE: ICD-10-CM

## 2024-10-10 DIAGNOSIS — G47.33 OSA (OBSTRUCTIVE SLEEP APNEA): Chronic | ICD-10-CM

## 2024-10-10 DIAGNOSIS — Z78.9 NONSMOKER: ICD-10-CM

## 2024-10-10 PROCEDURE — 3074F SYST BP LT 130 MM HG: CPT | Performed by: NURSE PRACTITIONER

## 2024-10-10 PROCEDURE — 99214 OFFICE O/P EST MOD 30 MIN: CPT | Performed by: NURSE PRACTITIONER

## 2024-10-10 PROCEDURE — 99213 OFFICE O/P EST LOW 20 MIN: CPT | Performed by: NURSE PRACTITIONER

## 2024-10-10 PROCEDURE — 3079F DIAST BP 80-89 MM HG: CPT | Performed by: NURSE PRACTITIONER

## 2024-10-10 ASSESSMENT — FIBROSIS 4 INDEX: FIB4 SCORE: 1.79

## 2024-10-15 PROCEDURE — RXMED WILLOW AMBULATORY MEDICATION CHARGE: Performed by: PHYSICAL MEDICINE & REHABILITATION

## 2024-10-16 ENCOUNTER — PHARMACY VISIT (OUTPATIENT)
Dept: PHARMACY | Facility: MEDICAL CENTER | Age: 84
End: 2024-10-16
Payer: COMMERCIAL

## 2024-10-16 PROCEDURE — RXMED WILLOW AMBULATORY MEDICATION CHARGE: Performed by: OPTOMETRIST

## 2024-10-16 RX ORDER — CYCLOSPORINE 0.5 MG/ML
1 EMULSION OPHTHALMIC 2 TIMES DAILY
Qty: 180 EACH | Refills: 3 | OUTPATIENT
Start: 2024-09-05

## 2024-11-11 PROCEDURE — RXMED WILLOW AMBULATORY MEDICATION CHARGE: Performed by: INTERNAL MEDICINE

## 2024-11-12 ENCOUNTER — PHARMACY VISIT (OUTPATIENT)
Dept: PHARMACY | Facility: MEDICAL CENTER | Age: 84
End: 2024-11-12
Payer: COMMERCIAL

## 2024-11-12 ENCOUNTER — HOME STUDY (OUTPATIENT)
Dept: SLEEP MEDICINE | Facility: MEDICAL CENTER | Age: 84
End: 2024-11-12
Attending: NURSE PRACTITIONER
Payer: MEDICARE

## 2024-11-12 DIAGNOSIS — G47.33 OSA (OBSTRUCTIVE SLEEP APNEA): Chronic | ICD-10-CM

## 2024-11-12 PROCEDURE — 94762 N-INVAS EAR/PLS OXIMTRY CONT: CPT | Performed by: STUDENT IN AN ORGANIZED HEALTH CARE EDUCATION/TRAINING PROGRAM

## 2024-11-12 PROCEDURE — 94762 N-INVAS EAR/PLS OXIMTRY CONT: CPT | Performed by: NURSE PRACTITIONER

## 2024-11-12 PROCEDURE — RXMED WILLOW AMBULATORY MEDICATION CHARGE: Performed by: PHYSICAL MEDICINE & REHABILITATION

## 2024-11-12 RX ORDER — GABAPENTIN 300 MG/1
300 CAPSULE ORAL 3 TIMES DAILY
Qty: 90 CAPSULE | Refills: 2 | OUTPATIENT
Start: 2024-11-12

## 2024-11-17 NOTE — PROCEDURES
This is an overnight oximetry study performed on November 12, 2024 for duration of 7 hours and 42 minutes on autoCPAP [id number].     Basal SpO2 was 90.4%.  Total time spent below saturation of 88% was 40.5 minutes .  The only period of desaturation was towards the end of the test and may have represented that patient taking off the CPAP mask; should clarify with patient. If patient wore mask entire night then treatment appears inadequate and formal PAP titration study should be considered. If patient took mask off towards end of night, then treatment appears effective.

## 2024-11-20 ENCOUNTER — PHARMACY VISIT (OUTPATIENT)
Dept: PHARMACY | Facility: MEDICAL CENTER | Age: 84
End: 2024-11-20
Payer: COMMERCIAL

## 2024-11-21 ENCOUNTER — APPOINTMENT (RX ONLY)
Dept: URBAN - METROPOLITAN AREA CLINIC 22 | Facility: CLINIC | Age: 84
Setting detail: DERMATOLOGY
End: 2024-11-21

## 2024-11-21 DIAGNOSIS — D22 MELANOCYTIC NEVI: ICD-10-CM

## 2024-11-21 DIAGNOSIS — D18.0 HEMANGIOMA: ICD-10-CM

## 2024-11-21 DIAGNOSIS — Z71.89 OTHER SPECIFIED COUNSELING: ICD-10-CM

## 2024-11-21 DIAGNOSIS — L81.4 OTHER MELANIN HYPERPIGMENTATION: ICD-10-CM

## 2024-11-21 DIAGNOSIS — L82.1 OTHER SEBORRHEIC KERATOSIS: ICD-10-CM

## 2024-11-21 PROBLEM — D18.01 HEMANGIOMA OF SKIN AND SUBCUTANEOUS TISSUE: Status: ACTIVE | Noted: 2024-11-21

## 2024-11-21 PROBLEM — D22.5 MELANOCYTIC NEVI OF TRUNK: Status: ACTIVE | Noted: 2024-11-21

## 2024-11-21 PROCEDURE — ? COUNSELING

## 2024-11-21 PROCEDURE — 99213 OFFICE O/P EST LOW 20 MIN: CPT

## 2024-11-21 PROCEDURE — ? SUNSCREEN TREATMENT REGIMEN

## 2024-11-21 ASSESSMENT — LOCATION DETAILED DESCRIPTION DERM
LOCATION DETAILED: LEFT LATERAL ABDOMEN
LOCATION DETAILED: INFERIOR MID FOREHEAD
LOCATION DETAILED: UPPER STERNUM
LOCATION DETAILED: SUPERIOR THORACIC SPINE
LOCATION DETAILED: RIGHT VENTRAL PROXIMAL FOREARM
LOCATION DETAILED: INFERIOR THORACIC SPINE
LOCATION DETAILED: LEFT VENTRAL PROXIMAL FOREARM

## 2024-11-21 ASSESSMENT — LOCATION ZONE DERM
LOCATION ZONE: TRUNK
LOCATION ZONE: FACE
LOCATION ZONE: ARM

## 2024-11-21 ASSESSMENT — LOCATION SIMPLE DESCRIPTION DERM
LOCATION SIMPLE: RIGHT FOREARM
LOCATION SIMPLE: CHEST
LOCATION SIMPLE: UPPER BACK
LOCATION SIMPLE: INFERIOR FOREHEAD
LOCATION SIMPLE: ABDOMEN
LOCATION SIMPLE: LEFT FOREARM

## 2024-12-11 ENCOUNTER — TELEPHONE (OUTPATIENT)
Dept: CARDIOLOGY | Facility: MEDICAL CENTER | Age: 84
End: 2024-12-11

## 2024-12-11 ENCOUNTER — HOSPITAL ENCOUNTER (OUTPATIENT)
Dept: RADIOLOGY | Facility: MEDICAL CENTER | Age: 84
End: 2024-12-11
Attending: INTERNAL MEDICINE
Payer: MEDICARE

## 2024-12-11 DIAGNOSIS — I83.813 VARICOSE VEINS OF BOTH LOWER EXTREMITIES WITH PAIN: ICD-10-CM

## 2024-12-11 PROCEDURE — 93970 EXTREMITY STUDY: CPT

## 2024-12-11 PROCEDURE — 93970 EXTREMITY STUDY: CPT | Mod: 26 | Performed by: INTERNAL MEDICINE

## 2024-12-12 NOTE — RESULT ENCOUNTER NOTE
Dear team,    Can you please let Kacie Rubalcava know that result is not entirely normal and I will see patient as scheduled with me in clinic to discuss? Nothing of major concern at this time. Continue compression stockings.    Thank you,  Jose Miguel.

## 2024-12-12 NOTE — TELEPHONE ENCOUNTER
Message  Received: Today  LESIA Diaz, R.N.; Chasidy Smith RGEORGIE.; Nasima Zacarias RGEORGIE.  Dear team,    Can you please let Kacie Rubalcava know that result is not entirely normal and I will see patient as scheduled with me in clinic to discuss? Nothing of major concern at this time. Continue compression stockings.    Thank you,  Jose Miguel.          -EXTREMITY VENOUS LOWER BILAT  --------------------------------------------------------------    See MyChart message

## 2024-12-15 ENCOUNTER — PATIENT MESSAGE (OUTPATIENT)
Dept: SLEEP MEDICINE | Facility: MEDICAL CENTER | Age: 84
End: 2024-12-15
Payer: MEDICARE

## 2024-12-18 PROCEDURE — RXMED WILLOW AMBULATORY MEDICATION CHARGE: Performed by: NURSE PRACTITIONER

## 2024-12-18 PROCEDURE — RXMED WILLOW AMBULATORY MEDICATION CHARGE: Performed by: PHYSICAL MEDICINE & REHABILITATION

## 2024-12-20 ENCOUNTER — PHARMACY VISIT (OUTPATIENT)
Dept: PHARMACY | Facility: MEDICAL CENTER | Age: 84
End: 2024-12-20
Payer: COMMERCIAL

## 2024-12-26 PROCEDURE — RXMED WILLOW AMBULATORY MEDICATION CHARGE: Performed by: OPTOMETRIST

## 2024-12-30 ENCOUNTER — PHARMACY VISIT (OUTPATIENT)
Dept: PHARMACY | Facility: MEDICAL CENTER | Age: 84
End: 2024-12-30
Payer: COMMERCIAL

## 2024-12-30 PROCEDURE — RXMED WILLOW AMBULATORY MEDICATION CHARGE: Performed by: INTERNAL MEDICINE

## 2025-01-10 PROCEDURE — RXMED WILLOW AMBULATORY MEDICATION CHARGE: Performed by: NURSE PRACTITIONER

## 2025-01-13 ENCOUNTER — PHARMACY VISIT (OUTPATIENT)
Dept: PHARMACY | Facility: MEDICAL CENTER | Age: 85
End: 2025-01-13
Payer: COMMERCIAL

## 2025-01-21 ENCOUNTER — TELEPHONE (OUTPATIENT)
Dept: PHARMACY | Facility: MEDICAL CENTER | Age: 85
End: 2025-01-21
Payer: MEDICARE

## 2025-01-21 NOTE — TELEPHONE ENCOUNTER
Jardiance Tablet 10 MG    PA no longer req'd - medication or product is on your plan's list of covered drugs. Prior authorization is not required at this time, TC paid copay $117.50 #90/90 DS, will have liaison r/o to offer our services.

## 2025-01-27 ENCOUNTER — OFFICE VISIT (OUTPATIENT)
Dept: CARDIOLOGY | Facility: MEDICAL CENTER | Age: 85
End: 2025-01-27
Attending: INTERNAL MEDICINE
Payer: MEDICARE

## 2025-01-27 ENCOUNTER — TELEPHONE (OUTPATIENT)
Dept: CARDIOLOGY | Facility: MEDICAL CENTER | Age: 85
End: 2025-01-27

## 2025-01-27 VITALS
DIASTOLIC BLOOD PRESSURE: 80 MMHG | SYSTOLIC BLOOD PRESSURE: 129 MMHG | BODY MASS INDEX: 31.18 KG/M2 | RESPIRATION RATE: 16 BRPM | OXYGEN SATURATION: 96 % | HEIGHT: 64 IN | WEIGHT: 182.6 LBS | HEART RATE: 52 BPM

## 2025-01-27 DIAGNOSIS — N18.31 STAGE 3A CHRONIC KIDNEY DISEASE: ICD-10-CM

## 2025-01-27 DIAGNOSIS — E78.00 PURE HYPERCHOLESTEROLEMIA: ICD-10-CM

## 2025-01-27 DIAGNOSIS — I10 HTN (HYPERTENSION), MALIGNANT: ICD-10-CM

## 2025-01-27 DIAGNOSIS — I83.812 VARICOSE VEINS OF LEFT LOWER EXTREMITY WITH PAIN: ICD-10-CM

## 2025-01-27 DIAGNOSIS — E11.65 TYPE 2 DIABETES MELLITUS WITH HYPERGLYCEMIA, WITHOUT LONG-TERM CURRENT USE OF INSULIN (HCC): ICD-10-CM

## 2025-01-27 DIAGNOSIS — I48.0 HYPERCOAGULABLE STATE DUE TO PAROXYSMAL ATRIAL FIBRILLATION (HCC): ICD-10-CM

## 2025-01-27 DIAGNOSIS — I48.0 PAROXYSMAL ATRIAL FIBRILLATION (HCC): ICD-10-CM

## 2025-01-27 DIAGNOSIS — D68.69 HYPERCOAGULABLE STATE DUE TO PAROXYSMAL ATRIAL FIBRILLATION (HCC): ICD-10-CM

## 2025-01-27 LAB — EKG IMPRESSION: NORMAL

## 2025-01-27 PROCEDURE — 99212 OFFICE O/P EST SF 10 MIN: CPT | Performed by: INTERNAL MEDICINE

## 2025-01-27 PROCEDURE — G2211 COMPLEX E/M VISIT ADD ON: HCPCS | Performed by: INTERNAL MEDICINE

## 2025-01-27 PROCEDURE — 93010 ELECTROCARDIOGRAM REPORT: CPT | Performed by: INTERNAL MEDICINE

## 2025-01-27 PROCEDURE — 3074F SYST BP LT 130 MM HG: CPT | Performed by: INTERNAL MEDICINE

## 2025-01-27 PROCEDURE — 93005 ELECTROCARDIOGRAM TRACING: CPT | Mod: TC | Performed by: INTERNAL MEDICINE

## 2025-01-27 PROCEDURE — 3079F DIAST BP 80-89 MM HG: CPT | Performed by: INTERNAL MEDICINE

## 2025-01-27 PROCEDURE — 99214 OFFICE O/P EST MOD 30 MIN: CPT | Performed by: INTERNAL MEDICINE

## 2025-01-27 RX ORDER — DIPHENOXYLATE HYDROCHLORIDE AND ATROPINE SULFATE 2.5; .025 MG/1; MG/1
1 TABLET ORAL 4 TIMES DAILY PRN
COMMUNITY

## 2025-01-27 RX ORDER — FINERENONE 10 MG/1
10 TABLET, FILM COATED ORAL DAILY
Qty: 90 TABLET | Refills: 3 | Status: SHIPPED | OUTPATIENT
Start: 2025-01-27

## 2025-01-27 ASSESSMENT — ENCOUNTER SYMPTOMS
SPEECH CHANGE: 0
PALPITATIONS: 0
DIZZINESS: 0
CHILLS: 0
BLOOD IN STOOL: 0
BLURRED VISION: 0
ABDOMINAL PAIN: 0
VOMITING: 0
EYE PAIN: 0
FALLS: 0
HALLUCINATIONS: 0
DEPRESSION: 0
HEADACHES: 0
LOSS OF CONSCIOUSNESS: 0
NAUSEA: 0
SENSORY CHANGE: 0
EYE DISCHARGE: 0
CLAUDICATION: 0
DOUBLE VISION: 0
FEVER: 0
ORTHOPNEA: 0
MYALGIAS: 0
BRUISES/BLEEDS EASILY: 0
SHORTNESS OF BREATH: 0
PND: 0
COUGH: 0
WEIGHT LOSS: 0

## 2025-01-27 ASSESSMENT — FIBROSIS 4 INDEX: FIB4 SCORE: 1.79

## 2025-01-27 NOTE — PROGRESS NOTES
Chief Complaint   Patient presents with    Atrial Fibrillation     F/V Dx: Paroxysmal atrial fibrillation (HCC)    Hypertension     F/V Dx: HTN (hypertension), malignant    Hyperlipidemia     F/V Dx: Mixed hyperlipidemia with apolipoprotein E2 variant       Subjective     Kacie Rubalcava is an 84 y.o. female who presents today for uncontrolled HTN. Been happening more erratic after her colonoscopy.    Kacie Rubalcava does not have any history of heart attack arrhythmias in the past. she never had transthoracic echocardiogram, cardiac catheterization or ablations procedure in the past. At this time, she denies having chest pain shortness of breath presyncopal syncopal episodes. she is able to exercise with walking for one to 2 miles without having problems of chest pain or shortness of breath. Patient is also able to climb up at least 2 flights of stairs without having symptoms.    I have independently interpreted and reviewed echocardiogram's actual images with patient which showed normal left ventricular systolic function. No wall motion abnormality. No evidence of pulmonary hypertension. No significant valvular disease.    I have personally interpreted EKG today with patient, there is no evidence of acute coronary syndrome, no evidence of prior infarct, normal VA and QT interval, no significant conduction disease. Sinus bradycardia.    12/2024 I personally interpreted the venous duplex ultrasound which showed significant reflux disease venous insufficiency seen in bilateral great saphenous vein(s).  I personally reviewed the images with patient in clinic today as well.    I have independently interpreted and reviewed blood tests results with patient in clinic which shows normal LDL level 40, triglycerides 118, GFR of 54 at baseline, K of 3.9. hgba1c of 6.1.    Bystolic prescription did not go through with pharmacy.    Past Medical History:   Diagnosis Date    Anxiety     Arthritis 03/12/15    generalized     Chickenpox     Degenerative joint disease     Epidermal cyst 10/16/2018    Martiniquais measles     High cholesterol     HTN (hypertension), benign     Hyperlipemia     Hypertension     Microscopic colitis 2010    Neck pain on left side 5/31/2017    Osteoporosis     Other specified symptom associated with female genital organs     post menopausal bleeding    Seborrheic keratosis 10/16/2018    Snoring      Past Surgical History:   Procedure Laterality Date    HYSTEROSCOPY WITH VIDEO DIAGNOSTIC  3/13/2015    Performed by Alexandria Keenan M.D. at [facility]    DILATION AND CURETTAGE  3/13/2015    Performed by Alexandria Keenan M.D. at [facility]    BREAST BIOPSY  11/13/08    Performed by ISRAEL HICKS at [facility] X3    COLON RESECTION  2000    polyps    APPENDECTOMY      OTHER NEUROLOGICAL SURG      TONSILLECTOMY      US-NEEDLE CORE BX-BREAST PANEL       Family History   Problem Relation Age of Onset    Lung Disease Mother     Cancer Mother         lung cancer    Cancer Paternal Aunt         breast     Social History     Socioeconomic History    Marital status: Single     Spouse name: Not on file    Number of children: Not on file    Years of education: Not on file    Highest education level: Not on file   Occupational History    Not on file   Tobacco Use    Smoking status: Never    Smokeless tobacco: Never   Vaping Use    Vaping status: Never Used   Substance and Sexual Activity    Alcohol use: Yes     Comment: Occ    Drug use: No    Sexual activity: Yes     Partners: Male   Other Topics Concern    Not on file   Social History Narrative    Not on file     Social Drivers of Health     Financial Resource Strain: Not on file   Food Insecurity: No Food Insecurity (6/19/2024)    Hunger Vital Sign     Worried About Running Out of Food in the Last Year: Never true     Ran Out of Food in the Last Year: Never true   Transportation Needs: No Transportation Needs  (6/19/2024)    PRAPARE - Transportation     Lack of Transportation (Medical): No     Lack of Transportation (Non-Medical): No   Physical Activity: Not on file   Stress: Not on file   Social Connections: Not on file   Intimate Partner Violence: Not At Risk (6/19/2024)    Humiliation, Afraid, Rape, and Kick questionnaire     Fear of Current or Ex-Partner: No     Emotionally Abused: No     Physically Abused: No     Sexually Abused: No   Housing Stability: Low Risk  (6/19/2024)    Housing Stability Vital Sign     Unable to Pay for Housing in the Last Year: No     Number of Places Lived in the Last Year: 1     Unstable Housing in the Last Year: No     Allergies   Allergen Reactions    Food Anaphylaxis and Vomiting     mussels    Sulfa Drugs Anaphylaxis    Augmentin Vomiting    Nifedipine Swelling     Leg edema     Outpatient Encounter Medications as of 1/27/2025   Medication Sig Dispense Refill    diphenoxylate-atropine (LOMOTIL) 2.5-0.025 MG Tab Take 1 Tablet by mouth 4 times a day as needed for Diarrhea.      Finerenone (KERENDIA) 10 MG Tab Take 10 mg by mouth every day. 90 Tablet 3    gabapentin (NEURONTIN) 300 MG Cap Take 1 Capsule by mouth in the morning, at noon, and at bedtime. 90 Capsule 2    cycloSPORINE (RESTASIS) 0.05 % ophthalmic emulsion Administer 1 Drop into both eyes 2 times a day. 180 Each 3    nebivolol (BYSTOLIC) 10 MG Tab Take 1 Tablet by mouth every day. 90 Tablet 4    apixaban (ELIQUIS) 5mg Tab Take 1 Tablet by mouth 2 times a day. Indications: Thromboembolism secondary to Atrial Fibrillation 180 Tablet 3    Empagliflozin (JARDIANCE) 10 MG Tab tablet Take 1 Tablet by mouth every day. 90 Tablet 3    valsartan (DIOVAN) 320 MG tablet Take 1 Tablet by mouth every day. 100 Tablet 4    rosuvastatin (CRESTOR) 10 MG Tab Take 1 Tablet by mouth every evening. 100 Tablet 4    CORAL CALCIUM PO Take 1 Tablet by mouth every evening. 60 Each     [DISCONTINUED] Tiotropium Bromide Monohydrate (SPIRIVA RESPIMAT) 1.25  "MCG/ACT Aero Soln Inhale 2 puffs once a day (Patient not taking: Reported on 1/27/2025) 4 g 11    [DISCONTINUED] gabapentin (NEURONTIN) 300 MG Cap Take 1 capsule by mouth at night for 3 days, then 1 cap twice daily for 3 days, then 1 cap 3 times daily thereafter. 90 Capsule 1     No facility-administered encounter medications on file as of 1/27/2025.     Review of Systems   Constitutional:  Negative for chills, fever, malaise/fatigue and weight loss.   HENT:  Negative for ear discharge, ear pain, hearing loss and nosebleeds.    Eyes:  Negative for blurred vision, double vision, pain and discharge.   Respiratory:  Negative for cough and shortness of breath.    Cardiovascular:  Negative for chest pain, palpitations, orthopnea, claudication, leg swelling and PND.   Gastrointestinal:  Negative for abdominal pain, blood in stool, melena, nausea and vomiting.   Genitourinary:  Negative for dysuria and hematuria.   Musculoskeletal:  Negative for falls, joint pain and myalgias.   Skin:  Negative for itching and rash.   Neurological:  Negative for dizziness, sensory change, speech change, loss of consciousness and headaches.   Endo/Heme/Allergies:  Negative for environmental allergies. Does not bruise/bleed easily.   Psychiatric/Behavioral:  Negative for depression, hallucinations and suicidal ideas.               Objective     /80 (BP Location: Left arm, Patient Position: Sitting, BP Cuff Size: Adult)   Pulse (!) 52   Resp 16   Ht 1.638 m (5' 4.49\")   Wt 82.8 kg (182 lb 9.6 oz)   LMP 01/01/2002   SpO2 96%   BMI 30.87 kg/m²     Physical Exam  Vitals and nursing note reviewed.   Constitutional:       General: She is not in acute distress.     Appearance: She is not diaphoretic.   HENT:      Head: Normocephalic and atraumatic.      Right Ear: External ear normal.      Left Ear: External ear normal.      Nose: No congestion or rhinorrhea.   Eyes:      General:         Right eye: No discharge.         Left eye: No " discharge.   Neck:      Thyroid: No thyromegaly.      Vascular: No JVD.   Cardiovascular:      Rate and Rhythm: Normal rate and regular rhythm.      Pulses: Normal pulses.   Pulmonary:      Effort: No respiratory distress.   Abdominal:      General: There is no distension.      Tenderness: There is no abdominal tenderness.   Musculoskeletal:         General: No swelling or tenderness.      Right lower leg: No edema.      Left lower leg: No edema.      Comments: + varicose veins without evidence of ulcer, discoloration.     Skin:     General: Skin is warm and dry.   Neurological:      Mental Status: She is alert and oriented to person, place, and time.      Cranial Nerves: No cranial nerve deficit.   Psychiatric:         Behavior: Behavior normal.                Assessment & Plan     1. Paroxysmal atrial fibrillation (HCC)  EKG      2. Hypercoagulable state due to paroxysmal atrial fibrillation (HCC)        3. HTN (hypertension), malignant        4. Stage 3a chronic kidney disease  Finerenone (KERENDIA) 10 MG Tab      5. Type 2 diabetes mellitus with hyperglycemia, without long-term current use of insulin (HCC)  HEMOGLOBIN A1C    Finerenone (KERENDIA) 10 MG Tab      6. Varicose veins of left lower extremity with pain  US-EXTREMITY VENOUS LOWER UNILAT LEFT    US-VEIN ABLATION VENASEAL UNILATERAL LEFT      7. Pure hypercholesterolemia  Basic Metabolic Panel    LIPID PANEL            Medical Decision Making: Today's Assessment/Status/Plan:     At this time, patient is very symptomatic in terms of varicose veins and venous insufficiency, CEAP of C4A. Patient tried compression stockings for 6 months and had failed such conservative therapy. Therefore, patient is indicated to further undergo vein treatment via Venaseal (which is approved by the FDA a standard of care) of the LEFT greater saphenous vein for treatment of symptomatic venous insufficiency and reflux disease to improve quality of life and symptomatology,  avoiding further progression and potential venous ulcer(s) formation. Risks (including bleeding, infection, deep vein thrombosis, pain, rash, itchiness, skin irritation etc) and benefits were explained to patient and patient has accepted risks and agreed to proceed    Overall, patient was also informed about the possibility of needing vein graft for bypass surgery in the future.  In those cases, patient was informed that after vein treatment, patient would not be able to use the treated great saphenous vein.  Patient has agreed to proceed with such knowledge.    Of note, I also informed patient that the procedure is only done for the indication for symptomatic relief.  We are not doing this procedure for cosmetic purposes.  This follows guidelines and standard of care based on Medicare and FDA approval.    We will submit for insurance approval and proceed to scheduling after insurance approval.    Blood pressure is well controlled. Continue Valsartan 320 mg daily, Bystolic 10 mg daily.    Continue Rosuvastatin 10 mg daily.    Continue anticoagulation with Eliquis 5 mg BID mg BID for treatment of stroke risk reduction. Will closely monitor side effect of systemic bleeding.    She is in sinus rhythm today.    Based on recent FDA approval of Kerendia (finerenone) therapy to reduce the risk of kidney function decline, kidney failure, cardiovascular death, non-fatal heart attacks, and hospitalization for heart failure in adults with chronic kidney disease associated with type 2 diabetes, I will start patient on Kerendia 10 mg daily. Risks and benefits of this therapy were discussed with patient, who has agreed to proceed. We will closely monitor based on protocol for the side effects of this high risk medication with basic metabolic panel in 3 weeks.      Ita Joseph MD.   [facility].

## 2025-01-27 NOTE — TELEPHONE ENCOUNTER
Alfredo Carranza,      Patient was informed you will be reaching out to schedule: US-VEIN ABLATION VENASEAL UNILATERAL LEFT [[id number]]. Ordered per TT, Thank you.

## 2025-01-28 ENCOUNTER — OFFICE VISIT (OUTPATIENT)
Dept: INTERNAL MEDICINE | Facility: IMAGING CENTER | Age: 85
End: 2025-01-28
Payer: MEDICARE

## 2025-01-28 ENCOUNTER — TELEPHONE (OUTPATIENT)
Dept: CARDIOLOGY | Facility: MEDICAL CENTER | Age: 85
End: 2025-01-28
Payer: MEDICARE

## 2025-01-28 ENCOUNTER — HOSPITAL ENCOUNTER (OUTPATIENT)
Dept: RADIOLOGY | Facility: MEDICAL CENTER | Age: 85
End: 2025-01-28
Attending: FAMILY MEDICINE
Payer: MEDICARE

## 2025-01-28 VITALS
TEMPERATURE: 97.9 F | RESPIRATION RATE: 17 BRPM | HEIGHT: 64 IN | HEART RATE: 51 BPM | SYSTOLIC BLOOD PRESSURE: 128 MMHG | DIASTOLIC BLOOD PRESSURE: 68 MMHG | OXYGEN SATURATION: 97 % | BODY MASS INDEX: 31.16 KG/M2 | WEIGHT: 182.54 LBS

## 2025-01-28 DIAGNOSIS — E11.22 TYPE 2 DIABETES MELLITUS WITH STAGE 3A CHRONIC KIDNEY DISEASE, WITHOUT LONG-TERM CURRENT USE OF INSULIN (HCC): ICD-10-CM

## 2025-01-28 DIAGNOSIS — N18.31 TYPE 2 DIABETES MELLITUS WITH STAGE 3A CHRONIC KIDNEY DISEASE, WITHOUT LONG-TERM CURRENT USE OF INSULIN (HCC): ICD-10-CM

## 2025-01-28 DIAGNOSIS — M25.561 ACUTE PAIN OF RIGHT KNEE: ICD-10-CM

## 2025-01-28 PROCEDURE — 73564 X-RAY EXAM KNEE 4 OR MORE: CPT | Mod: RT

## 2025-01-28 PROCEDURE — 3074F SYST BP LT 130 MM HG: CPT | Performed by: FAMILY MEDICINE

## 2025-01-28 PROCEDURE — 99214 OFFICE O/P EST MOD 30 MIN: CPT | Performed by: FAMILY MEDICINE

## 2025-01-28 PROCEDURE — 3078F DIAST BP <80 MM HG: CPT | Performed by: FAMILY MEDICINE

## 2025-01-28 ASSESSMENT — PATIENT HEALTH QUESTIONNAIRE - PHQ9: CLINICAL INTERPRETATION OF PHQ2 SCORE: 0

## 2025-01-28 ASSESSMENT — FIBROSIS 4 INDEX: FIB4 SCORE: 1.79

## 2025-01-28 NOTE — TELEPHONE ENCOUNTER
Received ERX in Cardiology [facility], ran test claim and rejected for Prior Authorization is required. Prior Authorization for Kerendia 10mg tabs (Quantity: 90, Days: 90) has been submitted via Cover My Meds: Key ((Key: [id number])    Insurance: [employer] / [employer] D    Will follow up in 48-72 business hours.

## 2025-01-29 NOTE — PROGRESS NOTES
Chief Complaint   Patient presents with    Pain     1/8/25 she was walking her dog and she needed to lurch to the right side to avoid falling after stepping on a Maple tree pod. Her right side was painful right away, now her pain is localized to behind the right knee and up to her right hip.       HPI:  Patient is a 84 y.o. female established patient who presents today for evaluation of new right leg/knee pain that started after she slipped on a maple tree pod on 1/8/25. She was walking her dog and wrenched entire right side of her body to avoid falling on ground. She is now experiencing lingering right leg (specifically right upper calf and back of knee) pain, and she has tried ice/heat therapy/OTC medication use without relief. Pain also radiates toward her right hip when it is flared up.     Patient Active Problem List    Diagnosis Date Noted    Hypercoagulable state due to paroxysmal atrial fibrillation (HCC) 07/09/2024    Anticoagulated 06/27/2024    Paroxysmal atrial fibrillation (HCC) 06/19/2024    Breast cyst, left 11/01/2023    Allergic rhinitis 08/14/2023    Chronic pain of left knee 06/21/2023    OAB (overactive bladder) 02/07/2023    Lumbar foraminal stenosis 02/07/2023    Chronic back pain 02/07/2023    Stage 3a chronic kidney disease 07/10/2022    Type 2 diabetes mellitus with stage 3a chronic kidney disease, without long-term current use of insulin (HCC) 07/10/2022    BMI 30.0-30.9,adult 07/08/2022    SHIRA (obstructive sleep apnea) 06/21/2021    Epidermal cyst 10/16/2018    Microscopic colitis 09/05/2017    Essential hypertension 12/01/2015    Mixed hyperlipidemia with apolipoprotein E2 variant 04/09/2015    Sinus bradycardia 03/16/2015    Eczema 10/14/2014    Osteoporosis, post-menopausal 10/14/2014    Environmental allergies 04/18/2014    Vitamin D deficiency disease 11/13/2013    Dupuytren's contracture of both hands 05/22/2013    Degenerative joint disease        Past medical, surgical, family, and  "social history was reviewed and updated in Epic chart by me today.     Medications and allergies reviewed and updated in Epic chart by me today.     ROS:  Pertinent positives listed above in HPI. All other systems have been reviewed and are negative.    PE:   /68 (BP Location: Left arm, Patient Position: Sitting, BP Cuff Size: Adult)   Pulse (!) 51   Temp 36.6 °C (97.9 °F) (Temporal)   Resp 17   Ht 1.626 m (5' 4\")   Wt 82.8 kg (182 lb 8.7 oz)   LMP 01/01/2002   SpO2 97%   BMI 31.33 kg/m²   Vital signs reviewed with patient.     Gen: Well developed; well nourished; no acute distress; age appropriate appearance   Extremities: No gross deformity of right knee; discomfort with palpation of right proximal calf as well as in popliteal fossa; no new peripheral edema b/l LE extremities/ no clubbing nor cyanosis noted  Skin: Warm and dry; no rashes noted   Neuro: No focal deficits noted; independent ambulation observed   Psych: AAOx4; mood and affect are appropriate    A/P:  1. Acute pain of right knee  New condition that started on 18/25 as detailed above in HPI. Patient has tried ice and heat therapy as well as OTC pain medication use without relief. Recommend patient obtain xrays of right knee and will make referral to Active PT based on xray results.   - DX-KNEE COMPLETE 4+ RIGHT; Future    2. Type 2 diabetes mellitus with stage 3a chronic kidney disease, without long-term current use of insulin (HCC)  Stable/ repeat labs ordered for ongoing close management.   "

## 2025-02-03 DIAGNOSIS — M25.561 ACUTE PAIN OF RIGHT KNEE: ICD-10-CM

## 2025-02-14 PROCEDURE — RXMED WILLOW AMBULATORY MEDICATION CHARGE: Performed by: INTERNAL MEDICINE

## 2025-02-17 PROCEDURE — RXMED WILLOW AMBULATORY MEDICATION CHARGE: Performed by: INTERNAL MEDICINE

## 2025-02-19 ENCOUNTER — PHARMACY VISIT (OUTPATIENT)
Dept: PHARMACY | Facility: MEDICAL CENTER | Age: 85
End: 2025-02-19
Payer: COMMERCIAL

## 2025-02-19 PROCEDURE — RXMED WILLOW AMBULATORY MEDICATION CHARGE: Performed by: PHYSICAL MEDICINE & REHABILITATION

## 2025-02-19 PROCEDURE — RXMED WILLOW AMBULATORY MEDICATION CHARGE: Performed by: OPTOMETRIST

## 2025-02-19 RX ORDER — FLUOROMETHOLONE 1 MG/ML
SUSPENSION/ DROPS OPHTHALMIC
Qty: 5 ML | Refills: 0 | OUTPATIENT
Start: 2025-02-19

## 2025-02-19 RX ORDER — DIAZEPAM 5 MG/1
TABLET ORAL
Qty: 2 TABLET | Refills: 0 | OUTPATIENT
Start: 2025-02-18

## 2025-03-06 PROCEDURE — RXMED WILLOW AMBULATORY MEDICATION CHARGE: Performed by: PHYSICAL MEDICINE & REHABILITATION

## 2025-03-07 ENCOUNTER — PHARMACY VISIT (OUTPATIENT)
Dept: PHARMACY | Facility: MEDICAL CENTER | Age: 85
End: 2025-03-07
Payer: COMMERCIAL

## 2025-03-07 PROCEDURE — RXMED WILLOW AMBULATORY MEDICATION CHARGE: Performed by: NURSE PRACTITIONER

## 2025-03-11 PROCEDURE — RXMED WILLOW AMBULATORY MEDICATION CHARGE: Performed by: OPTOMETRIST

## 2025-03-17 ENCOUNTER — PHARMACY VISIT (OUTPATIENT)
Dept: PHARMACY | Facility: MEDICAL CENTER | Age: 85
End: 2025-03-17
Payer: COMMERCIAL

## 2025-04-07 PROCEDURE — RXMED WILLOW AMBULATORY MEDICATION CHARGE: Performed by: INTERNAL MEDICINE

## 2025-04-09 ENCOUNTER — PHARMACY VISIT (OUTPATIENT)
Dept: PHARMACY | Facility: MEDICAL CENTER | Age: 85
End: 2025-04-09
Payer: COMMERCIAL

## 2025-04-15 ENCOUNTER — HOSPITAL ENCOUNTER (EMERGENCY)
Facility: MEDICAL CENTER | Age: 85
End: 2025-04-15
Attending: EMERGENCY MEDICINE
Payer: MEDICARE

## 2025-04-15 ENCOUNTER — APPOINTMENT (OUTPATIENT)
Dept: RADIOLOGY | Facility: MEDICAL CENTER | Age: 85
End: 2025-04-15
Attending: EMERGENCY MEDICINE
Payer: MEDICARE

## 2025-04-15 ENCOUNTER — HOSPITAL ENCOUNTER (OUTPATIENT)
Dept: RADIOLOGY | Facility: MEDICAL CENTER | Age: 85
End: 2025-04-15
Attending: INTERNAL MEDICINE
Payer: MEDICARE

## 2025-04-15 ENCOUNTER — TELEPHONE (OUTPATIENT)
Dept: CARDIOLOGY | Facility: MEDICAL CENTER | Age: 85
End: 2025-04-15
Payer: MEDICARE

## 2025-04-15 VITALS
WEIGHT: 180.34 LBS | BODY MASS INDEX: 30.05 KG/M2 | HEIGHT: 65 IN | RESPIRATION RATE: 14 BRPM | DIASTOLIC BLOOD PRESSURE: 74 MMHG | TEMPERATURE: 98 F | HEART RATE: 52 BPM | SYSTOLIC BLOOD PRESSURE: 197 MMHG | OXYGEN SATURATION: 96 %

## 2025-04-15 VITALS — DIASTOLIC BLOOD PRESSURE: 114 MMHG | HEART RATE: 59 BPM | SYSTOLIC BLOOD PRESSURE: 244 MMHG

## 2025-04-15 DIAGNOSIS — R00.1 BRADYCARDIA: ICD-10-CM

## 2025-04-15 DIAGNOSIS — I10 HTN (HYPERTENSION), MALIGNANT: ICD-10-CM

## 2025-04-15 DIAGNOSIS — I83.812 VARICOSE VEINS OF LEFT LOWER EXTREMITY WITH PAIN: ICD-10-CM

## 2025-04-15 DIAGNOSIS — I1A.0 RESISTANT HYPERTENSION: ICD-10-CM

## 2025-04-15 LAB
ALBUMIN SERPL BCP-MCNC: 4.2 G/DL (ref 3.2–4.9)
ALBUMIN/GLOB SERPL: 1.3 G/DL
ALP SERPL-CCNC: 76 U/L (ref 30–99)
ALT SERPL-CCNC: 19 U/L (ref 2–50)
ANION GAP SERPL CALC-SCNC: 9 MMOL/L (ref 7–16)
AST SERPL-CCNC: 25 U/L (ref 12–45)
BASOPHILS # BLD AUTO: 0.6 % (ref 0–1.8)
BASOPHILS # BLD: 0.04 K/UL (ref 0–0.12)
BILIRUB SERPL-MCNC: 0.5 MG/DL (ref 0.1–1.5)
BUN SERPL-MCNC: 19 MG/DL (ref 8–22)
CALCIUM ALBUM COR SERPL-MCNC: 9.4 MG/DL (ref 8.5–10.5)
CALCIUM SERPL-MCNC: 9.6 MG/DL (ref 8.5–10.5)
CHLORIDE SERPL-SCNC: 104 MMOL/L (ref 96–112)
CO2 SERPL-SCNC: 26 MMOL/L (ref 20–33)
CREAT SERPL-MCNC: 1.11 MG/DL (ref 0.5–1.4)
EKG IMPRESSION: NORMAL
EOSINOPHIL # BLD AUTO: 0.28 K/UL (ref 0–0.51)
EOSINOPHIL NFR BLD: 4.2 % (ref 0–6.9)
ERYTHROCYTE [DISTWIDTH] IN BLOOD BY AUTOMATED COUNT: 51.6 FL (ref 35.9–50)
GFR SERPLBLD CREATININE-BSD FMLA CKD-EPI: 49 ML/MIN/1.73 M 2
GLOBULIN SER CALC-MCNC: 3.2 G/DL (ref 1.9–3.5)
GLUCOSE SERPL-MCNC: 112 MG/DL (ref 65–99)
HCT VFR BLD AUTO: 49.5 % (ref 37–47)
HGB BLD-MCNC: 16.1 G/DL (ref 12–16)
IMM GRANULOCYTES # BLD AUTO: 0.03 K/UL (ref 0–0.11)
IMM GRANULOCYTES NFR BLD AUTO: 0.4 % (ref 0–0.9)
LYMPHOCYTES # BLD AUTO: 2.25 K/UL (ref 1–4.8)
LYMPHOCYTES NFR BLD: 33.4 % (ref 22–41)
MCH RBC QN AUTO: 30 PG (ref 27–33)
MCHC RBC AUTO-ENTMCNC: 32.5 G/DL (ref 32.2–35.5)
MCV RBC AUTO: 92.2 FL (ref 81.4–97.8)
MONOCYTES # BLD AUTO: 0.52 K/UL (ref 0–0.85)
MONOCYTES NFR BLD AUTO: 7.7 % (ref 0–13.4)
NEUTROPHILS # BLD AUTO: 3.61 K/UL (ref 1.82–7.42)
NEUTROPHILS NFR BLD: 53.7 % (ref 44–72)
NRBC # BLD AUTO: 0 K/UL
NRBC BLD-RTO: 0 /100 WBC (ref 0–0.2)
PLATELET # BLD AUTO: 251 K/UL (ref 164–446)
PMV BLD AUTO: 9.1 FL (ref 9–12.9)
POTASSIUM SERPL-SCNC: 3.9 MMOL/L (ref 3.6–5.5)
PROT SERPL-MCNC: 7.4 G/DL (ref 6–8.2)
RBC # BLD AUTO: 5.37 M/UL (ref 4.2–5.4)
SODIUM SERPL-SCNC: 139 MMOL/L (ref 135–145)
TROPONIN T SERPL-MCNC: 15 NG/L (ref 6–19)
WBC # BLD AUTO: 6.7 K/UL (ref 4.8–10.8)

## 2025-04-15 PROCEDURE — 36415 COLL VENOUS BLD VENIPUNCTURE: CPT

## 2025-04-15 PROCEDURE — 85025 COMPLETE CBC W/AUTO DIFF WBC: CPT

## 2025-04-15 PROCEDURE — 71045 X-RAY EXAM CHEST 1 VIEW: CPT

## 2025-04-15 PROCEDURE — 96374 THER/PROPH/DIAG INJ IV PUSH: CPT

## 2025-04-15 PROCEDURE — 99285 EMERGENCY DEPT VISIT HI MDM: CPT

## 2025-04-15 PROCEDURE — 700111 HCHG RX REV CODE 636 W/ 250 OVERRIDE (IP): Mod: JZ | Performed by: EMERGENCY MEDICINE

## 2025-04-15 PROCEDURE — 84484 ASSAY OF TROPONIN QUANT: CPT

## 2025-04-15 PROCEDURE — 93005 ELECTROCARDIOGRAM TRACING: CPT | Mod: TC | Performed by: EMERGENCY MEDICINE

## 2025-04-15 PROCEDURE — A9270 NON-COVERED ITEM OR SERVICE: HCPCS | Performed by: EMERGENCY MEDICINE

## 2025-04-15 PROCEDURE — 96376 TX/PRO/DX INJ SAME DRUG ADON: CPT

## 2025-04-15 PROCEDURE — 80053 COMPREHEN METABOLIC PANEL: CPT

## 2025-04-15 PROCEDURE — 700102 HCHG RX REV CODE 250 W/ 637 OVERRIDE(OP): Performed by: EMERGENCY MEDICINE

## 2025-04-15 RX ORDER — NEBIVOLOL 20 MG/1
20 TABLET ORAL DAILY
Qty: 90 TABLET | Refills: 3 | Status: SHIPPED | OUTPATIENT
Start: 2025-04-15 | End: 2025-04-22

## 2025-04-15 RX ORDER — HYDRALAZINE HYDROCHLORIDE 20 MG/ML
10 INJECTION INTRAMUSCULAR; INTRAVENOUS
Status: DISCONTINUED | OUTPATIENT
Start: 2025-04-15 | End: 2025-04-15 | Stop reason: HOSPADM

## 2025-04-15 RX ORDER — VALSARTAN 80 MG/1
320 TABLET ORAL ONCE
Status: COMPLETED | OUTPATIENT
Start: 2025-04-15 | End: 2025-04-15

## 2025-04-15 RX ADMIN — HYDRALAZINE HYDROCHLORIDE 10 MG: 20 INJECTION, SOLUTION INTRAMUSCULAR; INTRAVENOUS at 11:17

## 2025-04-15 RX ADMIN — VALSARTAN 320 MG: 80 TABLET ORAL at 10:09

## 2025-04-15 RX ADMIN — HYDRALAZINE HYDROCHLORIDE 10 MG: 20 INJECTION, SOLUTION INTRAMUSCULAR; INTRAVENOUS at 11:52

## 2025-04-15 ASSESSMENT — FIBROSIS 4 INDEX: FIB4 SCORE: 1.79

## 2025-04-15 NOTE — TELEPHONE ENCOUNTER
Blood pressure was high today in vein appointment of 244/114.  Will increase Bystolic to 20 mg daily in PM.  Valsartan 320 mg daily in AM.    Ita Joseph M.D.

## 2025-04-15 NOTE — ED TRIAGE NOTES
Pt ambulated to triage with   Chief Complaint   Patient presents with    Blood Pressure Problem     Pt was check in today for a procedure and pt BP was elevated.  Takes medications at night for BP and is compliant with meds.   Nitro patch in placed from procedure staff     Pt to have Vein ablation with Dr Joseph.  Pt reports 1/10 headache from Nitro.  No c/o chest pain or other complaints.  Pt Informed regarding triage process and verbalized understanding to inform triage tech or RN for any changes in condition. Placed in lobby.

## 2025-04-15 NOTE — DISCHARGE PLANNING
TCN following. HTH/SCP chart review completed. Note pt currently in ED 2' to blood pressure problem (Pt was check in today for a procedure and pt BP was elevated. Takes medications at night for BP and is compliant with meds. Nitro patch in placed from procedure staff).  She is on RA.      Patient has a [facility] PCP.  Note pt does not have primary/follow up visit scheduled.  Will refer to .  Per review, she is ambulatory to restroom with steady gait. At this time anticipating that pt will dc to home (either directly from ED or after admission to [facility] if warranted).     If patient does not warrant admission/ inpatient status to [facility] and is unable to functionally discharge home, please reach out to TCN for assist with SCP auth to discharge directly from ED to [facility].     If patient admits to [facility], TCN will continue to monitor and assist with transitional care needs as indicated. Please reach out to TCN via VOALTE if post acute transitional care needs are warranted for dc planning.

## 2025-04-15 NOTE — PROCEDURES
Vein procedure was cancelled due to extremely elevated BP of 242/114. Patient will go to ER for IV medical therapy to lower BP.    Ita Joseph M.D.

## 2025-04-15 NOTE — ED PROVIDER NOTES
ED Provider Note    CHIEF COMPLAINT  Chief Complaint   Patient presents with    Blood Pressure Problem     Pt was check in today for a procedure and pt BP was elevated.  Takes medications at night for BP and is compliant with meds.   Nitro patch in placed from procedure staff     EXTERNAL RECORDS REVIEWED  Outpatient Notes cardiology note from earlier today where the patient was seen and evaluated for a pain appointment with Dr. NANCE, patient had her blood pressure medications increased today to 20 mg nightly Bystolic and valsartan 320 g daily morning.  Arrives with a Nitropatch in place    HPI/ROS  LIMITATION TO HISTORY   Select: : None  OUTSIDE HISTORIAN(S):  Caregiver at bedside    Kacie Rubalcava is a 84 y.o. female who presents to the emergency room being sent over as her blood pressure is persistently elevated.  The patient states that when she has her blood pressure elevated before procedures that she typically just waits and it abates.  She has had issues with anxiety induced hypertension but is on multiple altercations.  She does not check her blood pressure regularly and right now he is denying any headaches, no preceding chest pain, chest pressure sensations, no nausea or vomiting and no visual changes, no disequilibrium or strokelike symptoms have been noted by her family members.  The patient states she was seen by her cardiologist this morning, they had made recommendations of instead of taking only nightly medications that she would start transitioning to having morning medication and nightly medication.  At the time of my assessment she is reporting a 1 out of 10 generalized subtle headache since they applied the nitroglycerin, she has not had any weakness or other evolving symptoms and says that this feels very normal for her.    PAST MEDICAL HISTORY   has a past medical history of Anxiety, Arthritis (03/12/15), Chickenpox, Degenerative joint disease, Epidermal cyst (10/16/2018), Swazi measles,  High cholesterol, HTN (hypertension), benign, Hyperlipemia, Hypertension, Microscopic colitis (2010), Neck pain on left side (5/31/2017), Osteoporosis, Other specified symptom associated with female genital organs, Seborrheic keratosis (10/16/2018), and Snoring.    SURGICAL HISTORY   has a past surgical history that includes tonsillectomy; us-needle core bx-breast panel; breast biopsy (11/13/08); hysteroscopy with video diagnostic (3/13/2015); dilation and curettage (3/13/2015); appendectomy; colon resection (2000); and other neurological surg.    FAMILY HISTORY  Family History   Problem Relation Age of Onset    Lung Disease Mother     Cancer Mother         lung cancer    Cancer Paternal Aunt         breast     SOCIAL HISTORY  Social History     Tobacco Use    Smoking status: Never    Smokeless tobacco: Never   Vaping Use    Vaping status: Never Used   Substance and Sexual Activity    Alcohol use: Yes     Comment: Occ    Drug use: No    Sexual activity: Yes     Partners: Male     CURRENT MEDICATIONS  Home Medications       Reviewed by Joya Mahan R.N. (Registered Nurse) on 04/15/25 at 0902  Med List Status: Partial     Medication Last Dose Status   apixaban (ELIQUIS) 5mg Tab  Active   CORAL CALCIUM PO  Active   cycloSPORINE (RESTASIS) 0.05 % ophthalmic emulsion  Active   diazePAM (VALIUM) 5 MG Tab  Active   diphenoxylate-atropine (LOMOTIL) 2.5-0.025 MG Tab  Active   Empagliflozin (JARDIANCE) 10 MG Tab tablet  Active   Finerenone (KERENDIA) 10 MG Tab  Active   fluorometholone (FML) 0.1 % Suspension  Active   gabapentin (NEURONTIN) 300 MG Cap  Active   Nebivolol HCl 20 MG Tab  Active   rosuvastatin (CRESTOR) 10 MG Tab  Active   valsartan (DIOVAN) 320 MG tablet  Active                  ALLERGIES  Allergies   Allergen Reactions    Food Anaphylaxis and Vomiting     mussels    Sulfa Drugs Anaphylaxis    Augmentin Vomiting    Nifedipine Swelling     Leg edema     PHYSICAL EXAM  VITAL SIGNS: BP (!) 197/74   Pulse (!)  "52   Temp 36.7 °C (98 °F)   Resp 14   Ht 1.638 m (5' 4.5\")   Wt 81.8 kg (180 lb 5.4 oz)   LMP 2002   SpO2 96%   BMI 30.48 kg/m²    Genl: F sitting in chair comfortably, speaking clearly, appears slightly anxious but in no acute distress   Head: NC/AT   ENT: Mucous membranes moist, posterior pharynx clear, uvula midline, nares patent bilaterally   Eyes: Normal sclera, pupils equal round reactive to light  Neck: Supple, FROM, no LAD appreciated, no jvd or bruits  Pulmonary: Lungs are clear to auscultation bilaterally  Chest: No TTP  CV:  bradycardia (60), no murmur appreciated, pulses 2+ in both upper and lower extremities,  Abdomen: soft, NT/ND; no rebound/guarding, no masses palpated, no HSM   : no CVA or suprapubic tenderness   Musculoskeletal: Pain free ROM of the neck. Moving upper and lower extremities in spontaneous and coordinated fashion  Neuro: A&Ox4 (person, place, time, situation), speech fluent, gait steady, no focal deficits appreciated, No cerebellar signs. Sensation is grossly intact in the distal upper and lower extremities.  5/5 strength in  and dorsiflexion/plantar flexion of the ankles  Skin: No rash or lesions.  No pallor or jaundice.  No cyanosis.  Warm and dry.     EKG/LABS  Results for orders placed or performed during the hospital encounter of 04/15/25   EKG (NOW)   Result Value Ref Range    Report       [facility] Emergency Dept.    Test Date:  2025-04-15  Pt Name:    LA NENA SOLO                Department: ER  MRN:        [id number]                      Room:        22  Gender:     Female                       Technician: [id number]  :        [id number]                   Requested By:JAIRO HENRY  Order #:    [id number]                    Reading MD: Toby Cummings MD    Measurements  Intervals                                Axis  Rate:       47                           P:          30  OR:         171                          QRS:        -39  QRSD:       98 "                           T:          39  QT:         484  QTc:        428    Interpretive Statements  Sinus bradycardia, Rate of 47, motion artifact is noted.  Normal intervals  and axis, no acute ST segment elevations or depressions, similar to prior EKG  dated 1/27/2025.  Electronically Signed On 04- 10:26:57 PDT by Toby Cummings MD       *Note: Due to a large number of results and/or encounters for the requested time period, some results have not been displayed. A complete set of results can be found in Results Review.   I have independently interpreted this EKG    Labs Reviewed   CBC WITH DIFFERENTIAL - Abnormal; Notable for the following components:       Result Value    Hemoglobin 16.1 (*)     Hematocrit 49.5 (*)     RDW 51.6 (*)     All other components within normal limits   COMP METABOLIC PANEL - Abnormal; Notable for the following components:    Glucose 112 (*)     All other components within normal limits   ESTIMATED GFR - Abnormal; Notable for the following components:    GFR (CKD-EPI) 49 (*)     All other components within normal limits   TROPONIN     RADIOLOGY/PROCEDURES   I have independently interpreted the diagnostic imaging associated with this visit and am waiting the final reading from the radiologist.   My preliminary interpretation is as follows: No focal infiltrates, no widened mediastinum, no pneumonia    Radiologist interpretation:  DX-CHEST-PORTABLE (1 VIEW)   Final Result      No acute cardiac or pulmonary abnormalities are identified.        COURSE & MEDICAL DECISION MAKING    ASSESSMENT, COURSE AND PLAN  Care Narrative: Patient presents emergency room for symptoms as described above.  She has known issue with chronic hypertension, has been having resistance to medications and that her procedure this morning she was noted to be hypertensive otherwise asymptomatic.  This procedure was aborted, she had Nitropaste applied and was sent here for further assessment.  She has not been  having any evolving chest discomfort, no nausea vomiting or pleuritic chest pains and no recent illness.  She is not showing signs of any acute strokelike symptomology and is at her baseline and very pleasant.  EKG will be obtained along with some basic blood work to make sure there is no other signs of acute systemic endorgan dysfunction secondary to his persistently elevated blood pressure.  She does not have signs of focal stroke or headache out of proportion and basically had had a headache only since application of the nitroglycerin.  The recommendations from cardiology were to start daytime medication and follow their guidance given her dose of the daytime medications here in the emergency department and will await changes in her blood pressure while getting the labs.    Lab work showed slight hemoconcentration 16 and 49, no leukocytosis, no gross electrolyte abnormalities, no MATILDE.  Troponin was not elevated and GFR is similar to her prior dated between 8 and 10 months ago.    After initiation of the medications and lab work patient's blood pressure is to trend down, though anticipate that this will continue over the next 12 to 24 hours based on the dosing regiment recommended by cardiology.  Single dose of as needed hydralazine was done to get her blood pressure down below 200 systolic.  She remains completely asymptomatic while she is here and has no endorgan dysfunction, no strokelike symptomology and leg has been dealing with chronic hypertension that has been resistant for some time.    Subsequent reassessment shows blood pressure at 197/74, plan is for follow up with Dr. Joseph of cardiology and she is aware of the BP changes that have been recommended by her Cardiologist    DISPOSITION AND DISCUSSIONS  I have discussed management of the patient with the following physicians and YASH's:  none    Discussion of management with other QHP or appropriate source(s): None     Escalation of care considered, and  ultimately not performed:IV fluids    Barriers to care at this time, including but not limited to: none.     Decision tools and prescription drugs considered including, but not limited to: chronic meds are changed with regard to AM delivery.    FINAL DIAGNOSIS  1. Resistant hypertension    2. Bradycardia      Electronically signed by: Emiliano Luis M.D., 4/15/2025 9:34 AM

## 2025-04-15 NOTE — PROGRESS NOTES
Patient sent to ED per Direction of Dr. Joseph for consistent elevated SBP >220.  Patient escorted with procedure BRITTANEY Fournier via wheelchair. RN gave report to ER Triage tech. Patient with standby assist to chair. Patient had caregiver with her.     All Nitro Patches to Chest and left cleaned and removed prior to arrival to ED.

## 2025-04-16 ENCOUNTER — RESULTS FOLLOW-UP (OUTPATIENT)
Dept: CARDIOLOGY | Facility: MEDICAL CENTER | Age: 85
End: 2025-04-16

## 2025-04-17 ENCOUNTER — APPOINTMENT (OUTPATIENT)
Dept: RADIOLOGY | Facility: MEDICAL CENTER | Age: 85
End: 2025-04-17
Attending: INTERNAL MEDICINE
Payer: MEDICARE

## 2025-04-17 ENCOUNTER — OFFICE VISIT (OUTPATIENT)
Dept: INTERNAL MEDICINE | Facility: IMAGING CENTER | Age: 85
End: 2025-04-17
Payer: MEDICARE

## 2025-04-17 VITALS
DIASTOLIC BLOOD PRESSURE: 78 MMHG | WEIGHT: 181 LBS | SYSTOLIC BLOOD PRESSURE: 138 MMHG | HEART RATE: 51 BPM | TEMPERATURE: 97.9 F | RESPIRATION RATE: 17 BRPM | OXYGEN SATURATION: 100 % | BODY MASS INDEX: 30.16 KG/M2 | HEIGHT: 65 IN

## 2025-04-17 DIAGNOSIS — I10 ESSENTIAL HYPERTENSION: Chronic | ICD-10-CM

## 2025-04-17 DIAGNOSIS — Z09 HOSPITAL DISCHARGE FOLLOW-UP: ICD-10-CM

## 2025-04-17 PROCEDURE — 3078F DIAST BP <80 MM HG: CPT | Performed by: FAMILY MEDICINE

## 2025-04-17 PROCEDURE — 99213 OFFICE O/P EST LOW 20 MIN: CPT | Performed by: FAMILY MEDICINE

## 2025-04-17 PROCEDURE — 3075F SYST BP GE 130 - 139MM HG: CPT | Performed by: FAMILY MEDICINE

## 2025-04-17 ASSESSMENT — FIBROSIS 4 INDEX: FIB4 SCORE: 1.94

## 2025-04-18 ENCOUNTER — TELEPHONE (OUTPATIENT)
Dept: CARDIOLOGY | Facility: MEDICAL CENTER | Age: 85
End: 2025-04-18
Payer: MEDICARE

## 2025-04-18 NOTE — PROGRESS NOTES
Chief Complaint   Patient presents with    Hospital Follow-up     Patient believes her increase in BP is due to anxiety.       HPI:  Patient is a 84 y.o. female established patient who presents today for a hospital follow up appointment. Patient was checking in for vein ablation procedure with Dr. Joseph on 4/15/25 and was noted to have /114. Procedure was cancelled, and she was immediately wheeled to ER for further evaluation and treatment.     Patient Active Problem List    Diagnosis Date Noted    Hypercoagulable state due to paroxysmal atrial fibrillation (HCC) 07/09/2024    Anticoagulated 06/27/2024    Paroxysmal atrial fibrillation (HCC) 06/19/2024    Breast cyst, left 11/01/2023    Allergic rhinitis 08/14/2023    Chronic pain of left knee 06/21/2023    OAB (overactive bladder) 02/07/2023    Lumbar foraminal stenosis 02/07/2023    Chronic back pain 02/07/2023    Stage 3a chronic kidney disease 07/10/2022    Type 2 diabetes mellitus with stage 3a chronic kidney disease, without long-term current use of insulin ([facility]) 07/10/2022    BMI 30.0-30.9,adult 07/08/2022    SHIRA (obstructive sleep apnea) 06/21/2021    Epidermal cyst 10/16/2018    Microscopic colitis 09/05/2017    Essential hypertension 12/01/2015    Mixed hyperlipidemia with apolipoprotein E2 variant 04/09/2015    Sinus bradycardia 03/16/2015    Eczema 10/14/2014    Osteoporosis, post-menopausal 10/14/2014    Environmental allergies 04/18/2014    Vitamin D deficiency disease 11/13/2013    Dupuytren's contracture of both hands 05/22/2013    Degenerative joint disease        Past medical, surgical, family, and social history was reviewed and updated in Epic chart by me today.     Medications and allergies reviewed and updated in Epic chart by me today.     ROS:  Pertinent positives listed above in HPI. All other systems have been reviewed and are negative.    PE:   /78 (BP Location: Left arm, Patient Position: Sitting, BP Cuff Size: Adult)   Pulse  "(!) 51   Temp 36.6 °C (97.9 °F) (Temporal)   Resp 17   Ht 1.638 m (5' 4.5\")   Wt 82.1 kg (181 lb)   LMP 01/01/2002   SpO2 100%   BMI 30.59 kg/m²   Vital signs reviewed with patient.     Gen: Well developed; well nourished; no acute distress; age appropriate appearance   CV: Regular rate and rhythm; S1/ S2 present; no murmur, gallop or rub noted  Pulm: No respiratory distress; clear to ascultation b/l; no wheezing or stridor noted b/l  Extremities: chronic peripheral edema/lymphedema b/l LE extremities/ no clubbing nor cyanosis noted  Skin: Warm and dry; no rashes noted   Neuro: No focal deficits noted   Psych: AAOx4; mood and affect are appropriate    A/P:  1. Hospital discharge follow-up  Patient was seen at [facility] ER on 4/15/25 for evaluation and treatment of uncontrolled hypertension, and I have reviewed all pertinent records prior to our visit today.     2. Essential hypertension  Well controlled at this time with ongoing daily medication use managed by [facility]. Patient reports that she often has high anxiety prior to procedures, which is likely the etiology of uncontrolled hypertension. Patient is planning on following up with [facility] next week, and I recommend that patient continue to monitor home vital signs daily as able.             "

## 2025-04-18 NOTE — TELEPHONE ENCOUNTER
Phone number called: [phone]    To TT: pt reports when she checked her HR today using her pulse oximeter her HR is in the 40s. Pt denies any cardiac symptoms but is requesting to change back her bystolic to 10mg daily. Pt unable to report BP recording as her BP machine is not working. Please advise. Thank you.     Pt took Bystolic 20 mg  HR:49  BP: no records  No symptoms

## 2025-04-18 NOTE — TELEPHONE ENCOUNTER
TT    Caller: Kacie Rubalcava    Topic/issue: Patient calling back and providing additional information.    Please also see patient message dated today to Ally Barroso.  Her heart rate has been low around 42 and wonders if this was due to recent medication change made by Dr. Joseph on 04/15/2025.    This was following her ED visit to [facility] on 04/15/2025    Patient is currently scheduled for follow up on 04/22/2025 with APRN.    Patient asking for call back to advise.    Callback Number: [phone]    Thank you,  Kassidy GUERRERO

## 2025-04-18 NOTE — TELEPHONE ENCOUNTER
TT        Caller: Kacie Rubalcava      Topic/issue: Patient was asking for a call back regarding a matter with her care and she was asking for a call back. Please advise        Callback Number: [phone]        Thank you    -Alexandre WATSON

## 2025-04-22 ENCOUNTER — OFFICE VISIT (OUTPATIENT)
Dept: CARDIOLOGY | Facility: MEDICAL CENTER | Age: 85
End: 2025-04-22
Attending: NURSE PRACTITIONER
Payer: MEDICARE

## 2025-04-22 VITALS
HEIGHT: 65 IN | SYSTOLIC BLOOD PRESSURE: 184 MMHG | WEIGHT: 181.8 LBS | OXYGEN SATURATION: 95 % | HEART RATE: 56 BPM | RESPIRATION RATE: 16 BRPM | DIASTOLIC BLOOD PRESSURE: 88 MMHG | BODY MASS INDEX: 30.29 KG/M2

## 2025-04-22 DIAGNOSIS — G47.33 OSA (OBSTRUCTIVE SLEEP APNEA): Chronic | ICD-10-CM

## 2025-04-22 DIAGNOSIS — I48.0 PAROXYSMAL ATRIAL FIBRILLATION (HCC): ICD-10-CM

## 2025-04-22 DIAGNOSIS — R00.1 BRADYCARDIA: ICD-10-CM

## 2025-04-22 DIAGNOSIS — I83.812 VARICOSE VEINS OF LEFT LOWER EXTREMITY WITH PAIN: ICD-10-CM

## 2025-04-22 DIAGNOSIS — I10 HTN (HYPERTENSION), MALIGNANT: ICD-10-CM

## 2025-04-22 PROCEDURE — 99214 OFFICE O/P EST MOD 30 MIN: CPT | Performed by: NURSE PRACTITIONER

## 2025-04-22 PROCEDURE — 99212 OFFICE O/P EST SF 10 MIN: CPT | Performed by: NURSE PRACTITIONER

## 2025-04-22 PROCEDURE — 3079F DIAST BP 80-89 MM HG: CPT | Performed by: NURSE PRACTITIONER

## 2025-04-22 PROCEDURE — 3077F SYST BP >= 140 MM HG: CPT | Performed by: NURSE PRACTITIONER

## 2025-04-22 RX ORDER — NEBIVOLOL 20 MG/1
10 TABLET ORAL DAILY
Qty: 90 TABLET | Refills: 3 | Status: SHIPPED | OUTPATIENT
Start: 2025-04-22

## 2025-04-22 ASSESSMENT — FIBROSIS 4 INDEX: FIB4 SCORE: 1.94

## 2025-04-22 NOTE — PROGRESS NOTES
Chief Complaint   Patient presents with    Hypertension    Bradycardia     F/V Dx: Sinus bradycardia        Subjective     Kacie Rubalcava is an 84 y.o. female who presents today for follow-up of hypertension.    She is a patient of Dr. Joseph and was last seen in the clinic for vein procedure on 4/15/2025.  Her blood pressure that day was extremely elevated BP, 242/114, thus procedure was canceled.  Bystolic was increased to 20 mg in the evening and continued on valsartan 320 mg every morning, she was then sent to ER. In the ER, she received hydralazine 10 mg IV push x 1.  BP improved to 197/74 and remained asymptomatic.    Today she reports heart rate dropped to 42 bpm on 4/18/2025 so she decreased Nebivolol back to 10 mg daily.  Her heart rate improved to 50s bpm however has not been checking her blood pressure at home.  She reports that whenever she sees her PCP her blood pressure is normal. No chest pain, dyspnea, orthopnea, pnd, abdominal bloating/distension, palpitations, light-headedness/dizziness, pre-syncope, or syncope.  Reports lower extremity edema.      Past Medical History:   Diagnosis Date    Anxiety     Arthritis 03/12/15    generalized    Chickenpox     Degenerative joint disease     Epidermal cyst 10/16/2018    Prydeinig measles     High cholesterol     HTN (hypertension), benign     Hyperlipemia     Hypertension     Microscopic colitis 2010    Neck pain on left side 5/31/2017    Osteoporosis     Other specified symptom associated with female genital organs     post menopausal bleeding    Seborrheic keratosis 10/16/2018    Snoring      Past Surgical History:   Procedure Laterality Date    HYSTEROSCOPY WITH VIDEO DIAGNOSTIC  3/13/2015    Performed by Alexandria Keenan M.D. [facility]    DILATION AND CURETTAGE  3/13/2015    Performed by Alexandria Keenan M.D. [facility]    BREAST BIOPSY  11/13/08    Performed by ISRAEL HICKS at SURGERY [facility] DAY [facility]  ORS X3    COLON RESECTION  2000    polyps    APPENDECTOMY      OTHER NEUROLOGICAL SURG      TONSILLECTOMY      US-NEEDLE CORE BX-BREAST PANEL       Family History   Problem Relation Age of Onset    Lung Disease Mother     Cancer Mother         lung cancer    Cancer Paternal Aunt         breast     Social History     Socioeconomic History    Marital status: Single     Spouse name: Not on file    Number of children: Not on file    Years of education: Not on file    Highest education level: Not on file   Occupational History    Not on file   Tobacco Use    Smoking status: Never    Smokeless tobacco: Never   Vaping Use    Vaping status: Never Used   Substance and Sexual Activity    Alcohol use: Yes     Comment: Occ    Drug use: No    Sexual activity: Yes     Partners: Male   Other Topics Concern    Not on file   Social History Narrative    Not on file     Social Drivers of Health     Financial Resource Strain: Not on file   Food Insecurity: No Food Insecurity (6/19/2024)    Hunger Vital Sign     Worried About Running Out of Food in the Last Year: Never true     Ran Out of Food in the Last Year: Never true   Transportation Needs: No Transportation Needs (6/19/2024)    PRAPARE - Transportation     Lack of Transportation (Medical): No     Lack of Transportation (Non-Medical): No   Physical Activity: Not on file   Stress: Not on file   Social Connections: Not on file   Intimate Partner Violence: Not At Risk (6/19/2024)    Humiliation, Afraid, Rape, and Kick questionnaire     Fear of Current or Ex-Partner: No     Emotionally Abused: No     Physically Abused: No     Sexually Abused: No   Housing Stability: Low Risk  (6/19/2024)    Housing Stability Vital Sign     Unable to Pay for Housing in the Last Year: No     Number of Places Lived in the Last Year: 1     Unstable Housing in the Last Year: No     Allergies   Allergen Reactions    Food Anaphylaxis and Vomiting     mussels    Sulfa Drugs Anaphylaxis    Augmentin Vomiting     "Nifedipine Swelling     Leg edema     Outpatient Encounter Medications as of 4/22/2025   Medication Sig Dispense Refill    Nebivolol HCl 20 MG Tab Take 10 mg by mouth every day. 90 Tablet 3    diphenoxylate-atropine (LOMOTIL) 2.5-0.025 MG Tab Take 1 Tablet by mouth 4 times a day as needed for Diarrhea.      cycloSPORINE (RESTASIS) 0.05 % ophthalmic emulsion Administer 1 Drop into both eyes 2 times a day. 180 Each 3    apixaban (ELIQUIS) 5mg Tab Take 1 Tablet by mouth 2 times a day. Indications: Thromboembolism secondary to Atrial Fibrillation 180 Tablet 3    Empagliflozin (JARDIANCE) 10 MG Tab tablet Take 1 Tablet by mouth every day. 90 Tablet 3    valsartan (DIOVAN) 320 MG tablet Take 1 Tablet by mouth every day. 100 Tablet 4    rosuvastatin (CRESTOR) 10 MG Tab Take 1 Tablet by mouth every evening. 100 Tablet 4    CORAL CALCIUM PO Take 1 Tablet by mouth every evening. 60 Each     [DISCONTINUED] Nebivolol HCl 20 MG Tab Take 1 tablet by mouth every day. 90 Tablet 3    fluorometholone (FML) 0.1 % Suspension Instill 1 drop in both eyes 4 times daily for 7 days then twice daily for 7 days (shake bottle) (Patient not taking: Reported on 4/22/2025) 5 mL 0     No facility-administered encounter medications on file as of 4/22/2025.     Review of Systems   All other systems reviewed and are negative.  As per HPI.       Objective     BP (!) 184/88 (BP Location: Left arm, Patient Position: Sitting, BP Cuff Size: Adult)   Pulse (!) 56   Resp 16   Ht 1.638 m (5' 4.5\")   Wt 82.5 kg (181 lb 12.8 oz)   LMP 01/01/2002   SpO2 95%   BMI 30.72 kg/m²     Physical Exam  Vitals reviewed.   Constitutional:       Appearance: She is well-developed.   HENT:      Head: Normocephalic and atraumatic.   Eyes:      Pupils: Pupils are equal, round, and reactive to light.   Neck:      Vascular: No JVD.   Cardiovascular:      Rate and Rhythm: Normal rate and regular rhythm.      Heart sounds: Normal heart sounds.   Pulmonary:      Effort: " Pulmonary effort is normal. No respiratory distress.      Breath sounds: Normal breath sounds. No wheezing or rales.   Abdominal:      General: Bowel sounds are normal.      Palpations: Abdomen is soft.   Musculoskeletal:         General: Normal range of motion.      Cervical back: Normal range of motion and neck supple.      Right lower leg: Edema present.      Left lower leg: Edema present.      Comments: + varicose veins without evidence of ulcer, discoloration.   Skin:     General: Skin is warm and dry.   Neurological:      General: No focal deficit present.      Mental Status: She is alert and oriented to person, place, and time.   Psychiatric:         Behavior: Behavior normal.        Imaging:    TTE: 6/19/2024  CONCLUSIONS  Mild concentric left ventricular hypertrophy. Normal left ventricular   size and systolic function.  Normal right ventricular size and systolic function.  Normal left atrial size.  Normal pericardium without effusion.    Assessment & Plan     1. HTN (hypertension), malignant  Nebivolol HCl 20 MG Tab      2. Bradycardia        3. SHIRA (obstructive sleep apnea)        4. Varicose veins of left lower extremity with pain          Medical Decision Making: Today's Assessment/Status/Plan:     HTN  --BP today 184/88, asymptomatic  --She declines addition of any antihypertensives at this time.  States that her blood pressure is always elevated whenever she comes to our clinic.  --Discussed risk of stroke and MI with uncontrolled hypertension.  --Strongly advised to check BP daily and to call us if persistently above 130/80.  Advised to call us with BP readings next week.  If remains elevated, will add hydralazine.  --Continue valsartan 320 mg in the morning and Nebivolol 10 mg in the evening at this time  --Finerenone cost prohibitive    Bradycardia  -- Resting heart rate today 56 bpm after decreasing Nebivolol back to 10 mg daily    SHIRA  -- Tolerating CPAP    Venous insufficiency  -- Continue  compression socks and leg elevation  -- Advised on 2 g sodium restriction    Follow-up in 2 weeks and will schedule a follow-up with Dr. Joseph in 6/2025.    Linwood Farah, Cardiology NP  [facility], [facility]  [address] [address]  Phone: [phone]  Fax: [phone]

## 2025-04-24 PROCEDURE — RXMED WILLOW AMBULATORY MEDICATION CHARGE: Performed by: INTERNAL MEDICINE

## 2025-04-28 ENCOUNTER — PHARMACY VISIT (OUTPATIENT)
Dept: PHARMACY | Facility: MEDICAL CENTER | Age: 85
End: 2025-04-28
Payer: COMMERCIAL

## 2025-05-05 ENCOUNTER — TELEPHONE (OUTPATIENT)
Dept: CARDIOLOGY | Facility: MEDICAL CENTER | Age: 85
End: 2025-05-05
Payer: MEDICARE

## 2025-05-05 DIAGNOSIS — I10 HTN (HYPERTENSION), MALIGNANT: ICD-10-CM

## 2025-05-05 NOTE — TELEPHONE ENCOUNTER
HJ    Patient dropped off blood pressure readings for Linwood Farah. Documents scanned into media and given to RN.

## 2025-05-06 RX ORDER — SPIRONOLACTONE 25 MG/1
12.5 TABLET ORAL DAILY
Qty: 90 TABLET | Refills: 3 | Status: SHIPPED | OUTPATIENT
Start: 2025-05-06

## 2025-05-06 NOTE — TELEPHONE ENCOUNTER
KENNETH Singletary R.N.2 hours ago (11:33 AM)     Home BP reviewed. Added spironolactone 12.5 mg daily. Please advise pt to keep appt with me tomorrow. Thanks.   ------------------------------------------------------------------------------------    Attempted to call pt, unable to LVM. [employer] message sent.

## 2025-05-07 ENCOUNTER — OFFICE VISIT (OUTPATIENT)
Dept: CARDIOLOGY | Facility: MEDICAL CENTER | Age: 85
End: 2025-05-07
Attending: NURSE PRACTITIONER
Payer: MEDICARE

## 2025-05-07 VITALS
BODY MASS INDEX: 30.9 KG/M2 | RESPIRATION RATE: 14 BRPM | OXYGEN SATURATION: 95 % | HEART RATE: 50 BPM | DIASTOLIC BLOOD PRESSURE: 82 MMHG | HEIGHT: 64 IN | WEIGHT: 181 LBS | SYSTOLIC BLOOD PRESSURE: 150 MMHG

## 2025-05-07 DIAGNOSIS — I10 HTN (HYPERTENSION), MALIGNANT: ICD-10-CM

## 2025-05-07 DIAGNOSIS — G47.33 OSA (OBSTRUCTIVE SLEEP APNEA): ICD-10-CM

## 2025-05-07 DIAGNOSIS — R00.1 BRADYCARDIA: ICD-10-CM

## 2025-05-07 PROCEDURE — 99213 OFFICE O/P EST LOW 20 MIN: CPT | Performed by: NURSE PRACTITIONER

## 2025-05-07 PROCEDURE — 3077F SYST BP >= 140 MM HG: CPT | Performed by: NURSE PRACTITIONER

## 2025-05-07 PROCEDURE — 99212 OFFICE O/P EST SF 10 MIN: CPT | Performed by: NURSE PRACTITIONER

## 2025-05-07 PROCEDURE — 3079F DIAST BP 80-89 MM HG: CPT | Performed by: NURSE PRACTITIONER

## 2025-05-07 PROCEDURE — 99214 OFFICE O/P EST MOD 30 MIN: CPT | Performed by: NURSE PRACTITIONER

## 2025-05-07 ASSESSMENT — FIBROSIS 4 INDEX: FIB4 SCORE: 1.94

## 2025-05-07 NOTE — PROGRESS NOTES
Chief Complaint   Patient presents with    Hypertension       Subjective     Kacie Rubalcava is an 84 y.o. female who presents today for follow-up of hypertension.    She is a patient of Dr. Joseph and was last seen in the clinic for vein procedure on 4/15/2025.  Her blood pressure that day was extremely elevated BP, 242/114, thus procedure was canceled.  Bystolic was increased to 20 mg in the evening and continued on valsartan 320 mg every morning, she was then sent to ER. In the ER, she received hydralazine 10 mg IV push x 1.  BP improved to 197/74 and remained asymptomatic.    Since last seen on 4/22/2025, BP at home 136//100, HR 50-54 bpm. No chest pain, dyspnea, orthopnea, pnd, abdominal bloating/distension, palpitations, light-headedness/dizziness, pre-syncope, or syncope.  Reports lower extremity edema.        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 at [facility]    DILATION AND CURETTAGE  3/13/2015    Performed by Alexandria Keenan M.D. at [facility]    BREAST BIOPSY  11/13/08    Performed by ISRAEL HICKS at [facility]    COLON RESECTION  2000    polyps    APPENDECTOMY      OTHER NEUROLOGICAL SURG      TONSILLECTOMY      US-NEEDLE CORE BX-BREAST PANEL       Family History   Problem Relation Age of Onset    Lung Disease Mother     Cancer Mother         lung  cancer    Cancer Paternal Aunt         breast     Social History     Socioeconomic History    Marital status: Single     Spouse name: Not on file    Number of children: Not on file    Years of education: Not on file    Highest education level: Not on file   Occupational History    Not on file   Tobacco Use    Smoking status: Never    Smokeless tobacco: Never   Vaping Use    Vaping status: Never Used   Substance and Sexual Activity    Alcohol use: Yes     Comment: Occ    Drug use: No    Sexual activity: Yes     Partners: Male   Other Topics Concern    Not on file   Social History Narrative    Not on file     Social Drivers of Health     Financial Resource Strain: Not on file   Food Insecurity: No Food Insecurity (6/19/2024)    Hunger Vital Sign     Worried About Running Out of Food in the Last Year: Never true     Ran Out of Food in the Last Year: Never true   Transportation Needs: No Transportation Needs (6/19/2024)    PRAPARE - Transportation     Lack of Transportation (Medical): No     Lack of Transportation (Non-Medical): No   Physical Activity: Not on file   Stress: Not on file   Social Connections: Not on file   Intimate Partner Violence: Not At Risk (6/19/2024)    Humiliation, Afraid, Rape, and Kick questionnaire     Fear of Current or Ex-Partner: No     Emotionally Abused: No     Physically Abused: No     Sexually Abused: No   Housing Stability: Low Risk  (6/19/2024)    Housing Stability Vital Sign     Unable to Pay for Housing in the Last Year: No     Number of Places Lived in the Last Year: 1     Unstable Housing in the Last Year: No     Allergies   Allergen Reactions    Food Anaphylaxis and Vomiting     mussels    Sulfa Drugs Anaphylaxis    Augmentin Vomiting    Nifedipine Swelling     Leg edema     Outpatient Encounter Medications as of 5/7/2025   Medication Sig Dispense Refill    Nebivolol HCl 20 MG Tab Take 10 mg by mouth every day. 90 Tablet 3    apixaban (ELIQUIS) 5mg Tab Take 1 Tablet by mouth 2  "times a day. Indications: Thromboembolism secondary to Atrial Fibrillation 200 Tablet 3    diphenoxylate-atropine (LOMOTIL) 2.5-0.025 MG Tab Take 1 Tablet by mouth 4 times a day as needed for Diarrhea.      cycloSPORINE (RESTASIS) 0.05 % ophthalmic emulsion Administer 1 Drop into both eyes 2 times a day. 180 Each 3    Empagliflozin (JARDIANCE) 10 MG Tab tablet Take 1 Tablet by mouth every day. 90 Tablet 3    valsartan (DIOVAN) 320 MG tablet Take 1 Tablet by mouth every day. 100 Tablet 4    rosuvastatin (CRESTOR) 10 MG Tab Take 1 Tablet by mouth every evening. 100 Tablet 4    CORAL CALCIUM PO Take 1 Tablet by mouth every evening. 60 Each     spironolactone (ALDACTONE) 25 MG Tab Take 0.5 Tablets by mouth every day. (Patient not taking: Reported on 5/7/2025) 90 Tablet 3    fluorometholone (FML) 0.1 % Suspension Instill 1 drop in both eyes 4 times daily for 7 days then twice daily for 7 days (shake bottle) (Patient not taking: Reported on 5/7/2025) 5 mL 0     No facility-administered encounter medications on file as of 5/7/2025.     Review of Systems   All other systems reviewed and are negative.  As per HPI.       Objective     BP (!) 150/82 (BP Location: Left arm, Patient Position: Sitting, BP Cuff Size: Adult)   Pulse (!) 50   Resp 14   Ht 1.626 m (5' 4\")   Wt 82.1 kg (181 lb)   LMP 01/01/2002   SpO2 95%   BMI 31.07 kg/m²     Physical Exam  Vitals reviewed.   Constitutional:       Appearance: She is well-developed.   HENT:      Head: Normocephalic and atraumatic.   Eyes:      Pupils: Pupils are equal, round, and reactive to light.   Neck:      Vascular: No JVD.   Cardiovascular:      Rate and Rhythm: Normal rate and regular rhythm.      Heart sounds: Normal heart sounds.   Pulmonary:      Effort: Pulmonary effort is normal. No respiratory distress.      Breath sounds: Normal breath sounds. No wheezing or rales.   Abdominal:      General: Bowel sounds are normal.      Palpations: Abdomen is soft. "   Musculoskeletal:         General: Normal range of motion.      Cervical back: Normal range of motion and neck supple.      Right lower leg: Edema present.      Left lower leg: Edema present.      Comments: + varicose veins without evidence of ulcer, discoloration.   Skin:     General: Skin is warm and dry.   Neurological:      General: No focal deficit present.      Mental Status: She is alert and oriented to person, place, and time.   Psychiatric:         Behavior: Behavior normal.        Imaging:    TTE: 6/19/2024  CONCLUSIONS  Mild concentric left ventricular hypertrophy. Normal left ventricular   size and systolic function.  Normal right ventricular size and systolic function.  Normal left atrial size.  Normal pericardium without effusion.    Assessment & Plan     1. HTN (hypertension), malignant  Basic Metabolic Panel      2. Bradycardia        3. SHIRA (obstructive sleep apnea)            Medical Decision Making: Today's Assessment/Status/Plan:     HTN  --BP today 150/82, asymptomatic, remains elevated but improved from last clinic visit.  She previously declined additional antihypertensives.  --She brought her blood pressure monitor today to compare her readings.  Her home BP monitor reads >30 mmHg systolic and > 20 mmHg diastolic than clinic BP readings.   --Will add spironolactone 12.5 mg daily and check BMP in 1 week  --Continue valsartan 320 mg in the morning and Nebivolol 10 mg in the evening at this time  --Finerenone cost prohibitive    Bradycardia  -- Resting heart rate at home 50-54 bpm after decreasing Nebivolol back to 10 mg daily (dropped to 42 bpm on 4/18/2025)    SHIRA  -- Tolerating CPAP    Venous insufficiency  -- Continue compression socks and leg elevation  -- Advised on 2 g sodium restriction    Follow-up with Dr. Joseph in 6/2025.    Linwood Farah, Cardiology NP  [facility]  [place] [facility], [facility]  [address], [address]  [address]  Phone: [phone]  Fax: [phone]

## 2025-05-08 PROCEDURE — RXMED WILLOW AMBULATORY MEDICATION CHARGE: Performed by: NURSE PRACTITIONER

## 2025-05-09 ENCOUNTER — PHARMACY VISIT (OUTPATIENT)
Dept: PHARMACY | Facility: MEDICAL CENTER | Age: 85
End: 2025-05-09
Payer: COMMERCIAL

## 2025-05-15 ENCOUNTER — NON-PROVIDER VISIT (OUTPATIENT)
Dept: INTERNAL MEDICINE | Facility: IMAGING CENTER | Age: 85
End: 2025-05-15
Payer: MEDICARE

## 2025-05-15 ENCOUNTER — HOSPITAL ENCOUNTER (OUTPATIENT)
Facility: MEDICAL CENTER | Age: 85
End: 2025-05-15
Attending: INTERNAL MEDICINE
Payer: MEDICARE

## 2025-05-15 VITALS — SYSTOLIC BLOOD PRESSURE: 126 MMHG | DIASTOLIC BLOOD PRESSURE: 78 MMHG

## 2025-05-15 DIAGNOSIS — E78.00 PURE HYPERCHOLESTEROLEMIA: ICD-10-CM

## 2025-05-15 DIAGNOSIS — E11.65 TYPE 2 DIABETES MELLITUS WITH HYPERGLYCEMIA, WITHOUT LONG-TERM CURRENT USE OF INSULIN (HCC): ICD-10-CM

## 2025-05-15 LAB
ANION GAP SERPL CALC-SCNC: 10 MMOL/L (ref 7–16)
BUN SERPL-MCNC: 27 MG/DL (ref 8–22)
CALCIUM SERPL-MCNC: 9.6 MG/DL (ref 8.5–10.5)
CHLORIDE SERPL-SCNC: 104 MMOL/L (ref 96–112)
CHOLEST SERPL-MCNC: 118 MG/DL (ref 100–199)
CO2 SERPL-SCNC: 23 MMOL/L (ref 20–33)
CREAT SERPL-MCNC: 1.38 MG/DL (ref 0.5–1.4)
CREAT UR-MCNC: 126 MG/DL
EST. AVERAGE GLUCOSE BLD GHB EST-MCNC: 128 MG/DL
GFR SERPLBLD CREATININE-BSD FMLA CKD-EPI: 37 ML/MIN/1.73 M 2
GLUCOSE SERPL-MCNC: 123 MG/DL (ref 65–99)
HBA1C MFR BLD: 6.1 % (ref 4–5.6)
HDLC SERPL-MCNC: 56 MG/DL
LDLC SERPL CALC-MCNC: 42 MG/DL
MICROALBUMIN UR-MCNC: 1.5 MG/DL
MICROALBUMIN/CREAT UR: 12 MG/G (ref 0–30)
POTASSIUM SERPL-SCNC: 4.2 MMOL/L (ref 3.6–5.5)
SODIUM SERPL-SCNC: 137 MMOL/L (ref 135–145)
TRIGL SERPL-MCNC: 101 MG/DL (ref 0–149)

## 2025-05-15 PROCEDURE — 99999 PR NO CHARGE: CPT

## 2025-05-15 PROCEDURE — 82570 ASSAY OF URINE CREATININE: CPT

## 2025-05-15 PROCEDURE — 80061 LIPID PANEL: CPT

## 2025-05-15 PROCEDURE — 82043 UR ALBUMIN QUANTITATIVE: CPT

## 2025-05-15 PROCEDURE — 83036 HEMOGLOBIN GLYCOSYLATED A1C: CPT

## 2025-05-15 PROCEDURE — 80048 BASIC METABOLIC PNL TOTAL CA: CPT

## 2025-05-16 ENCOUNTER — RESULTS FOLLOW-UP (OUTPATIENT)
Dept: CARDIOLOGY | Facility: MEDICAL CENTER | Age: 85
End: 2025-05-16

## 2025-05-22 DIAGNOSIS — E78.5 DYSLIPIDEMIA: ICD-10-CM

## 2025-05-22 DIAGNOSIS — I10 PRIMARY HYPERTENSION: ICD-10-CM

## 2025-05-22 NOTE — TELEPHONE ENCOUNTER
Is the patient due for a refill? Yes    Was the patient seen the last 15 months? Yes    Date of last office visit: 5/7/2025    Does the patient have an upcoming appointment?  Yes   If yes, When? 6/2/2025    Provider to refill:DOMO    Does the patient have [facility] and need 100-day supply? (This applies to ALL medications) Yes, quantity updated to 100 days

## 2025-05-23 PROCEDURE — RXMED WILLOW AMBULATORY MEDICATION CHARGE: Performed by: NURSE PRACTITIONER

## 2025-05-23 RX ORDER — ROSUVASTATIN CALCIUM 10 MG/1
10 TABLET, COATED ORAL EVERY EVENING
Qty: 100 TABLET | Refills: 3 | Status: SHIPPED | OUTPATIENT
Start: 2025-05-23

## 2025-05-23 RX ORDER — VALSARTAN 320 MG/1
320 TABLET ORAL DAILY
Qty: 100 TABLET | Refills: 3 | Status: SHIPPED | OUTPATIENT
Start: 2025-05-23

## 2025-05-27 ENCOUNTER — PHARMACY VISIT (OUTPATIENT)
Dept: PHARMACY | Facility: MEDICAL CENTER | Age: 85
End: 2025-05-27
Payer: COMMERCIAL

## 2025-06-02 ENCOUNTER — OFFICE VISIT (OUTPATIENT)
Dept: CARDIOLOGY | Facility: MEDICAL CENTER | Age: 85
End: 2025-06-02
Attending: INTERNAL MEDICINE
Payer: MEDICARE

## 2025-06-02 VITALS
WEIGHT: 180 LBS | HEIGHT: 64 IN | OXYGEN SATURATION: 96 % | SYSTOLIC BLOOD PRESSURE: 158 MMHG | RESPIRATION RATE: 16 BRPM | HEART RATE: 51 BPM | BODY MASS INDEX: 30.73 KG/M2 | DIASTOLIC BLOOD PRESSURE: 88 MMHG

## 2025-06-02 DIAGNOSIS — I48.0 HYPERCOAGULABLE STATE DUE TO PAROXYSMAL ATRIAL FIBRILLATION (HCC): ICD-10-CM

## 2025-06-02 DIAGNOSIS — I10 HTN (HYPERTENSION), MALIGNANT: ICD-10-CM

## 2025-06-02 DIAGNOSIS — E78.00 PURE HYPERCHOLESTEROLEMIA: ICD-10-CM

## 2025-06-02 DIAGNOSIS — I48.0 PAROXYSMAL ATRIAL FIBRILLATION (HCC): ICD-10-CM

## 2025-06-02 DIAGNOSIS — R00.1 BRADYCARDIA: ICD-10-CM

## 2025-06-02 DIAGNOSIS — D68.69 HYPERCOAGULABLE STATE DUE TO PAROXYSMAL ATRIAL FIBRILLATION (HCC): ICD-10-CM

## 2025-06-02 DIAGNOSIS — G47.33 OSA (OBSTRUCTIVE SLEEP APNEA): ICD-10-CM

## 2025-06-02 DIAGNOSIS — I83.813 VARICOSE VEINS OF BOTH LOWER EXTREMITIES WITH PAIN: ICD-10-CM

## 2025-06-02 LAB — EKG IMPRESSION: NORMAL

## 2025-06-02 PROCEDURE — 3077F SYST BP >= 140 MM HG: CPT | Performed by: INTERNAL MEDICINE

## 2025-06-02 PROCEDURE — 99212 OFFICE O/P EST SF 10 MIN: CPT | Performed by: INTERNAL MEDICINE

## 2025-06-02 PROCEDURE — 93005 ELECTROCARDIOGRAM TRACING: CPT | Mod: TC | Performed by: INTERNAL MEDICINE

## 2025-06-02 PROCEDURE — 3079F DIAST BP 80-89 MM HG: CPT | Performed by: INTERNAL MEDICINE

## 2025-06-02 PROCEDURE — 93010 ELECTROCARDIOGRAM REPORT: CPT | Performed by: INTERNAL MEDICINE

## 2025-06-02 PROCEDURE — 99214 OFFICE O/P EST MOD 30 MIN: CPT | Performed by: INTERNAL MEDICINE

## 2025-06-02 PROCEDURE — G2211 COMPLEX E/M VISIT ADD ON: HCPCS | Performed by: INTERNAL MEDICINE

## 2025-06-02 RX ORDER — HYDRALAZINE HYDROCHLORIDE 50 MG/1
50 TABLET, FILM COATED ORAL
Qty: 180 TABLET | Refills: 4 | Status: SHIPPED | OUTPATIENT
Start: 2025-06-02

## 2025-06-02 ASSESSMENT — ENCOUNTER SYMPTOMS
DIZZINESS: 0
LOSS OF CONSCIOUSNESS: 0
BLOOD IN STOOL: 0
FALLS: 0
FEVER: 0
ORTHOPNEA: 0
COUGH: 0
CLAUDICATION: 0
DOUBLE VISION: 0
BLURRED VISION: 0
WEIGHT LOSS: 0
DEPRESSION: 0
PALPITATIONS: 0
MYALGIAS: 0
EYE DISCHARGE: 0
SENSORY CHANGE: 0
BRUISES/BLEEDS EASILY: 0
NAUSEA: 0
HEADACHES: 0
EYE PAIN: 0
CHILLS: 0
ABDOMINAL PAIN: 0
SPEECH CHANGE: 0
PND: 0
HALLUCINATIONS: 0
SHORTNESS OF BREATH: 0
VOMITING: 0

## 2025-06-02 ASSESSMENT — FIBROSIS 4 INDEX: FIB4 SCORE: 1.94

## 2025-06-02 NOTE — PROGRESS NOTES
Chief Complaint   Patient presents with    Atrial Fibrillation     F/V DX: Paroxysmal atrial fibrillation (HCC)    Hypertension     F/V DX: Primary hypertension    Hyperlipidemia     F/V DX: Mixed hyperlipidemia with apolipoprotein E2 variant       Subjective     Kacie Rubalcava is an 84 y.o. female who presents today for uncontrolled HTN. Been happening more erratic after her colonoscopy.    Kacie Rubalcava does not have any history of heart attack arrhythmias in the past. she never had transthoracic echocardiogram, cardiac catheterization or ablations procedure in the past. At this time, she denies having chest pain shortness of breath presyncopal syncopal episodes. she is able to exercise with walking for one to 2 miles without having problems of chest pain or shortness of breath. Patient is also able to climb up at least 2 flights of stairs without having symptoms.    I have independently interpreted and reviewed echocardiogram's actual images with patient which showed normal left ventricular systolic function. No wall motion abnormality. No evidence of pulmonary hypertension. No significant valvular disease.    I have personally interpreted EKG today with patient, there is no evidence of acute coronary syndrome, no evidence of prior infarct, normal HI and QT interval, no significant conduction disease. Sinus bradycardia.    12/2024 I personally interpreted the venous duplex ultrasound which showed significant reflux disease venous insufficiency seen in bilateral great saphenous vein(s).  I personally reviewed the images with patient in clinic today as well.    Vein procedure was cancelled due to high BPs.    I have independently interpreted and reviewed blood tests results with patient in clinic which shows normal LDL level 42, triglycerides 118,01FR of 37 at baseline, K of 4.2. hgba1c of 6.1. UACR of 12.    I have personally interpreted EKG today with patient, there is no evidence of acute coronary  syndrome, no evidence of prior infarct, normal MS and QT interval, no significant conduction disease. Sinus rhythm.    In the interim, patient has been doing well without having any symptoms. Patient denies having chest pain, dyspnea, palpitation, presyncope, syncope episodes.      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 [facility]    DILATION AND CURETTAGE  3/13/2015    Performed by Alexandria Keenan M.D. at [facility]    BREAST BIOPSY  11/13/08    Performed by ISRAEL HICKS at [facility]    COLON RESECTION  2000    polyps    APPENDECTOMY      OTHER NEUROLOGICAL SURG      TONSILLECTOMY      US-NEEDLE CORE BX-BREAST PANEL       Family History   Problem Relation Age of Onset    Lung Disease Mother     Cancer Mother         lung cancer    Cancer Paternal Aunt         breast     Social History     Socioeconomic History    Marital status: Single     Spouse name: Not on file    Number of children: Not on file    Years of education: Not on file    Highest education level: Not on file   Occupational History    Not on file   Tobacco Use    Smoking status: Never    Smokeless tobacco: Never   Vaping Use    Vaping status: Never Used   Substance and Sexual Activity    Alcohol use: Yes     Comment: Occ    Drug use: No    Sexual activity: Yes     Partners: Male   Other Topics Concern     Not on file   Social History Narrative    Not on file     Social Drivers of Health     Financial Resource Strain: Not on file   Food Insecurity: No Food Insecurity (6/19/2024)    Hunger Vital Sign     Worried About Running Out of Food in the Last Year: Never true     Ran Out of Food in the Last Year: Never true   Transportation Needs: No Transportation Needs (6/19/2024)    PRAPARE - Transportation     Lack of Transportation (Medical): No     Lack of Transportation (Non-Medical): No   Physical Activity: Not on file   Stress: Not on file   Social Connections: Not on file   Intimate Partner Violence: Not At Risk (6/19/2024)    Humiliation, Afraid, Rape, and Kick questionnaire     Fear of Current or Ex-Partner: No     Emotionally Abused: No     Physically Abused: No     Sexually Abused: No   Housing Stability: Low Risk  (6/19/2024)    Housing Stability Vital Sign     Unable to Pay for Housing in the Last Year: No     Number of Places Lived in the Last Year: 1     Unstable Housing in the Last Year: No     Allergies   Allergen Reactions    Food Anaphylaxis and Vomiting     mussels    Sulfa Drugs Anaphylaxis    Augmentin Vomiting    Nifedipine Swelling     Leg edema     Outpatient Encounter Medications as of 6/2/2025   Medication Sig Dispense Refill    hydrALAZINE (APRESOLINE) 50 MG Tab Take 1 Tablet by mouth 2 times a day. 180 Tablet 4    rosuvastatin (CRESTOR) 10 MG Tab Take 1 Tablet by mouth every evening. 100 Tablet 3    valsartan (DIOVAN) 320 MG tablet Take 1 Tablet by mouth every day. 100 Tablet 3    Nebivolol HCl 20 MG Tab Take 10 mg by mouth every day. 90 Tablet 3    apixaban (ELIQUIS) 5mg Tab Take 1 Tablet by mouth 2 times a day. Indications: Thromboembolism secondary to Atrial Fibrillation 200 Tablet 3    fluorometholone (FML) 0.1 % Suspension Instill 1 drop in both eyes 4 times daily for 7 days then twice daily for 7 days (shake bottle) 5 mL 0    diphenoxylate-atropine (LOMOTIL) 2.5-0.025 MG Tab Take 1 Tablet  "by mouth 4 times a day as needed for Diarrhea.      cycloSPORINE (RESTASIS) 0.05 % ophthalmic emulsion Administer 1 Drop into both eyes 2 times a day. 180 Each 3    Empagliflozin (JARDIANCE) 10 MG Tab tablet Take 1 Tablet by mouth every day. 90 Tablet 3    CORAL CALCIUM PO Take 1 Tablet by mouth every evening. 60 Each     [DISCONTINUED] spironolactone (ALDACTONE) 25 MG Tab Take 0.5 Tablets by mouth every day. 90 Tablet 3     No facility-administered encounter medications on file as of 6/2/2025.     Review of Systems   Constitutional:  Negative for chills, fever, malaise/fatigue and weight loss.   HENT:  Negative for ear discharge, ear pain, hearing loss and nosebleeds.    Eyes:  Negative for blurred vision, double vision, pain and discharge.   Respiratory:  Negative for cough and shortness of breath.    Cardiovascular:  Negative for chest pain, palpitations, orthopnea, claudication, leg swelling and PND.   Gastrointestinal:  Negative for abdominal pain, blood in stool, melena, nausea and vomiting.   Genitourinary:  Negative for dysuria and hematuria.   Musculoskeletal:  Negative for falls, joint pain and myalgias.   Skin:  Negative for itching and rash.   Neurological:  Negative for dizziness, sensory change, speech change, loss of consciousness and headaches.   Endo/Heme/Allergies:  Negative for environmental allergies. Does not bruise/bleed easily.   Psychiatric/Behavioral:  Negative for depression, hallucinations and suicidal ideas.               Objective     BP (!) 158/88 (BP Location: Left arm, Patient Position: Sitting, BP Cuff Size: Adult)   Pulse (!) 51   Resp 16   Ht 1.626 m (5' 4\")   Wt 81.6 kg (180 lb)   LMP 01/01/2002   SpO2 96%   BMI 30.90 kg/m²     Physical Exam  Vitals and nursing note reviewed.   Constitutional:       General: She is not in acute distress.     Appearance: She is not diaphoretic.   HENT:      Head: Normocephalic and atraumatic.      Right Ear: External ear normal.      Left " Ear: External ear normal.      Nose: No congestion or rhinorrhea.   Eyes:      General:         Right eye: No discharge.         Left eye: No discharge.   Neck:      Thyroid: No thyromegaly.      Vascular: No JVD.   Cardiovascular:      Rate and Rhythm: Normal rate and regular rhythm.      Pulses: Normal pulses.   Pulmonary:      Effort: No respiratory distress.   Abdominal:      General: There is no distension.      Tenderness: There is no abdominal tenderness.   Musculoskeletal:         General: No swelling or tenderness.      Right lower leg: No edema.      Left lower leg: No edema.      Comments: + varicose veins without evidence of ulcer, discoloration.     Skin:     General: Skin is warm and dry.   Neurological:      Mental Status: She is alert and oriented to person, place, and time.      Cranial Nerves: No cranial nerve deficit.   Psychiatric:         Behavior: Behavior normal.                Assessment & Plan     1. Paroxysmal atrial fibrillation (HCC)  EKG      2. HTN (hypertension), malignant  hydrALAZINE (APRESOLINE) 50 MG Tab      3. Bradycardia        4. Hypercoagulable state due to paroxysmal atrial fibrillation (HCC)        5. Pure hypercholesterolemia        6. Varicose veins of both lower extremities with pain        7. SHIRA (obstructive sleep apnea)              Medical Decision Making: Today's Assessment/Status/Plan:     Blood pressure is still high. Will stop Spironolactone, will start Hydralazine 50 mg BID.    Continue Valsartan 320 mg daily, Bystolic 20 mg daily.    Continue Rosuvastatin 10 mg daily.    Continue anticoagulation with Eliquis 5 mg BID mg BID for treatment of stroke risk reduction. Will closely monitor side effect of systemic bleeding.    She is in sinus rhythm today.    Cannot afford Kerendia.    This visit encounter signifies the visit complexity inherent to evaluation and management associated with medical care services that serve as the continuing focal point for all needed  health care services and/or with medical care services that are part of ongoing care related to this patient's single, serious condition, complex cardiac condition.    Ita Joseph MD.   [facility].

## 2025-06-03 PROCEDURE — RXMED WILLOW AMBULATORY MEDICATION CHARGE: Performed by: INTERNAL MEDICINE

## 2025-06-04 ENCOUNTER — PHARMACY VISIT (OUTPATIENT)
Dept: PHARMACY | Facility: MEDICAL CENTER | Age: 85
End: 2025-06-04
Payer: COMMERCIAL

## 2025-06-09 PROCEDURE — RXMED WILLOW AMBULATORY MEDICATION CHARGE: Performed by: NURSE PRACTITIONER

## 2025-06-17 ENCOUNTER — NON-PROVIDER VISIT (OUTPATIENT)
Dept: INTERNAL MEDICINE | Facility: IMAGING CENTER | Age: 85
End: 2025-06-17
Payer: MEDICARE

## 2025-06-17 VITALS — DIASTOLIC BLOOD PRESSURE: 72 MMHG | SYSTOLIC BLOOD PRESSURE: 132 MMHG

## 2025-06-19 ENCOUNTER — NON-PROVIDER VISIT (OUTPATIENT)
Dept: CARDIOLOGY | Facility: MEDICAL CENTER | Age: 85
End: 2025-06-19
Attending: INTERNAL MEDICINE
Payer: MEDICARE

## 2025-06-19 VITALS
DIASTOLIC BLOOD PRESSURE: 50 MMHG | RESPIRATION RATE: 16 BRPM | SYSTOLIC BLOOD PRESSURE: 112 MMHG | HEART RATE: 66 BPM | OXYGEN SATURATION: 94 %

## 2025-06-19 DIAGNOSIS — I10 ESSENTIAL HYPERTENSION: Primary | Chronic | ICD-10-CM

## 2025-06-19 PROCEDURE — 99211 OFF/OP EST MAY X REQ PHY/QHP: CPT | Performed by: INTERNAL MEDICINE

## 2025-06-19 NOTE — PROGRESS NOTES
Patient was here today for BP check. BP readings located in vital sign section. Informed patient we will forward readings to nurse and they will receive a call with recommendations.  Did patient present with home cuff? No   Is patient reporting any symptoms? No  If Yes, RN to visit exam room

## 2025-06-20 ENCOUNTER — PHARMACY VISIT (OUTPATIENT)
Dept: PHARMACY | Facility: MEDICAL CENTER | Age: 85
End: 2025-06-20
Payer: COMMERCIAL

## 2025-06-20 DIAGNOSIS — E11.65 TYPE 2 DIABETES MELLITUS WITH HYPERGLYCEMIA, WITHOUT LONG-TERM CURRENT USE OF INSULIN (HCC): ICD-10-CM

## 2025-06-20 RX ORDER — EMPAGLIFLOZIN 10 MG/1
10 TABLET, FILM COATED ORAL DAILY
Qty: 90 TABLET | Refills: 3 | Status: CANCELLED | OUTPATIENT
Start: 2025-06-20

## 2025-06-24 DIAGNOSIS — K52.838 OTHER MICROSCOPIC COLITIS: Primary | ICD-10-CM

## 2025-06-24 RX ORDER — DIPHENOXYLATE HYDROCHLORIDE AND ATROPINE SULFATE 2.5; .025 MG/1; MG/1
1 TABLET ORAL 4 TIMES DAILY PRN
Qty: 56 TABLET | Refills: 0 | Status: SHIPPED | OUTPATIENT
Start: 2025-06-24 | End: 2025-07-08

## 2025-06-30 ENCOUNTER — OFFICE VISIT (OUTPATIENT)
Dept: INTERNAL MEDICINE | Facility: IMAGING CENTER | Age: 85
End: 2025-06-30
Payer: MEDICARE

## 2025-06-30 VITALS
DIASTOLIC BLOOD PRESSURE: 64 MMHG | SYSTOLIC BLOOD PRESSURE: 128 MMHG | HEART RATE: 51 BPM | TEMPERATURE: 98.7 F | HEIGHT: 65 IN | RESPIRATION RATE: 17 BRPM | BODY MASS INDEX: 29.99 KG/M2 | WEIGHT: 180 LBS | OXYGEN SATURATION: 97 %

## 2025-06-30 DIAGNOSIS — G89.29 CHRONIC PAIN OF LEFT KNEE: ICD-10-CM

## 2025-06-30 DIAGNOSIS — Z12.31 BREAST CANCER SCREENING BY MAMMOGRAM: ICD-10-CM

## 2025-06-30 DIAGNOSIS — M48.061 LUMBAR FORAMINAL STENOSIS: ICD-10-CM

## 2025-06-30 DIAGNOSIS — E28.39 ESTROGEN DEFICIENCY: ICD-10-CM

## 2025-06-30 DIAGNOSIS — M85.9 DISORDER OF BONE DENSITY AND STRUCTURE, UNSPECIFIED: ICD-10-CM

## 2025-06-30 DIAGNOSIS — M54.50 CHRONIC BILATERAL LOW BACK PAIN WITHOUT SCIATICA: ICD-10-CM

## 2025-06-30 DIAGNOSIS — G89.29 CHRONIC BILATERAL LOW BACK PAIN WITHOUT SCIATICA: ICD-10-CM

## 2025-06-30 DIAGNOSIS — M25.562 CHRONIC PAIN OF LEFT KNEE: ICD-10-CM

## 2025-06-30 DIAGNOSIS — F51.01 PRIMARY INSOMNIA: Primary | ICD-10-CM

## 2025-06-30 DIAGNOSIS — M81.0 OSTEOPOROSIS, POST-MENOPAUSAL: Chronic | ICD-10-CM

## 2025-06-30 PROCEDURE — RXMED WILLOW AMBULATORY MEDICATION CHARGE: Performed by: FAMILY MEDICINE

## 2025-06-30 PROCEDURE — 3078F DIAST BP <80 MM HG: CPT | Performed by: FAMILY MEDICINE

## 2025-06-30 PROCEDURE — 3074F SYST BP LT 130 MM HG: CPT | Performed by: FAMILY MEDICINE

## 2025-06-30 PROCEDURE — 99214 OFFICE O/P EST MOD 30 MIN: CPT | Performed by: FAMILY MEDICINE

## 2025-06-30 RX ORDER — TRAZODONE HYDROCHLORIDE 50 MG/1
50 TABLET ORAL NIGHTLY
Qty: 30 TABLET | Refills: 3 | Status: SHIPPED | OUTPATIENT
Start: 2025-06-30

## 2025-06-30 ASSESSMENT — FIBROSIS 4 INDEX: FIB4 SCORE: 1.94

## 2025-07-01 ENCOUNTER — PHARMACY VISIT (OUTPATIENT)
Dept: PHARMACY | Facility: MEDICAL CENTER | Age: 85
End: 2025-07-01
Payer: COMMERCIAL

## 2025-07-01 PROCEDURE — RXMED WILLOW AMBULATORY MEDICATION CHARGE: Performed by: FAMILY MEDICINE

## 2025-07-01 NOTE — PROGRESS NOTES
"Chief Complaint   Patient presents with    Back Pain     Left knee pain is increasing and low/mid back pain is getting worse. Has been gone to 3 PT visits with no relief. Has been taking tylenol for pain.    Insomnia       HPI:  Patient is a 84 y.o. female established patient who presents today to ***    Patient Active Problem List    Diagnosis Date Noted    Primary insomnia 06/30/2025    Hypercoagulable state due to paroxysmal atrial fibrillation (HCC) 07/09/2024    Anticoagulated 06/27/2024    Paroxysmal atrial fibrillation (HCC) 06/19/2024    Breast cyst, left 11/01/2023    Allergic rhinitis 08/14/2023    Chronic pain of left knee 06/21/2023    OAB (overactive bladder) 02/07/2023    Lumbar foraminal stenosis 02/07/2023    Chronic back pain 02/07/2023    Stage 3a chronic kidney disease 07/10/2022    Type 2 diabetes mellitus with stage 3a chronic kidney disease, without long-term current use of insulin ([facility]) 07/10/2022    BMI 30.0-30.9,adult 07/08/2022    SHIRA (obstructive sleep apnea) 06/21/2021    Epidermal cyst 10/16/2018    Microscopic colitis 09/05/2017    Essential hypertension 12/01/2015    Mixed hyperlipidemia with apolipoprotein E2 variant 04/09/2015    Sinus bradycardia 03/16/2015    Eczema 10/14/2014    Osteoporosis, post-menopausal 10/14/2014    Environmental allergies 04/18/2014    Vitamin D deficiency disease 11/13/2013    Dupuytren's contracture of both hands 05/22/2013    Degenerative joint disease        Past medical, surgical, family, and social history was reviewed and updated in Epic chart by me today.     Medications and allergies reviewed and updated in Epic chart by me today.     ROS:  Pertinent positives listed above in HPI. All other systems have been reviewed and are negative.    PE:   /64 (BP Location: Left arm, Patient Position: Sitting, BP Cuff Size: Adult)   Pulse (!) 51   Temp 37.1 °C (98.7 °F) (Temporal)   Resp 17   Ht 1.638 m (5' 4.5\")   Wt 81.6 kg (180 lb)   LMP " 01/01/2002   SpO2 97%   BMI 30.42 kg/m²   Vital signs reviewed with patient.   ***  Gen: Well developed; well nourished; no acute distress; age appropriate appearance   HEENT: Normocephalic; atraumatic; PEERLA b/l; sclera clear b/l; b/l external auditory canals WNL; b/l TM WNL; nares patent; oropharynx clear; oral mucosa moist; tongue midline; dentition adequate   Neck: No adenopathy; no thyromegaly  CV: Regular rate and rhythm; S1/ S2 present; no murmur, gallop or rub noted  Pulm: No respiratory distress; clear to ascultation b/l; no wheezing or stridor noted b/l  Abd: Adequate bowel sounds noted; soft and nontender; no rebound, rigidity, nor distention; no hepatosplenogmegaly   Extremities: No peripheral edema b/l LE extremities/ no clubbing nor cyanosis noted  Skin: Warm and dry; no rashes noted   Neuro: No focal deficits noted   Psych: AAOx4; mood and affect are appropriate    A/P:  1. Primary insomnia (Primary)  ***  - traZODone (DESYREL) 50 MG Tab; Take 1 Tablet by mouth every evening.  Dispense: 30 Tablet; Refill: 3    2. Disorder of bone density and structure, unspecified  ***  - DS-BONE DENSITY STUDY (DEXA); Future    3. Estrogen deficiency  ***  - DS-BONE DENSITY STUDY (DEXA); Future    4. Osteoporosis, post-menopausal  ***    5. Breast cancer screening by mammogram  ***  - MA-SCREENING MAMMO BILAT W/TOMOSYNTHESIS W/CAD; Future    6. Chronic pain of left knee  ***  - Referral to Orthopedics    7. Chronic bilateral low back pain without sciatica  ***  - Referral to Neurosurgery    8. Lumbar foraminal stenosis  ***  - Referral to Neurosurgery          Patient has seen Dr. Vallejo in the past for evaluation and management, and she would like to follow up with him for evaluation and treatment options. New referral made to Dr. Vallejo.   - Referral to Neurosurgery    8. Lumbar foraminal stenosis  Chronic condition for patient - refer to #7.   - Referral to Neurosurgery    Eye exam UTD per patient report, and Josefina QUISPE will request report from [facility] for our records.

## 2025-07-01 NOTE — Clinical Note
REFERRAL APPROVAL NOTICE         Sent on June 30, 2025                   Kacie Rubalcava  [address] [place] [address]                   Dear Ms. Rubalcava,    After a careful review of the medical information and benefit coverage, [facility] has processed your referral. See below for additional details.    If applicable, you must be actively enrolled with your insurance for coverage of the authorized service. If you have any questions regarding your coverage, please contact your insurance directly.    REFERRAL INFORMATION   Referral #:  [id number]  Referred-To Provider    Referred-By Provider:  Orthopedic Surgery    LESIA Almaguer JESSELL M      [address] [facility]  [address]  [place] [address]  [phone] [address]  [address]  [place] [id number] [phone]    Referral Start Date:  06/30/2025  Referral End Date:   06/30/2026             SCHEDULING  If you do not already have an appointment, please call [phone] to make an appointment.     MORE INFORMATION  If you do not already have a [employer] account, sign up at: [email or web address]  You can access your medical information, make appointments, see lab results, billing information, and more.  If you have questions regarding this referral, please contact  the [facility] Referrals department at:             [phone]. Monday - Friday 8:00AM - 5:00PM.     Sincerely,    [facility]

## 2025-07-01 NOTE — Clinical Note
LV-4/27  NA-7/27    [facility] REFERRAL APPROVAL NOTICE         Sent on June 30, 2025                   Kacie Rubalcava  [address] [place] [address]                   Dear Ms. Rubalcava,    After a careful review of the medical information and benefit coverage, [facility] has processed your referral. See below for additional details.    If applicable, you must be actively enrolled with your insurance for coverage of the authorized service. If you have any questions regarding your coverage, please contact your insurance directly.    REFERRAL INFORMATION   Referral #:  [id number]  Referred-To Provider    Referred-By Provider:  Neurosurgery    LESIA Almaguer MICHAEL H      [address] [facility]  [address]  [employer] [place] [address]  [phone] [address] [facility]  [place] [id number] [phone]    Referral Start Date:  06/30/2025  Referral End Date:   06/30/2026             SCHEDULING  If you do not already have an appointment, please call [phone] to make an appointment.     MORE INFORMATION  If you do not already have a [employer] account, sign up at: [email or web address]  You can access your medical information, make appointments, see lab results, billing information, and more.  If you have questions regarding this referral, please contact  the [facility] Referrals department at:             [phone]. Monday - Friday 8:00AM - 5:00PM.     Sincerely,    [facility]

## 2025-07-02 PROCEDURE — RXMED WILLOW AMBULATORY MEDICATION CHARGE: Performed by: NURSE PRACTITIONER

## 2025-07-03 ENCOUNTER — PHARMACY VISIT (OUTPATIENT)
Dept: PHARMACY | Facility: MEDICAL CENTER | Age: 85
End: 2025-07-03
Payer: COMMERCIAL

## 2025-07-08 PROCEDURE — RXMED WILLOW AMBULATORY MEDICATION CHARGE: Performed by: OPTOMETRIST

## 2025-07-09 ENCOUNTER — PHARMACY VISIT (OUTPATIENT)
Dept: PHARMACY | Facility: MEDICAL CENTER | Age: 85
End: 2025-07-09
Payer: COMMERCIAL

## 2025-07-28 PROCEDURE — RXMED WILLOW AMBULATORY MEDICATION CHARGE: Performed by: FAMILY MEDICINE

## 2025-07-30 PROCEDURE — RXMED WILLOW AMBULATORY MEDICATION CHARGE: Performed by: INTERNAL MEDICINE

## 2025-08-01 ENCOUNTER — PHARMACY VISIT (OUTPATIENT)
Dept: PHARMACY | Facility: MEDICAL CENTER | Age: 85
End: 2025-08-01
Payer: COMMERCIAL

## 2025-08-06 ENCOUNTER — OFFICE VISIT (OUTPATIENT)
Dept: INTERNAL MEDICINE | Facility: IMAGING CENTER | Age: 85
End: 2025-08-06
Payer: MEDICARE

## 2025-08-06 VITALS
WEIGHT: 175 LBS | HEIGHT: 65 IN | DIASTOLIC BLOOD PRESSURE: 82 MMHG | HEART RATE: 50 BPM | SYSTOLIC BLOOD PRESSURE: 148 MMHG | BODY MASS INDEX: 29.16 KG/M2 | OXYGEN SATURATION: 95 % | RESPIRATION RATE: 17 BRPM | TEMPERATURE: 98 F

## 2025-08-06 DIAGNOSIS — G89.29 CHRONIC BILATERAL LOW BACK PAIN WITHOUT SCIATICA: Primary | ICD-10-CM

## 2025-08-06 DIAGNOSIS — M54.50 CHRONIC BILATERAL LOW BACK PAIN WITHOUT SCIATICA: Primary | ICD-10-CM

## 2025-08-06 PROCEDURE — 99213 OFFICE O/P EST LOW 20 MIN: CPT | Performed by: FAMILY MEDICINE

## 2025-08-06 PROCEDURE — 3077F SYST BP >= 140 MM HG: CPT | Performed by: FAMILY MEDICINE

## 2025-08-06 PROCEDURE — 3079F DIAST BP 80-89 MM HG: CPT | Performed by: FAMILY MEDICINE

## 2025-08-06 RX ORDER — PREDNISONE 10 MG/1
10 TABLET ORAL
Qty: 30 TABLET | Refills: 1 | Status: SHIPPED | OUTPATIENT
Start: 2025-08-06

## 2025-08-06 ASSESSMENT — FIBROSIS 4 INDEX: FIB4 SCORE: 1.94

## 2025-08-07 ENCOUNTER — PHARMACY VISIT (OUTPATIENT)
Dept: PHARMACY | Facility: MEDICAL CENTER | Age: 85
End: 2025-08-07
Payer: COMMERCIAL

## 2025-08-07 PROCEDURE — RXMED WILLOW AMBULATORY MEDICATION CHARGE: Performed by: FAMILY MEDICINE
